# Patient Record
Sex: FEMALE | Race: WHITE | NOT HISPANIC OR LATINO | Employment: UNEMPLOYED | URBAN - METROPOLITAN AREA
[De-identification: names, ages, dates, MRNs, and addresses within clinical notes are randomized per-mention and may not be internally consistent; named-entity substitution may affect disease eponyms.]

---

## 2017-02-24 ENCOUNTER — ALLSCRIPTS OFFICE VISIT (OUTPATIENT)
Dept: OTHER | Facility: OTHER | Age: 37
End: 2017-02-24

## 2017-09-20 ENCOUNTER — GENERIC CONVERSION - ENCOUNTER (OUTPATIENT)
Dept: OTHER | Facility: OTHER | Age: 37
End: 2017-09-20

## 2018-01-10 NOTE — MISCELLANEOUS
Message   Recorded as Task   Date: 03/17/2016 04:27 PM, Created By: Neil Barthel   Task Name: Review Document   Assigned To: Kierra Tejeda   Regarding Patient: Angelia Samuel, Status: In Progress   Ramos Inman - 17 Mar 2016 4:27 PM     TASK CREATED  please call Marcelina- [facility] pelon Pared[facility] - 18 Mar 2016 8:11 AM     TASK IN PROGRESS   Keerthi Ogden - 18 Mar 2016 8:12 AM     TASK REPLIED TO: Previously Assigned To [address] with pt  Aware of nl labs  Active Problems    1  Blood type, Rh negative (V49 89) (Z67 91)   2  H/O migraine (V12 49) (Z86 69)   3  Has received influenza vaccination in current influenza season (V49 89) (Z78 9)   4  Multigravida of advanced maternal age in second trimester (65 56) (O09 522)   5  Need for prophylactic vaccination with combined diphtheria-tetanus-pertussis (DTaP)   vaccine (V06 1) (Z23)   6  Prenatal care in second trimester (V22 1) (Z34 92)   7  Short interval between pregnancies affecting pregnancy, antepartum (V23 89) (O09 899)    Current Meds   1  One-A-Day Womens Prenatal 28-0 8 & 440 MG Oral Miscellaneous; take as directed; Therapy: [id number] to Recorded    Allergies    1  No Known Drug Allergies    2  No Known Food Allergies   3   Seasonal    Signatures   Electronically signed by : Rama Cartwright, ; Mar 18 2016  8:12AM EST                       (Author)

## 2018-01-12 VITALS
BODY MASS INDEX: 29.51 KG/M2 | SYSTOLIC BLOOD PRESSURE: 114 MMHG | HEIGHT: 67 IN | DIASTOLIC BLOOD PRESSURE: 72 MMHG | WEIGHT: 188 LBS

## 2018-01-15 NOTE — MISCELLANEOUS
Message   Recorded as Task   Date: 06/02/2016 03:48 PM, Created By: Nessa Leslie   Task Name: Care Coordination   Assigned To: Katlin Patel   Regarding Patient: Kristel Rodriguez, Status: In Progress   Pepe Alves - 02 Jun 2016 3:48 PM     TASK CREATED  Pt called office stating she starting having severe abdominal pain 30-40 mins ago while resting at home with 3 children  Pt states it feels like a bad side stitch that takes her breath away at time, she is using labor breathing techniques  Pt states pain has remained unchanged, position changes do not help  Pt states it does not feel like a contraction, cannot tell if abdomen is tightening, but she does not think so  Pt describes pain of 6-8 out of 10  Pt states she has been feeling baby movements, and denies leaking of fluids  Per RK  (merly Gomez) pt should be seen in triage  Returned call to pt  States  works in [place] and it would be more than 2 hours until they could get to hospital  Pt states she has no family or friends who could drive her - they just moved  Explained to pt that we would advise her to get evaluated this evening  If pt feels she needs immediate evaluation and is concerned for herself or her baby pt should call 911 for emergency assistance  If pt feels well enough to wait for  she should rest and monitor sx until she can get to triage  Pt had labored breathing and was having some trouble speaking through the pain  Pt hung up to call , states she will call back with her plan for receiving care  Nessa Leslie - 10 Ted 2016 3:48 PM     TASK IN PROGRESS   Nessa Leslie - 02 Jun 2016 3:50 PM     TASK EDITED  Pt called office to say she has called 911  Pt was being loaded onto ambulence at time of call - [facility]  Nessa Leslie - 92 Jun 2016 2:21 PM     TASK EDITED  Spoke to pt today  States she [facility] for [facility]   Pt states she feels a lot better today  Pt had no further questions or concerns at this time  Record request sent to [facility]  Active Problems    1  Blood type, Rh negative (V49 89) (Z67 91)   2  Encounter for supervision of other normal pregnancy in second trimester (V22 1)   (Z34 82)   3  H/O migraine (V12 49) (Z86 69)   4  Has received influenza vaccination in current influenza season (V49 89) (Z78 9)   5  Multigravida of advanced maternal age in second trimester (65 56) (O09 522)   6  Need for diphtheria-tetanus-pertussis (Tdap) vaccine (V06 1) (Z23)   7  Need for prophylactic vaccination with combined diphtheria-tetanus-pertussis (DTaP)   vaccine (V06 1) (Z23)   8  Short interval between pregnancies affecting pregnancy, antepartum (V23 89) (O09 899)    Current Meds   1  One-A-Day Womens Prenatal 28-0 8 & 440 MG Oral Miscellaneous; take as directed; Therapy: [id number] to Recorded   2  ZyrTEC Allergy 10 MG Oral Capsule Recorded    Allergies    1  No Known Drug Allergies    2  No Known Food Allergies   3   Seasonal    Signatures   Electronically signed by : Grandville Aschoff, ; Jun 6 2016 11:45AM EST                       (Author)

## 2018-01-17 NOTE — PROGRESS NOTES
MAY 17 2016         RE: Albino Sullivan                              To: [facility]  MR#: [id number]                                  [address]   : [address]                                   [address]   ENC: [id number]:MELANY Huerta, [address]   (Exam #: [id number])                           Fax: [phone]      The LMP of this 39year old,  G4, P3-0-0-3 patient was unknown, her   working MELI is 2016 and the current gestational age is 29 weeks 5   days by [address] [place] [address]  A sonographic examination was performed on MAY   17 2016 using real time equipment  The ultrasound examination was   performed using abdominal technique  The patient has a BMI of 32 8  Her   blood pressure today was 136/77        Earliest ultrasound found in her record: 12/10/15 11w0d 16 MELI      Cardiac motion was observed at 153 bpm       INDICATIONS      fetal anatomical survey   late transfer   rh negative   advanced maternal age      Exam Types      LEVEL II      RESULTS      Fetus # 1 of 1   Vertex presentation   Fetal growth appeared normal   Placenta Location = Posterior   No placenta previa   Placenta Grade = I      MEASUREMENTS (* Included In Average GA)      AC              29 9 cm        34 weeks 0 days* (53%)   BPD              8 0 cm        31 weeks 6 days* (13%)   HC              29 5 cm        32 weeks 1 day * (9%)   Femur            6 2 cm        32 weeks 1 day * (27%)      Humerus          5 6 cm        32 weeks 3 days  (36%)   Radius           4 7 cm        34 weeks 3 days   Ulna             5 3 cm        33 weeks 2 days   Tibia            5 5 cm        32 weeks 1 day   (27%)   Fibula           5 5 cm        31 weeks 5 days   Foot             7 0 cm        34 weeks 1 day      Cerebellum       4 5 cm        35 weeks 2 days   Biorbit          4 6 cm        29 weeks 1 day      HC/AC           0 99   FL/AC           0 21   FL/BPD          0 78   EFW (Ac/Fl/Hc)  2129 grams - 4 lbs 11 oz                 (35%)      THE AVERAGE GESTATIONAL AGE is 32 weeks 4 days +/- 18 days  AMNIOTIC FLUID      Q1: 4 5      Q2: 2 3      Q3: 3 0      Q4: 4 4   LINDA Total = 14 3 cm   Amniotic Fluid: Normal      ANATOMY      Head                                    Normal   Face/Neck                               Normal   Th  Cav  Normal   Heart                                   Normal   Abd  Cav  Normal   Stomach                                 Normal   Right Kidney                            Normal   Left Kidney                             Normal   Bladder                                 Normal   Abd  Wall                               Normal   Spine                                   Normal   Extrems                                 Normal   Genitalia                               Normal   Placenta                                Normal   Umbl  Cord                              Normal   Uterus                                  Normal   PCI                                     Normal      ANATOMY DETAILS      Visualized Appearing Sonographically Normal:   HEAD: (Calvarium, BPD Level, Lateral Ventricles, Choroid Plexus,   Cerebellum, Cisterna Magna);    FACE/NECK: (Neck, Nuchal Fold, Profile,   Orbits, Nose/Lips, Palate, Face);    TH  CAV : (Diaphragm); HEART: (3   Vessel Trachea, Four Chamber View, Proximal Left Outflow, Proximal Right   Outflow, Aortic Arch, Ductal Arch, Short Axis of Greater Vessels, Cardiac   Axis, Interventricular Septum, Interatrial Septum, Cardiac Position);      ABD  CAV , STOMACH, RIGHT KIDNEY, LEFT KIDNEY, BLADDER, ABD  WALL, SPINE:   (Cervical Spine, Thoracic Spine, Lumbar Spine, Sacrum);    EXTREMS: (Lt   Humerus, Rt Humerus, Lt Forearm, Rt Forearm, Lt Hand, Rt Hand, Lt Femur,   Rt Femur, Lt Low Leg, Rt Low Leg, Lt Foot, Rt Foot);    GENITALIA,   PLACENTA, UMBL   CORD, UTERUS, PCI      ANATOMY COMMENTS      No fetal structural abnormality or ultrasound marker for aneuploidy is   identified on the Level II ultrasound study today  The genitalia were   reviewed and found to be female  The patient is aware of the results of   the fetal sex  Fetal growth and amniotic fluid volume appear normal     Active movement of the fetal body & extremities was seen  There is no   suspicion of a subchorionic bleed  The placental cord insertion was   normal  There is no evidence for spontaneous funneling of the cervix seen  ADNEXA      The left ovary appeared normal and measured 2 3 x 2 2 x 1 2 cm with a   volume of 3 2 cc  The right ovary appeared normal and measured 4 4 x 2 0 x   1 4 cm with a volume of 6 4 cc  IMPRESSION      Delgado IUP   32 weeks and 4 days by this ultrasound  (MELI=2016)   33 weeks and 5 days by 1st Tri Sono  (MELI=2016)   Vertex presentation   Fetal growth appeared normal   Normal anatomy survey   Regular fetal heart rate of 153 bpm   Posterior placenta   No placenta previa      GENERAL COMMENT         I had the pleasure of seeing Wellington Vanessa in the  center for   level II ultrasound on May 17  She is a 58-year-old  4 para 3003   with a working due date of   He has 3 healthy boys at home with no   complications with any of their deliveries  She denies any medical or   surgical problems  Her BMI is 32  Her blood pressure today was 136/77   mmHg  Her pregnancy has been uncomplicated to date  She denies any   pregnancy complications such as vaginal bleeding, leakage of fluid, mucoid   type vaginal discharge or contractions  RISK FACTORS: Advanced maternal age      Today's ultrasound was reassuring  A viable fetus was seen in the vertex   presentation  The placenta is posterior in location and there is no   evidence of a placenta previa   Amniotic fluid appeared normal  Fetal   growth appeared very appropriate and correlated well with her due date    There were no obvious anomalies seen today  The anatomic survey was   completed today  Both maternal ovaries were seen and appeared normal    There were no obvious subchorionic hematomas or uterine myomas  The   placental cord insertion were seen and was normal in location  The patient appeared well-nourished, well-developed, and in no apparent   distress  Her uterus is nontender  Her abdomen was gravid and nontender  The anatomic survey was completed today and there were no obvious anatomic   abnormalities  We discussed kick counts in the office today  We discussed the 10 kicks in   2 hour rule  I asked her to call your office if criteria are not met  She   should continue to do her kick counts on a daily basis  Kick counts should   begin at 28 weeks  MISSED ANATOMY: None      NEW FINDINGS ON TODAY'S ULTRASOUND: None, very reassuring ultrasound with   fetus in vertex presentation         IN SUMMARY:  Today's ultrasound is very reassuring  The fetus appears to   be growing well and today's ultrasound correlates very well with her due   date  There were no obvious anomalies seen today  The fetus was in the   vertex presentation  Given the normalcy of today's exam, no further   ultrasounds are scheduled for your patient in the future  Certainly we be   happy to see her in the future if it clinical situation arises but I will   leave that to your discretion  Thank you kindly for this referral                Total face-to-face time with the patient, excluding ultrasound time was 15   minutes with more than 50% of the time devoted to counseling and   coordination of care  Thank you very much for allowing me to participate in the care of your   patient  If you have any questions or concerns about today's visit, please   do not hesitate to call me  Sincerely,         GUILLE Borjas  Maternal Fetal Medicine      [address] [place] [address],[address], [address]        GUILLE Borjas  Maternal-Fetal Medicine   Electronically signed 05/17/16 10:17

## 2018-01-18 NOTE — MISCELLANEOUS
Message   Recorded as Task   Date: 06/02/2016 03:48 PM, Created By: Nessa Leslie   Task Name: Care Coordination   Assigned To: Katlin Patel   Regarding Patient: Kristel Rodriguez, Status: In Progress   Pepe Alves - 02 Jun 2016 3:48 PM     TASK CREATED  Pt called office stating she starting having severe abdominal pain 30-40 mins ago while resting at home with 3 children  Pt states it feels like a bad side stitch that takes her breath away at time, she is using labor breathing techniques  Pt states pain has remained unchanged, position changes do not help  Pt states it does not feel like a contraction, cannot tell if abdomen is tightening, but she does not think so  Pt describes pain of 6-8 out of 10  Pt states she has been feeling baby movements, and denies leaking of fluids  Per RK  (merly Gomez) pt should be seen in triage  Returned call to pt  States  works in [place] and it would be more than 2 hours until they could get to hospital  Pt states she has no family or friends who could drive her - they just moved  Explained to pt that we would advise her to get evaluated this evening  If pt feels she needs immediate evaluation and is concerned for herself or her baby pt should call 911 for emergency assistance  If pt feels well enough to wait for  she should rest and monitor sx until she can get to triage  Pt had labored breathing and was having some trouble speaking through the pain  Pt hung up to call , states she will call back with her plan for receiving care  Nessa Leslie - 65 Jun 2016 3:48 PM     TASK IN PROGRESS   Nessa Leslie - 02 Jun 2016 3:50 PM     TASK EDITED  Pt called office to say she has called 911  Pt was being loaded onto ambulence at time of call - [facility]  Nessa Leslie - 29 Jun 2016 2:21 PM     TASK EDITED  Spoke to pt today  States she [facility]   Pt states she feels a lot better today  Pt had no further questions or concerns at this time  Record request sent to [facility]  Active Problems    1  Blood type, Rh negative (V49 89) (Z67 91)   2  Encounter for supervision of other normal pregnancy in second trimester (V22 1)   (Z34 82)   3  H/O migraine (V12 49) (Z86 69)   4  Has received influenza vaccination in current influenza season (V49 89) (Z78 9)   5  Multigravida of advanced maternal age in second trimester (65 56) (O09 522)   6  Need for diphtheria-tetanus-pertussis (Tdap) vaccine (V06 1) (Z23)   7  Need for prophylactic vaccination with combined diphtheria-tetanus-pertussis (DTaP)   vaccine (V06 1) (Z23)   8  Short interval between pregnancies affecting pregnancy, antepartum (V23 89) (O09 899)    Current Meds   1  One-A-Day Womens Prenatal 28-0 8 & 440 MG Oral Miscellaneous; take as directed; Therapy: [id number] to Recorded   2  ZyrTEC Allergy 10 MG Oral Capsule Recorded    Allergies    1  No Known Drug Allergies    2  No Known Food Allergies   3   Seasonal    Signatures   Electronically signed by : Howard Medrano, ; Ted  3 2016  2:22PM EST                       (Author)

## 2018-01-22 VITALS
OXYGEN SATURATION: 93 % | SYSTOLIC BLOOD PRESSURE: 116 MMHG | TEMPERATURE: 98.8 F | BODY MASS INDEX: 28.09 KG/M2 | DIASTOLIC BLOOD PRESSURE: 74 MMHG | HEIGHT: 67 IN | WEIGHT: 179 LBS | HEART RATE: 89 BPM

## 2018-03-14 ENCOUNTER — OFFICE VISIT (OUTPATIENT)
Dept: FAMILY MEDICINE CLINIC | Facility: CLINIC | Age: 38
End: 2018-03-14
Payer: COMMERCIAL

## 2018-03-14 VITALS
WEIGHT: 173 LBS | DIASTOLIC BLOOD PRESSURE: 84 MMHG | SYSTOLIC BLOOD PRESSURE: 132 MMHG | HEART RATE: 57 BPM | RESPIRATION RATE: 18 BRPM | OXYGEN SATURATION: 98 % | HEIGHT: 67 IN | TEMPERATURE: 97.6 F | BODY MASS INDEX: 27.15 KG/M2

## 2018-03-14 DIAGNOSIS — R05.8 POST-VIRAL COUGH SYNDROME: Primary | ICD-10-CM

## 2018-03-14 PROCEDURE — 99213 OFFICE O/P EST LOW 20 MIN: CPT | Performed by: NURSE PRACTITIONER

## 2018-03-14 PROCEDURE — 3008F BODY MASS INDEX DOCD: CPT | Performed by: NURSE PRACTITIONER

## 2018-03-14 RX ORDER — DEXTROMETHORPHAN HYDROBROMIDE AND PROMETHAZINE HYDROCHLORIDE 15; 6.25 MG/5ML; MG/5ML
5 SYRUP ORAL 4 TIMES DAILY PRN
Qty: 100 ML | Refills: 0 | Status: SHIPPED | OUTPATIENT
Start: 2018-03-14 | End: 2018-06-04 | Stop reason: ALTCHOICE

## 2018-03-14 RX ORDER — METHYLPREDNISOLONE 4 MG/1
TABLET ORAL
Qty: 21 TABLET | Refills: 0 | Status: SHIPPED | OUTPATIENT
Start: 2018-03-14 | End: 2018-06-04 | Stop reason: ALTCHOICE

## 2018-03-14 NOTE — ASSESSMENT & PLAN NOTE
To begin Medrol Dosepak and promethazine DM as needed for cough  Counseled on staying well hydrated and avoiding respiratory irritants  To call office if fever develops, symptoms worsen, or cough becomes productive  Follow-up as needed

## 2018-06-04 ENCOUNTER — ANNUAL EXAM (OUTPATIENT)
Dept: OBGYN CLINIC | Age: 38
End: 2018-06-04
Payer: COMMERCIAL

## 2018-06-04 VITALS
SYSTOLIC BLOOD PRESSURE: 136 MMHG | BODY MASS INDEX: 27.31 KG/M2 | DIASTOLIC BLOOD PRESSURE: 88 MMHG | WEIGHT: 174 LBS | HEIGHT: 67 IN

## 2018-06-04 DIAGNOSIS — Z01.419 ENCOUNTER FOR GYNECOLOGICAL EXAMINATION WITHOUT ABNORMAL FINDING: Primary | ICD-10-CM

## 2018-06-04 PROCEDURE — 99395 PREV VISIT EST AGE 18-39: CPT | Performed by: OBSTETRICS & GYNECOLOGY

## 2018-06-04 NOTE — ASSESSMENT & PLAN NOTE
Pap/HPV current, due 2021  Encourage healthy diet, exercise, Calcium 1200mg per day and at least 800 iu Vitamin D daily

## 2018-06-04 NOTE — PATIENT INSTRUCTIONS
Wellness Visit for Adults   WHAT YOU NEED TO KNOW:   What is a wellness visit? A wellness visit is when you see your healthcare provider to get screened for health problems  You can also get advice on how to stay healthy  Write down your questions so you remember to ask them  Ask your healthcare provider how often you should have a wellness visit  What happens at a wellness visit? Your healthcare provider will ask about your health, and your family history of health problems  This includes high blood pressure, heart disease, and cancer  He or she will ask if you have symptoms that concern you, if you smoke, and about your mood  You may also be asked about your intake of medicines, supplements, food, and alcohol  Any of the following may be done:  · Your weight  will be checked  Your height may also be checked so your body mass index (BMI) can be calculated  Your BMI shows if you are at a healthy weight  · Your blood pressure  and heart rate will be checked  Your temperature may also be checked  · Blood and urine tests  may be done  Blood tests may be done to check your cholesterol levels  Abnormal cholesterol levels increase your risk for heart disease and stroke  You may also need a blood or urine test to check for diabetes if you are at increased risk  Urine tests may be done to look for signs of an infection or kidney disease  · A physical exam  includes checking your heartbeat and lungs with a stethoscope  Your healthcare provider may also check your skin to look for sun damage  · Screening tests  may be recommended  A screening test is done to check for diseases that may not cause symptoms  The screening tests you may need depend on your age, gender, family history, and lifestyle habits  For example, colorectal screening may be recommended if you are 48years old or older  What screening tests do I need if I am a woman? · A Pap smear  is used to screen for cervical cancer   Pap smears are usually done every 3 to 5 years depending on your age  You may need them more often if you have had abnormal Pap smear test results in the past  Ask your healthcare provider how often you should have a Pap smear  · A mammogram  is an x-ray of your breasts to screen for breast cancer  Experts recommend mammograms every 2 years starting at age 48 years  You may need a mammogram at age 52 years or younger if you have an increased risk for breast cancer  Talk to your healthcare provider about when you should start having mammograms and how often you need them  What vaccines might I need? · Get an influenza vaccine  every year  The influenza vaccine protects you from the flu  Several types of viruses cause the flu  The viruses change over time, so new vaccines are made each year  · Get a tetanus-diphtheria (Td) booster vaccine  every 10 years  This vaccine protects you against tetanus and diphtheria  Tetanus is a severe infection that may cause painful muscle spasms and lockjaw  Diphtheria is a severe bacterial infection that causes a thick covering in the back of your mouth and throat  · Get a human papillomavirus (HPV) vaccine  if you are female and aged 23 to 32 or male 23 to 24 and never received it  This vaccine protects you from HPV infection  HPV is the most common infection spread by sexual contact  HPV may also cause vaginal, penile, and anal cancers  · Get a pneumococcal vaccine  if you are aged 72 years or older  The pneumococcal vaccine is an injection given to protect you from pneumococcal disease  Pneumococcal disease is an infection caused by pneumococcal bacteria  The infection may cause pneumonia, meningitis, or an ear infection  · Get a shingles vaccine  if you are aged 61 or older, even if you have had shingles before  The shingles vaccine is an injection to protect you from the varicella-zoster virus  This is the same virus that causes chickenpox   Shingles is a painful rash that develops in people who had chickenpox or have been exposed to the virus  How can I eat healthy? My Plate is a model for planning healthy meals  It shows the types and amounts of foods that should go on your plate  Fruits and vegetables make up about half of your plate, and grains and protein make up the other half  A serving of dairy is included on the side of your plate  The amount of calories and serving sizes you need depends on your age, gender, weight, and height  Examples of healthy foods are listed below:  · Eat a variety of vegetables  such as dark green, red, and orange vegetables  You can also include canned vegetables low in sodium (salt) and frozen vegetables without added butter or sauces  · Eat a variety of fresh fruits , canned fruit in 100% juice, frozen fruit, and dried fruit  · Include whole grains  At least half of the grains you eat should be whole grains  Examples include whole-wheat bread, wheat pasta, brown rice, and whole-grain cereals such as oatmeal     · Eat a variety of protein foods such as seafood (fish and shellfish), lean meat, and poultry without skin (turkey and chicken)  Examples of lean meats include pork leg, shoulder, or tenderloin, and beef round, sirloin, tenderloin, and extra lean ground beef  Other protein foods include eggs and egg substitutes, beans, peas, soy products, nuts, and seeds  · Choose low-fat dairy products such as skim or 1% milk or low-fat yogurt, cheese, and cottage cheese  · Limit unhealthy fats  such as butter, hard margarine, and shortening  How much exercise do I need? Exercise at least 30 minutes per day on most days of the week  Some examples of exercise include walking, biking, dancing, and swimming  You can also fit in more physical activity by taking the stairs instead of the elevator or parking farther away from stores  Include muscle strengthening activities 2 days each week  Regular exercise provides many health benefits  It helps you manage your weight, and decreases your risk for type 2 diabetes, heart disease, stroke, and high blood pressure  Exercise can also help improve your mood  Ask your healthcare provider about the best exercise plan for you  What are some general health and safety guidelines I should follow? · Do not smoke  Nicotine and other chemicals in cigarettes and cigars can cause lung damage  Ask your healthcare provider for information if you currently smoke and need help to quit  E-cigarettes or smokeless tobacco still contain nicotine  Talk to your healthcare provider before you use these products  · Limit alcohol  A drink of alcohol is 12 ounces of beer, 5 ounces of wine, or 1½ ounces of liquor  · Lose weight, if needed  Being overweight increases your risk of certain health conditions  These include heart disease, high blood pressure, type 2 diabetes, and certain types of cancer  · Protect your skin  Do not sunbathe or use tanning beds  Use sunscreen with a SPF 15 or higher  Apply sunscreen at least 15 minutes before you go outside  Reapply sunscreen every 2 hours  Wear protective clothing, hats, and sunglasses when you are outside  · Drive safely  Always wear your seatbelt  Make sure everyone in your car wears a seatbelt  A seatbelt can save your life if you are in an accident  Do not use your cell phone when you are driving  This could distract you and cause an accident  Pull over if you need to make a call or send a text message  · Practice safe sex  Use latex condoms if are sexually active and have more than one partner  Your healthcare provider may recommend screening tests for sexually transmitted infections (STIs)  · Wear helmets, lifejackets, and protective gear  Always wear a helmet when you ride a bike or motorcycle, go skiing, or play sports that could cause a head injury  Wear protective equipment when you play sports   Wear a lifejacket when you are on a boat or doing water sports  CARE AGREEMENT:   You have the right to help plan your care  Learn about your health condition and how it may be treated  Discuss treatment options with your caregivers to decide what care you want to receive  You always have the right to refuse treatment  The above information is an  only  It is not intended as medical advice for individual conditions or treatments  Talk to your doctor, nurse or pharmacist before following any medical regimen to see if it is safe and effective for you  © 2017 [employer] Information is for End User's use only and may not be sold, redistributed or otherwise used for commercial purposes  All illustrations and images included in CareNotes® are the copyrighted property of A [employer]  or Daniel Sweeney

## 2018-06-04 NOTE — PROGRESS NOTES
Assessment/Plan:    No problem-specific Assessment & Plan notes found for this encounter  Diagnoses and all orders for this visit:    Encounter for gynecological examination without abnormal finding          Subjective:      Patient ID: Louis Bruno is a 45 y o  female  Patient here for yearly exam   Age of first period 15yrs old    Lmp: 18  Birth control:  had vasectomy  Last pap: 3/30/16 neg HPV neg (due )  Patient is not a smoker  Patient drinks rarely  Patient rarely exercises  No complaints  Going to [facility] for a week this year  Oldest is 6  Just finished       Gynecologic Exam   The patient's pertinent negatives include no genital itching, genital lesions, genital odor, genital rash, pelvic pain, vaginal bleeding or vaginal discharge  The patient is experiencing no pain  Pertinent negatives include no chills, constipation, diarrhea, fever, nausea, sore throat or vomiting  She is sexually active  Her menstrual history has been regular  The following portions of the patient's history were reviewed and updated as appropriate:   She  has a past medical history of Migraine and Varicella  She   Patient Active Problem List    Diagnosis Date Noted    Encounter for gynecological examination without abnormal finding 2018    Post-viral cough syndrome 2018     She  has a past surgical history that includes Anterior cruciate ligament repair and Vaginal delivery  Her family history includes Arthritis in her maternal grandmother; Asthma in her maternal grandmother; Cancer in her father and paternal grandmother; Depression in her paternal grandmother; Diabetes in her maternal grandmother; Early death in her maternal aunt;  Heart block in her father and paternal grandfather; Heart disease in her maternal aunt, maternal grandfather, maternal grandmother, and paternal grandfather; Hypertension in her father, maternal grandfather, maternal grandmother, other, paternal grandfather, and paternal grandmother  She  reports that she has never smoked  She has never used smokeless tobacco  She reports that she does not drink alcohol or use drugs  Current Outpatient Prescriptions   Medication Sig Dispense Refill    acetaminophen (TYLENOL) 325 mg tablet Take 2 tablets (650 mg total) by mouth every 4 (four) hours as needed for headaches  30 tablet 0     No current facility-administered medications for this visit  Current Outpatient Prescriptions on File Prior to Visit   Medication Sig    acetaminophen (TYLENOL) 325 mg tablet Take 2 tablets (650 mg total) by mouth every 4 (four) hours as needed for headaches   [DISCONTINUED] Methylprednisolone 4 MG TBPK Use as directed on package    [DISCONTINUED] promethazine-dextromethorphan (PHENERGAN-DM) 6 25-15 mg/5 mL oral syrup Take 5 mL by mouth 4 (four) times a day as needed for cough     No current facility-administered medications on file prior to visit  She is allergic to pollen extract       Review of Systems   Constitutional: Negative for activity change, appetite change, chills, fatigue and fever  HENT: Negative for rhinorrhea, sneezing and sore throat  Eyes: Negative for visual disturbance  Respiratory: Negative for cough, shortness of breath and wheezing  Cardiovascular: Negative for chest pain, palpitations and leg swelling  Gastrointestinal: Negative for abdominal distention, constipation, diarrhea, nausea and vomiting  Genitourinary: Negative for difficulty urinating, pelvic pain and vaginal discharge  Neurological: Negative for syncope and light-headedness  Objective:      /88 (BP Location: Left arm, Patient Position: Sitting, Cuff Size: Standard)   Ht 5' 6 5" (1 689 m)   Wt 78 9 kg (174 lb)   LMP 05/22/2018 (Exact Date)   BMI 27 66 kg/m²          Physical Exam   Constitutional: She is oriented to person, place, and time     Genitourinary: Vagina normal and uterus normal  No breast swelling, tenderness, discharge or bleeding  There is no rash, tenderness, lesion or injury on the right labia  There is no rash, tenderness, lesion or injury on the left labia  Uterus is not deviated, not enlarged, not fixed and not tender  Cervix exhibits no motion tenderness, no discharge and no friability  Right adnexum displays no mass, no tenderness and no fullness  Left adnexum displays no mass, no tenderness and no fullness  No tenderness or bleeding in the vagina  No vaginal discharge found  Neurological: She is alert and oriented to person, place, and time

## 2019-01-28 ENCOUNTER — OFFICE VISIT (OUTPATIENT)
Dept: FAMILY MEDICINE CLINIC | Facility: CLINIC | Age: 39
End: 2019-01-28
Payer: COMMERCIAL

## 2019-01-28 VITALS
RESPIRATION RATE: 16 BRPM | SYSTOLIC BLOOD PRESSURE: 130 MMHG | DIASTOLIC BLOOD PRESSURE: 80 MMHG | TEMPERATURE: 98 F | OXYGEN SATURATION: 98 % | BODY MASS INDEX: 28.41 KG/M2 | WEIGHT: 181 LBS | HEART RATE: 68 BPM | HEIGHT: 67 IN

## 2019-01-28 DIAGNOSIS — M77.8 TRICEPS TENDINITIS: Primary | ICD-10-CM

## 2019-01-28 PROCEDURE — 3008F BODY MASS INDEX DOCD: CPT | Performed by: FAMILY MEDICINE

## 2019-01-28 PROCEDURE — 99213 OFFICE O/P EST LOW 20 MIN: CPT | Performed by: FAMILY MEDICINE

## 2019-01-28 RX ORDER — PREDNISONE 10 MG/1
TABLET ORAL
Qty: 30 TABLET | Refills: 0 | Status: SHIPPED | OUTPATIENT
Start: 2019-01-28 | End: 2019-06-10 | Stop reason: ALTCHOICE

## 2019-01-28 NOTE — PROGRESS NOTES
Assessment/Plan:           Problem List Items Addressed This Visit     Triceps tendinitis - Primary     Intermittent ice         Relevant Medications    predniSONE 10 mg tablet            Subjective:      Patient ID: Mercedes Espinosa is a 44 y o  female  Patient comes in with right arm pain  She fell downstairs 2 months ago injuring her right arm  Most area of soreness have resolved  She still has tenderness right posterior arm  The following portions of the patient's history were reviewed and updated as appropriate: allergies, current medications, past family history, past medical history, past social history, past surgical history and problem list     Review of Systems   Constitutional: Negative  HENT: Negative  Respiratory: Negative  Cardiovascular: Negative  Objective:      /80   Pulse 68   Temp 98 °F (36 7 °C)   Resp 16   Ht 5' 7" (1 702 m)   Wt 82 1 kg (181 lb)   SpO2 98%   BMI 28 35 kg/m²          Physical Exam   Musculoskeletal:   Full range of motion right elbow  Tenderness over the triceps tendon

## 2019-06-04 ENCOUNTER — HOSPITAL ENCOUNTER (EMERGENCY)
Facility: HOSPITAL | Age: 39
Discharge: HOME | End: 2019-06-04
Attending: EMERGENCY MEDICINE | Admitting: EMERGENCY MEDICINE
Payer: COMMERCIAL

## 2019-06-04 ENCOUNTER — APPOINTMENT (EMERGENCY)
Dept: RADIOLOGY | Facility: HOSPITAL | Age: 39
End: 2019-06-04
Payer: COMMERCIAL

## 2019-06-04 VITALS
SYSTOLIC BLOOD PRESSURE: 156 MMHG | OXYGEN SATURATION: 98 % | DIASTOLIC BLOOD PRESSURE: 76 MMHG | BODY MASS INDEX: 29.03 KG/M2 | HEIGHT: 67 IN | HEART RATE: 69 BPM | WEIGHT: 185 LBS | RESPIRATION RATE: 18 BRPM | TEMPERATURE: 98.6 F

## 2019-06-04 DIAGNOSIS — S93.601A SPRAIN OF RIGHT FOOT, INITIAL ENCOUNTER: Primary | ICD-10-CM

## 2019-06-04 PROCEDURE — 99283 EMERGENCY DEPT VISIT LOW MDM: CPT

## 2019-06-04 PROCEDURE — 99283 EMERGENCY DEPT VISIT LOW MDM: CPT | Performed by: NURSE PRACTITIONER

## 2019-06-04 PROCEDURE — 73630 X-RAY EXAM OF FOOT: CPT

## 2019-06-04 RX ORDER — IBUPROFEN 200 MG
400 TABLET ORAL EVERY 6 HOURS PRN
COMMUNITY
End: 2020-06-03

## 2019-06-04 RX ORDER — NAPROXEN 500 MG/1
500 TABLET ORAL 2 TIMES DAILY WITH MEALS
Qty: 10 TABLET | Refills: 0 | Status: SHIPPED | OUTPATIENT
Start: 2019-06-04 | End: 2020-06-03

## 2019-06-04 RX ORDER — HYDROCODONE BITARTRATE AND ACETAMINOPHEN 5; 325 MG/1; MG/1
1 TABLET ORAL EVERY 6 HOURS PRN
Qty: 12 TABLET | Refills: 0 | Status: SHIPPED | OUTPATIENT
Start: 2019-06-04 | End: 2019-06-10 | Stop reason: ALTCHOICE

## 2019-06-10 ENCOUNTER — ANNUAL EXAM (OUTPATIENT)
Dept: OBGYN CLINIC | Facility: CLINIC | Age: 39
End: 2019-06-10
Payer: COMMERCIAL

## 2019-06-10 VITALS — HEIGHT: 67 IN | WEIGHT: 191.25 LBS | BODY MASS INDEX: 30.02 KG/M2

## 2019-06-10 DIAGNOSIS — Z01.419 ENCOUNTER FOR GYNECOLOGICAL EXAMINATION WITHOUT ABNORMAL FINDING: Primary | ICD-10-CM

## 2019-06-10 PROCEDURE — 99395 PREV VISIT EST AGE 18-39: CPT | Performed by: OBSTETRICS & GYNECOLOGY

## 2019-08-25 NOTE — PROGRESS NOTES
Assessment/Plan:    Post-viral cough syndrome    To begin Medrol Dosepak and promethazine DM as needed for cough  Counseled on staying well hydrated and avoiding respiratory irritants  To call office if fever develops, symptoms worsen, or cough becomes productive  Follow-up as needed  Diagnoses and all orders for this visit:    Post-viral cough syndrome  -     Methylprednisolone 4 MG TBPK; Use as directed on package  -     promethazine-dextromethorphan (PHENERGAN-DM) 6 25-15 mg/5 mL oral syrup; Take 5 mL by mouth 4 (four) times a day as needed for cough    Other orders  -     Discontinue: Cetirizine HCl (ZYRTEC ALLERGY) 10 MG CAPS; Take by mouth          Subjective:      Patient ID: Christin Choi is a 45 y o  female  Cough   This is a new problem  The current episode started 1 to 4 weeks ago ( started 2 weeks ago after having a cold  )  The problem has been unchanged  The problem occurs hourly  The cough is non-productive  Associated symptoms include postnasal drip and a sore throat  Pertinent negatives include no chest pain, chills, ear congestion, fever, myalgias, nasal congestion, rhinorrhea, shortness of breath, weight loss or wheezing  The symptoms are aggravated by lying down  Treatments tried:   NyQuil  The treatment provided moderate relief  Her past medical history is significant for bronchitis  There is no history of asthma or pneumonia  The following portions of the patient's history were reviewed and updated as appropriate: She  has a past medical history of Migraine and Varicella  She   Patient Active Problem List    Diagnosis Date Noted    Post-viral cough syndrome 03/14/2018     She  has a past surgical history that includes Anterior cruciate ligament repair and Vaginal delivery    Her family history includes Arthritis in her maternal grandmother; Asthma in her maternal grandmother; Cancer in her father and paternal grandmother; Depression in her paternal grandmother; Diabetes pt co heavy/clotty bleeding from vagina a/w abd pain starting today, sts took day after pill Tuesday,  thought she was pregnant. denies fever, n/v/d, in her maternal grandmother; Early death in her maternal aunt; Heart block in her father and paternal grandfather; Heart disease in her maternal aunt, maternal grandfather, maternal grandmother, and paternal grandfather; Hypertension in her father, maternal grandfather, maternal grandmother, other, paternal grandfather, and paternal grandmother  She  reports that she has never smoked  She has never used smokeless tobacco  She reports that she does not drink alcohol or use drugs  Current Outpatient Prescriptions   Medication Sig Dispense Refill    acetaminophen (TYLENOL) 325 mg tablet Take 2 tablets (650 mg total) by mouth every 4 (four) hours as needed for headaches  30 tablet 0    Methylprednisolone 4 MG TBPK Use as directed on package 21 tablet 0    promethazine-dextromethorphan (PHENERGAN-DM) 6 25-15 mg/5 mL oral syrup Take 5 mL by mouth 4 (four) times a day as needed for cough 100 mL 0     No current facility-administered medications for this visit  Current Outpatient Prescriptions on File Prior to Visit   Medication Sig    acetaminophen (TYLENOL) 325 mg tablet Take 2 tablets (650 mg total) by mouth every 4 (four) hours as needed for headaches   [DISCONTINUED] cetirizine (ZyrTEC) 10 mg tablet Take 10 mg by mouth daily   [DISCONTINUED] Prenatal MV-Min-Fe Fum-FA-DHA (PRENATAL 1 PO) Take by mouth daily   [DISCONTINUED] ranitidine (ZANTAC) 75 MG tablet Take 75 mg by mouth daily at bedtime  No current facility-administered medications on file prior to visit  She is allergic to pollen extract       Review of Systems   Constitutional: Negative for chills, fever and weight loss  HENT: Positive for postnasal drip and sore throat  Negative for rhinorrhea  Respiratory: Positive for cough  Negative for chest tightness, shortness of breath and wheezing  Cardiovascular: Negative for chest pain  Musculoskeletal: Negative for myalgias  Skin: Negative  Neurological: Negative  Psychiatric/Behavioral: Negative  /84   Pulse 57   Temp 97 6 °F (36 4 °C)   Resp 18   Ht 5' 7" (1 702 m)   Wt 78 5 kg (173 lb)   LMP 03/07/2018 (Within Days)   SpO2 98%   BMI 27 10 kg/m²     Objective:     Physical Exam   Constitutional: She is oriented to person, place, and time  She appears well-developed and well-nourished  HENT:   Head: Normocephalic and atraumatic  Right Ear: External ear normal    Left Ear: External ear normal    Nose: Nose normal    Mouth/Throat: Oropharynx is clear and moist  No oropharyngeal exudate  Tms Dull B/L   Neck: Neck supple  Cardiovascular: Regular rhythm and normal heart sounds  No murmur heard  Pulmonary/Chest: Effort normal and breath sounds normal  No respiratory distress  She has no wheezes  She has no rales  She exhibits no tenderness  Dry cough on exam    Lymphadenopathy:     She has no cervical adenopathy  Neurological: She is alert and oriented to person, place, and time  Psychiatric: She has a normal mood and affect   Her behavior is normal  Judgment and thought content normal

## 2020-01-30 ENCOUNTER — OFFICE VISIT (OUTPATIENT)
Dept: FAMILY MEDICINE CLINIC | Facility: CLINIC | Age: 40
End: 2020-01-30
Payer: COMMERCIAL

## 2020-01-30 VITALS
SYSTOLIC BLOOD PRESSURE: 128 MMHG | WEIGHT: 189 LBS | BODY MASS INDEX: 29.6 KG/M2 | OXYGEN SATURATION: 98 % | DIASTOLIC BLOOD PRESSURE: 90 MMHG | TEMPERATURE: 98.1 F | HEART RATE: 68 BPM

## 2020-01-30 DIAGNOSIS — G56.01 CARPAL TUNNEL SYNDROME OF RIGHT WRIST: Primary | ICD-10-CM

## 2020-01-30 PROCEDURE — 99213 OFFICE O/P EST LOW 20 MIN: CPT | Performed by: PHYSICIAN ASSISTANT

## 2020-01-30 NOTE — PROGRESS NOTES
Assessment/Plan:  BMI Counseling: Body mass index is 29 6 kg/m²  The BMI is above normal  Nutrition recommendations include decreasing portion sizes, encouraging healthy choices of fruits and vegetables, consuming healthier snacks, limiting drinks that contain sugar, increasing intake of lean protein and reducing intake of saturated and trans fat  Exercise recommendations include exercising 3-5 times per week  No pharmacotherapy was ordered  Diagnoses and all orders for this visit:    Carpal tunnel syndrome of right wrist  -     EMG 1 Limb; Future        Patient to use wrist cock-up splint at night  Subjective:      Patient ID: Jo Theodore is a 36 y o  female  Patient has a 2 week history of pins and needles feeling in her right 2nd digit and 3rd digit  Prior to that she would notice she might wake up in the morning with some tingling and it would resolve  Now she states it stays and at times worsens through the day  She went to urgent care recently and they put her on prednisone  She has not improved on prednisone at this time  She denies any weakness of her hand or her arm  The following portions of the patient's history were reviewed and updated as appropriate:   She has a past medical history of Migraine and Varicella  ,  does not have any pertinent problems on file  ,   has a past surgical history that includes Anterior cruciate ligament repair and Vaginal delivery  ,  family history includes Arthritis in her maternal grandmother; Asthma in her maternal grandmother; Cancer in her father and paternal grandmother; Depression in her paternal grandmother; Diabetes in her maternal grandmother; Early death in her maternal aunt;  Heart block in her father and paternal grandfather; Heart disease in her maternal aunt, maternal grandfather, maternal grandmother, and paternal grandfather; Hypertension in her father, maternal grandfather, maternal grandmother, other, paternal grandfather, and paternal grandmother  ,   reports that she has never smoked  She has never used smokeless tobacco  She reports that she drinks alcohol  She reports that she does not use drugs  ,  is allergic to pollen extract     Current Outpatient Medications   Medication Sig Dispense Refill    acetaminophen (TYLENOL) 325 mg tablet Take 2 tablets (650 mg total) by mouth every 4 (four) hours as needed for headaches  30 tablet 0    Cetirizine HCl (ZYRTEC ALLERGY) 10 MG CAPS Take by mouth      ibuprofen (MOTRIN) 200 mg tablet Take 400 mg by mouth every 6 (six) hours as needed for mild pain      naproxen (NAPROSYN) 500 mg tablet Take 1 tablet (500 mg total) by mouth 2 (two) times a day with meals for 5 days 10 tablet 0     No current facility-administered medications for this visit  Review of Systems   Constitutional: Negative for activity change, chills and fever  Respiratory: Negative for chest tightness  Endocrine: Negative for cold intolerance and heat intolerance  Musculoskeletal: Negative for arthralgias and joint swelling  Skin: Negative for rash  Neurological: Positive for numbness  Negative for dizziness and light-headedness  Objective:  Vitals:    01/30/20 1304   BP: 128/90   Pulse: 68   Temp: 98 1 °F (36 7 °C)   SpO2: 98%   Weight: 85 7 kg (189 lb)     Body mass index is 29 6 kg/m²  Physical Exam   Constitutional: She is oriented to person, place, and time  She appears well-developed and well-nourished  HENT:   Head: Normocephalic  Cardiovascular: Normal rate and regular rhythm  Pulmonary/Chest: Effort normal    Musculoskeletal: Normal range of motion  She exhibits no edema or tenderness  Neurological: She is alert and oriented to person, place, and time  She has normal strength  No sensory deficit  Patient can feel touch on the tip of her fingers that are affected  Skin: Skin is warm and dry  Psychiatric: She has a normal mood and affect   Her behavior is normal    Nursing note and vitals reviewed

## 2020-02-10 ENCOUNTER — TELEPHONE (OUTPATIENT)
Dept: FAMILY MEDICINE CLINIC | Facility: CLINIC | Age: 40
End: 2020-02-10

## 2020-02-10 DIAGNOSIS — G56.01 CARPAL TUNNEL SYNDROME OF RIGHT WRIST: Primary | ICD-10-CM

## 2020-02-10 RX ORDER — PREDNISONE 10 MG/1
TABLET ORAL
Qty: 15 TABLET | Refills: 0 | Status: SHIPPED | OUTPATIENT
Start: 2020-02-10 | End: 2020-06-03

## 2020-02-10 NOTE — TELEPHONE ENCOUNTER
See if patient is taking any type of anti inflammatory either ibuprofen or Naprosyn  If not she needs to take those on a regular basis  I can also send in prednisone if she would like to take that

## 2020-02-10 NOTE — TELEPHONE ENCOUNTER
Do not take the ibuprofen and prednisone at the same time  Make sure patient has something to eat with both  Prednisone should be taken in the beginning of the day not late

## 2020-02-10 NOTE — TELEPHONE ENCOUNTER
Patient states her pain in the wrist is worse  Patient states its swollen and still achy  Please advise

## 2020-02-26 ENCOUNTER — TELEPHONE (OUTPATIENT)
Dept: FAMILY MEDICINE CLINIC | Facility: CLINIC | Age: 40
End: 2020-02-26

## 2020-02-26 ENCOUNTER — PROCEDURE VISIT (OUTPATIENT)
Dept: NEUROLOGY | Facility: CLINIC | Age: 40
End: 2020-02-26
Payer: COMMERCIAL

## 2020-02-26 DIAGNOSIS — G56.01 CARPAL TUNNEL SYNDROME OF RIGHT WRIST: ICD-10-CM

## 2020-02-26 DIAGNOSIS — G56.01 CARPAL TUNNEL SYNDROME OF RIGHT WRIST: Primary | ICD-10-CM

## 2020-02-26 DIAGNOSIS — M79.601 RIGHT ARM PAIN: ICD-10-CM

## 2020-02-26 PROCEDURE — 95886 MUSC TEST DONE W/N TEST COMP: CPT | Performed by: PHYSICAL MEDICINE & REHABILITATION

## 2020-02-26 PROCEDURE — 95909 NRV CNDJ TST 5-6 STUDIES: CPT | Performed by: PHYSICAL MEDICINE & REHABILITATION

## 2020-02-26 NOTE — TELEPHONE ENCOUNTER
----- Message from Alfonza Lesch, PA-C sent at 2/26/2020 10:37 AM EST -----  EMG study was normal   There is no problems as far as any of the nerves in her right arm are concerned

## 2020-02-26 NOTE — TELEPHONE ENCOUNTER
Patient is aware of results, she says she is still having problems with her arm and would like to know what's the next step?

## 2020-02-26 NOTE — PROGRESS NOTES
EMG 1 Limb     Date/Time 2/26/2020 10:16 AM     Performed by  Geoffrey Gardner MD     Authorized by Paxton Haider PA-C      Universal Protocol Consent: Verbal consent obtained  Risks and benefits: risks, benefits and alternatives were discussed  Consent given by: patient  Patient understanding: patient states understanding of the procedure being performed  Patient consent: the patient's understanding of the procedure matches consent given  Patient identity confirmed: verbally with patient               EMG RIGHT UPPER EXTREMITY    20-year-old female presents with tingling and numbness in the right 2nd and 3rd digit for the last few weeks, that gets worse throughout the day  She denies any weakness of her hand or arm  Patient is being evaluated for focal neuropathy  On physical examination, motor strength is 5/5 throughout  Sensations are intact to light touch and pinprick in the median, radial and ulnar nerve distribution  Motor and sensory conduction studies were performed on the right median and ulnar nerves  The distal motor latencies were normal  The motor action potential amplitudes were normal  Motor conduction velocities were normal including conduction velocity of the ulnar nerve across the elbow  Median and ulnar F waves were normal     Median and ulnar distal sensory latencies were normal including conduction of the median nerve across the palm with normal sensory action potential amplitudes  Concentric needle EMG was performed on various distal and proximal muscles of the right upper extremity including APB, FDI, pronator teres, deltoid, biceps, triceps and low cervical paraspinal region  There was no evidence of active denervation in any of the muscles tested  The compound motor unit action potentials were of normal configuration with interference patterns being full or full for effort  IMPRESSION: Normal study  GUILLE Bowling

## 2020-02-29 ENCOUNTER — AMB VIDEO VISIT (OUTPATIENT)
Dept: OTHER | Facility: HOSPITAL | Age: 40
End: 2020-02-29

## 2020-02-29 PROCEDURE — EVISIT: Performed by: FAMILY MEDICINE

## 2020-02-29 NOTE — CARE ANYWHERE EVISITS
Visit Summary for KEILA FERNÁNDEZ - Gender: Female - Date of Birth: [id number]  Date: [id number] - Duration: 1 minutes  Patient: Juana Garcia   [facility]  Provider: Jacinta Chin    Patient Contact Information  Address  [address]; [address] [place] [address] Drive  [phone]    Visit Topics  Pink eye [Added By: Self - 2020-02-29]    Conversation Transcripts  [0A][0A] [Notification] Kiesha Ang, [employer] Staff, will help you prepare for your visit  She is assisting Jacinta Chin, Family Physician [0A][Jda Hidalgo] Mira, and thank you for connecting  While you are waiting for the doctor, are there any   questions I can answer for you about our service? Please contact customer service if you have questions about billing, insurance, or technical issues  Visits work best with a stable WiFi connection, so please make sure you are connected before we   begin [0A][Notification] Kiesha Ang has left the room  [0A][KEILA FERNÁNDEZ] No thank you[0A][Notification] You are connected with Jacinta Chin, Family Physician [0A][Notification] Rola Goddard is located in [place]  [0A][Notification]   Rola Goddard has shared health history  Jad Bhatia  [0A]    Diagnosis  Other mucopurulent conjunctivitis, left eye    Procedures  Value: [id number] Code: CPT-4 UNLISTED E&M SERVICE    Medications Prescribed    Ocuflox  Dose : 1 drop  Route : ophthalmic  Frequency : 4 times a day  Until directed to stop  Patient Instructions : Instill into left eye for 5 days  Refills : 0  Instructions to the Pharmacist : Substitutions allowed      Provider Notes  [0A][0A] Please be sure to share the details of this visit with your pcp[0A]Mode of Communication: video[0A]History: The patient is a 35 y/o female who states that two of her children were diagnosed with pink eye and she has been administering their   eyedrops  She now has irritation of left eye with redness   Started last night with mild redness and this morning had yellow discharge, more redness and stuck together eyelids  No URI  Vision is ok  contact lenses: no  No photophobia or trauma  [0A]Past   medical history: healthy female [0A]PSH: none[0A]Medications: none[0A]Allergies: nkda[0A]PE: [0A]Gen:well appearing [0A]Eyes: left eye with bulbar and palpebral conjunctival injection; yellow discharge noted on lashes and eyelids   Extraocular movements   are intact, no proptosis  [0A]Nose: slightly congested [0A]Sinuses:non tender [0A]Assessment: Conjunctivitis [0A]Plan:[0A]1  Medications: ocuflox eye drops[0A]MDM: Pt with acute conjunctivitis, likely bacterial based on discharge type, acuity  Discussed   possible causes of red eye including allergic, viral, and bacterial, and foreign body  Given purulent discharge, lack of prior episodes of allergic conjunctivitis, no known trauma, and lack of severe photophobia, the patient and I made an informed shared   decision to assume bacterial conjunctivitis and start antibiotic drops  Begin eye drops as directed for 5-7 days, discussed how to administer drops and for how long, discussed hand washing and proper hygiene to reduce risk of spread to others  [0A]2  Home care:[0A]a  Wash hands after touching eye  May use saline eye drops as needed for comfort[0A]3  Return to work/school: May return after > 24 hours of treatment and symptoms improving[0A]4  Referral or follow up:[0A]a  As needed for worsening or if   no improvement in 3 days[0A]1  If you received a prescription at this visit and you have a question or problem please call [phone] for prescription assistance[0A]2  Please print a copy of this note and send it to your regular doctor, or take it to   your next visit so it may be included in your medical record[0A]3  Please see your primary care provider on an annual basis or more frequently if directed[0A]The patient voiced understanding and agreement with plan [0A][0A]    Electronically signed by:  Noa Sotelo(NPI [id number])

## 2020-06-03 ENCOUNTER — OFFICE VISIT (OUTPATIENT)
Dept: FAMILY MEDICINE CLINIC | Facility: CLINIC | Age: 40
End: 2020-06-03
Payer: COMMERCIAL

## 2020-06-03 VITALS
TEMPERATURE: 97.3 F | WEIGHT: 192.4 LBS | DIASTOLIC BLOOD PRESSURE: 86 MMHG | SYSTOLIC BLOOD PRESSURE: 132 MMHG | BODY MASS INDEX: 30.2 KG/M2 | HEART RATE: 80 BPM | OXYGEN SATURATION: 99 % | HEIGHT: 67 IN

## 2020-06-03 DIAGNOSIS — Z13.220 SCREENING FOR HYPERLIPIDEMIA: ICD-10-CM

## 2020-06-03 DIAGNOSIS — Z13.0 SCREENING FOR DEFICIENCY ANEMIA: ICD-10-CM

## 2020-06-03 DIAGNOSIS — D17.20 LIPOMA OF UPPER EXTREMITY, UNSPECIFIED LATERALITY: ICD-10-CM

## 2020-06-03 DIAGNOSIS — Z12.39 SCREENING FOR BREAST CANCER: Primary | ICD-10-CM

## 2020-06-03 DIAGNOSIS — Z13.1 SCREENING FOR DIABETES MELLITUS (DM): ICD-10-CM

## 2020-06-03 DIAGNOSIS — Z00.00 HEALTH MAINTENANCE EXAMINATION: ICD-10-CM

## 2020-06-03 PROCEDURE — 99396 PREV VISIT EST AGE 40-64: CPT | Performed by: FAMILY MEDICINE

## 2020-06-03 RX ORDER — MULTIVIT-MIN/IRON FUM/FOLIC AC 7.5 MG-4
1 TABLET ORAL DAILY
COMMUNITY
End: 2022-01-18 | Stop reason: ALTCHOICE

## 2020-09-01 ENCOUNTER — APPOINTMENT (OUTPATIENT)
Dept: LAB | Facility: CLINIC | Age: 40
End: 2020-09-01
Payer: COMMERCIAL

## 2020-09-01 ENCOUNTER — HOSPITAL ENCOUNTER (OUTPATIENT)
Dept: MAMMOGRAPHY | Facility: CLINIC | Age: 40
Discharge: HOME | End: 2020-09-01
Payer: COMMERCIAL

## 2020-09-01 VITALS — HEIGHT: 67 IN | BODY MASS INDEX: 30.13 KG/M2 | WEIGHT: 192 LBS

## 2020-09-01 DIAGNOSIS — Z13.220 SCREENING FOR HYPERLIPIDEMIA: ICD-10-CM

## 2020-09-01 DIAGNOSIS — Z13.1 SCREENING FOR DIABETES MELLITUS (DM): ICD-10-CM

## 2020-09-01 DIAGNOSIS — Z13.0 SCREENING FOR DEFICIENCY ANEMIA: ICD-10-CM

## 2020-09-01 DIAGNOSIS — Z12.39 SCREENING FOR BREAST CANCER: ICD-10-CM

## 2020-09-01 LAB
ALBUMIN SERPL BCP-MCNC: 4.1 G/DL (ref 3.5–5)
ALP SERPL-CCNC: 68 U/L (ref 46–116)
ALT SERPL W P-5'-P-CCNC: 39 U/L (ref 12–78)
ANION GAP SERPL CALCULATED.3IONS-SCNC: 4 MMOL/L (ref 4–13)
AST SERPL W P-5'-P-CCNC: 19 U/L (ref 5–45)
BASOPHILS # BLD AUTO: 0.05 THOUSANDS/ΜL (ref 0–0.1)
BASOPHILS NFR BLD AUTO: 1 % (ref 0–1)
BILIRUB SERPL-MCNC: 0.75 MG/DL (ref 0.2–1)
BUN SERPL-MCNC: 12 MG/DL (ref 5–25)
CALCIUM SERPL-MCNC: 8.9 MG/DL (ref 8.3–10.1)
CHLORIDE SERPL-SCNC: 105 MMOL/L (ref 100–108)
CHOLEST SERPL-MCNC: 194 MG/DL (ref 50–200)
CO2 SERPL-SCNC: 29 MMOL/L (ref 21–32)
CREAT SERPL-MCNC: 0.99 MG/DL (ref 0.6–1.3)
EOSINOPHIL # BLD AUTO: 0.13 THOUSAND/ΜL (ref 0–0.61)
EOSINOPHIL NFR BLD AUTO: 2 % (ref 0–6)
ERYTHROCYTE [DISTWIDTH] IN BLOOD BY AUTOMATED COUNT: 12 % (ref 11.6–15.1)
GFR SERPL CREATININE-BSD FRML MDRD: 72 ML/MIN/1.73SQ M
GLUCOSE P FAST SERPL-MCNC: 84 MG/DL (ref 65–99)
HCT VFR BLD AUTO: 44.4 % (ref 34.8–46.1)
HDLC SERPL-MCNC: 72 MG/DL
HGB BLD-MCNC: 14.7 G/DL (ref 11.5–15.4)
IMM GRANULOCYTES # BLD AUTO: 0.01 THOUSAND/UL (ref 0–0.2)
IMM GRANULOCYTES NFR BLD AUTO: 0 % (ref 0–2)
LDLC SERPL CALC-MCNC: 107 MG/DL (ref 0–100)
LYMPHOCYTES # BLD AUTO: 1.72 THOUSANDS/ΜL (ref 0.6–4.47)
LYMPHOCYTES NFR BLD AUTO: 29 % (ref 14–44)
MCH RBC QN AUTO: 29.5 PG (ref 26.8–34.3)
MCHC RBC AUTO-ENTMCNC: 33.1 G/DL (ref 31.4–37.4)
MCV RBC AUTO: 89 FL (ref 82–98)
MONOCYTES # BLD AUTO: 0.44 THOUSAND/ΜL (ref 0.17–1.22)
MONOCYTES NFR BLD AUTO: 7 % (ref 4–12)
NEUTROPHILS # BLD AUTO: 3.68 THOUSANDS/ΜL (ref 1.85–7.62)
NEUTS SEG NFR BLD AUTO: 61 % (ref 43–75)
NONHDLC SERPL-MCNC: 122 MG/DL
NRBC BLD AUTO-RTO: 0 /100 WBCS
PLATELET # BLD AUTO: 211 THOUSANDS/UL (ref 149–390)
PMV BLD AUTO: 11.9 FL (ref 8.9–12.7)
POTASSIUM SERPL-SCNC: 4.4 MMOL/L (ref 3.5–5.3)
PROT SERPL-MCNC: 7.4 G/DL (ref 6.4–8.2)
RBC # BLD AUTO: 4.99 MILLION/UL (ref 3.81–5.12)
SODIUM SERPL-SCNC: 138 MMOL/L (ref 136–145)
TRIGL SERPL-MCNC: 75 MG/DL
WBC # BLD AUTO: 6.03 THOUSAND/UL (ref 4.31–10.16)

## 2020-09-01 PROCEDURE — 80053 COMPREHEN METABOLIC PANEL: CPT

## 2020-09-01 PROCEDURE — 36415 COLL VENOUS BLD VENIPUNCTURE: CPT

## 2020-09-01 PROCEDURE — 77067 SCR MAMMO BI INCL CAD: CPT

## 2020-09-01 PROCEDURE — 85025 COMPLETE CBC W/AUTO DIFF WBC: CPT

## 2020-09-01 PROCEDURE — 80061 LIPID PANEL: CPT

## 2020-09-01 PROCEDURE — 77063 BREAST TOMOSYNTHESIS BI: CPT

## 2020-10-26 ENCOUNTER — IMMUNIZATIONS (OUTPATIENT)
Dept: FAMILY MEDICINE CLINIC | Facility: CLINIC | Age: 40
End: 2020-10-26
Payer: COMMERCIAL

## 2020-10-26 DIAGNOSIS — Z23 ENCOUNTER FOR IMMUNIZATION: ICD-10-CM

## 2020-10-26 PROCEDURE — 90471 IMMUNIZATION ADMIN: CPT

## 2020-10-26 PROCEDURE — 90686 IIV4 VACC NO PRSV 0.5 ML IM: CPT

## 2021-04-10 ENCOUNTER — TELEPHONE (OUTPATIENT)
Dept: FAMILY MEDICINE CLINIC | Facility: CLINIC | Age: 41
End: 2021-04-10

## 2021-08-05 ENCOUNTER — AMB VIDEO VISIT (OUTPATIENT)
Dept: OTHER | Facility: HOSPITAL | Age: 41
End: 2021-08-05

## 2021-08-05 PROCEDURE — ECARE PR SL URGENT CARE VIRTUAL VISIT: Performed by: SPECIALIST

## 2021-08-13 ENCOUNTER — IMMUNIZATIONS (OUTPATIENT)
Dept: FAMILY MEDICINE CLINIC | Facility: HOSPITAL | Age: 41
End: 2021-08-13

## 2021-08-13 DIAGNOSIS — Z23 ENCOUNTER FOR IMMUNIZATION: Primary | ICD-10-CM

## 2021-08-13 PROCEDURE — 0001A SARS-COV-2 / COVID-19 MRNA VACCINE (PFIZER-BIONTECH) 30 MCG: CPT

## 2021-08-13 PROCEDURE — 91300 SARS-COV-2 / COVID-19 MRNA VACCINE (PFIZER-BIONTECH) 30 MCG: CPT

## 2021-09-03 ENCOUNTER — IMMUNIZATIONS (OUTPATIENT)
Dept: FAMILY MEDICINE CLINIC | Facility: HOSPITAL | Age: 41
End: 2021-09-03

## 2021-09-03 DIAGNOSIS — Z23 ENCOUNTER FOR IMMUNIZATION: Primary | ICD-10-CM

## 2021-09-03 PROCEDURE — 0002A SARS-COV-2 / COVID-19 MRNA VACCINE (PFIZER-BIONTECH) 30 MCG: CPT

## 2021-09-03 PROCEDURE — 91300 SARS-COV-2 / COVID-19 MRNA VACCINE (PFIZER-BIONTECH) 30 MCG: CPT

## 2021-11-29 PROBLEM — Z01.419 ENCOUNTER FOR GYNECOLOGICAL EXAMINATION WITHOUT ABNORMAL FINDING: Status: ACTIVE | Noted: 2021-11-29

## 2022-01-18 NOTE — PATIENT INSTRUCTIONS
Breast Self Exam for Women   AMBULATORY CARE:   A breast self-exam (BSE)  is a way to check your breasts for lumps and other changes  Regular BSEs can help you know how your breasts normally look and feel  Most breast lumps or changes are not cancer, but you should always have them checked by a healthcare provider  Why you should do a BSE:  Breast cancer is the most common type of cancer in women  Even if you have mammograms, you may still want to do a BSE regularly  If you know how your breasts normally feel and look, it may help you know when to contact your healthcare provider  Mammograms can miss some cancers  You may find a lump during a BSE that did not show up on a mammogram   When you should do a BSE:  If you have periods, you may want to do your BSE 1 week after your period ends  This is the time when your breasts may be the least swollen, lumpy, or tender  You can do regular BSEs even if you are breastfeeding or have breast implants  Call your doctor if:   · You find any lumps or changes in your breasts  · You have breast pain or fluid coming from your nipples  · You have questions or concerns about your condition or care  How to do a BSE:       · Look at your breasts in a mirror  Look at the size and shape of each breast and nipple  Check for swelling, lumps, dimpling, scaly skin, or other skin changes  Look for nipple changes, such as a nipple that is painful or beginning to pull inward  Gently squeeze both nipples and check to see if fluid (that is not breast milk) comes out of them  If you find any of these or other breast changes, contact your healthcare provider  Check your breasts while you sit or  the following 3 positions:    ? Hang your arms down at your sides  ? Raise your hands and join them behind your head  ? Put firm pressure with your hands on your hips  Bend slightly forward while you look at your breasts in the mirror  · Lie down and feel your breasts    When you lie down, your breast tissue spreads out evenly over your chest  This makes it easier for you to feel for lumps and anything that may not be normal for your breasts  Do a BSE on one breast at a time  ? Place a small pillow or towel under your left shoulder  Put your left arm behind your head  ? Use the 3 middle fingers of your right hand  Use your fingertip pads, on the top of your fingers  Your fingertip pad is the most sensitive part of your finger  ? Use small circles to feel your breast tissue  Use your fingertip pads to make dime-sized, overlapping circles on your breast and armpits  Use light, medium, and firm pressure  First, press lightly  Second, press with medium pressure to feel a little deeper into the breast  Last, use firm pressure to feel deep within your breast     ? Examine your entire breast area  Examine the breast area from above the breast to below the breast where you feel only ribs  Make small circles with your fingertips, starting in the middle of your armpit  Make circles going up and down the breast area  Continue toward your breast and all the way across it  Examine the area from your armpit all the way over to the middle of your chest (breastbone)  Stop at the middle of your chest     ? Move the pillow or towel to your right shoulder, and put your right arm behind your head  Use the 3 fingertip pads of your left hand, and repeat the above steps to do a BSE on your right breast     What else you can do to check for breast problems or cancer:  Talk to your healthcare provider about mammograms  A mammogram is an x-ray of your breasts to screen for breast cancer or other problems  Your provider can tell you the benefits and risks of mammograms  The first mammogram is usually at age 39 or 48  Your provider may recommend you start at 36 or younger if your risk for breast cancer is high  Mammograms usually continue every 1 to 2 years until age 76         Follow up with your doctor as directed:  Write down your questions so you remember to ask them during your visits  © Copyright [employer] 2021 Information is for End User's use only and may not be sold, redistributed or otherwise used for commercial purposes  All illustrations and images included in CareNotes® are the copyrighted property of [employer]  or Donn Hawley  The above information is an  only  It is not intended as medical advice for individual conditions or treatments  Talk to your doctor, nurse or pharmacist before following any medical regimen to see if it is safe and effective for you  Wellness Visit for Adults   AMBULATORY CARE:   A wellness visit  is when you see your healthcare provider to get screened for health problems  Your healthcare provider will also give you advice on how to stay healthy  Write down your questions so you remember to ask them  Ask your healthcare provider how often you should have a wellness visit  What happens at a wellness visit:  Your healthcare provider will ask about your health, and your family history of health problems  This includes high blood pressure, heart disease, and cancer  He or she will ask if you have symptoms that concern you, if you smoke, and about your mood  You may also be asked about your intake of medicines, supplements, food, and alcohol  Any of the following may be done:  · Your weight  will be checked  Your height may also be checked so your body mass index (BMI) can be calculated  Your BMI shows if you are at a healthy weight  · Your blood pressure  and heart rate will be checked  Your temperature may also be checked  · Blood and urine tests  may be done  Blood tests may be done to check your cholesterol levels  Abnormal cholesterol levels increase your risk for heart disease and stroke  You may also need a blood or urine test to check for diabetes if you are at increased risk   Urine tests may be done to look for signs of an infection or kidney disease  · A physical exam  includes checking your heartbeat and lungs with a stethoscope  Your healthcare provider may also check your skin to look for sun damage  · Screening tests  may be recommended  A screening test is done to check for diseases that may not cause symptoms  The screening tests you may need depend on your age, gender, family history, and lifestyle habits  For example, colorectal screening may be recommended if you are 48years old or older  Screening tests you need if you are a woman:   · A Pap smear  is used to screen for cervical cancer  Pap smears are usually done every 3 to 5 years depending on your age  You may need them more often if you have had abnormal Pap smear test results in the past  Ask your healthcare provider how often you should have a Pap smear  · A mammogram  is an x-ray of your breasts to screen for breast cancer  Experts recommend mammograms every 2 years starting at age 48 years  You may need a mammogram at age 52 years or younger if you have an increased risk for breast cancer  Talk to your healthcare provider about when you should start having mammograms and how often you need them  Vaccines you may need:   · Get an influenza vaccine  every year  The influenza vaccine protects you from the flu  Several types of viruses cause the flu  The viruses change over time, so new vaccines are made each year  · Get a tetanus-diphtheria (Td) booster vaccine  every 10 years  This vaccine protects you against tetanus and diphtheria  Tetanus is a severe infection that may cause painful muscle spasms and lockjaw  Diphtheria is a severe bacterial infection that causes a thick covering in the back of your mouth and throat  · Get a human papillomavirus (HPV) vaccine  if you are female and aged 23 to 32 or male 23 to 24 and never received it  This vaccine protects you from HPV infection  HPV is the most common infection spread by sexual contact   HPV may also cause vaginal, penile, and anal cancers  · Get a pneumococcal vaccine  if you are aged 72 years or older  The pneumococcal vaccine is an injection given to protect you from pneumococcal disease  Pneumococcal disease is an infection caused by pneumococcal bacteria  The infection may cause pneumonia, meningitis, or an ear infection  · Get a shingles vaccine  if you are 60 or older, even if you have had shingles before  The shingles vaccine is an injection to protect you from the varicella-zoster virus  This is the same virus that causes chickenpox  Shingles is a painful rash that develops in people who had chickenpox or have been exposed to the virus  How to eat healthy:  My Plate is a model for planning healthy meals  It shows the types and amounts of foods that should go on your plate  Fruits and vegetables make up about half of your plate, and grains and protein make up the other half  A serving of dairy is included on the side of your plate  The amount of calories and serving sizes you need depends on your age, gender, weight, and height  Examples of healthy foods are listed below:  · Eat a variety of vegetables  such as dark green, red, and orange vegetables  You can also include canned vegetables low in sodium (salt) and frozen vegetables without added butter or sauces  · Eat a variety of fresh fruits , canned fruit in 100% juice, frozen fruit, and dried fruit  · Include whole grains  At least half of the grains you eat should be whole grains  Examples include whole-wheat bread, wheat pasta, brown rice, and whole-grain cereals such as oatmeal     · Eat a variety of protein foods such as seafood (fish and shellfish), lean meat, and poultry without skin (turkey and chicken)  Examples of lean meats include pork leg, shoulder, or tenderloin, and beef round, sirloin, tenderloin, and extra lean ground beef   Other protein foods include eggs and egg substitutes, beans, peas, soy products, nuts, and seeds  · Choose low-fat dairy products such as skim or 1% milk or low-fat yogurt, cheese, and cottage cheese  · Limit unhealthy fats  such as butter, hard margarine, and shortening  Exercise:  Exercise at least 30 minutes per day on most days of the week  Some examples of exercise include walking, biking, dancing, and swimming  You can also fit in more physical activity by taking the stairs instead of the elevator or parking farther away from stores  Include muscle strengthening activities 2 days each week  Regular exercise provides many health benefits  It helps you manage your weight, and decreases your risk for type 2 diabetes, heart disease, stroke, and high blood pressure  Exercise can also help improve your mood  Ask your healthcare provider about the best exercise plan for you  General health and safety guidelines:   · Do not smoke  Nicotine and other chemicals in cigarettes and cigars can cause lung damage  Ask your healthcare provider for information if you currently smoke and need help to quit  E-cigarettes or smokeless tobacco still contain nicotine  Talk to your healthcare provider before you use these products  · Limit alcohol  A drink of alcohol is 12 ounces of beer, 5 ounces of wine, or 1½ ounces of liquor  · Lose weight, if needed  Being overweight increases your risk of certain health conditions  These include heart disease, high blood pressure, type 2 diabetes, and certain types of cancer  · Protect your skin  Do not sunbathe or use tanning beds  Use sunscreen with a SPF 15 or higher  Apply sunscreen at least 15 minutes before you go outside  Reapply sunscreen every 2 hours  Wear protective clothing, hats, and sunglasses when you are outside  · Drive safely  Always wear your seatbelt  Make sure everyone in your car wears a seatbelt  A seatbelt can save your life if you are in an accident  Do not use your cell phone when you are driving   This could distract you and cause an accident  Pull over if you need to make a call or send a text message  · Practice safe sex  Use latex condoms if are sexually active and have more than one partner  Your healthcare provider may recommend screening tests for sexually transmitted infections (STIs)  · Wear helmets, lifejackets, and protective gear  Always wear a helmet when you ride a bike or motorcycle, go skiing, or play sports that could cause a head injury  Wear protective equipment when you play sports  Wear a lifejacket when you are on a boat or doing water sports  © Copyright [address] 2021 Information is for End User's use only and may not be sold, redistributed or otherwise used for commercial purposes  All illustrations and images included in CareNotes® are the copyrighted property of [employer]  or Donn Hawley  The above information is an  only  It is not intended as medical advice for individual conditions or treatments  Talk to your doctor, nurse or pharmacist before following any medical regimen to see if it is safe and effective for you  HPV (Human Papillomavirus) Vaccine for Adults   AMBULATORY CARE:   The human papillomavirus (HPV) vaccine  is an injection given to females and males to protect against human papillomavirus infection  HPV is most commonly spread through sexual activity  It can also be spread from a mother to her baby during delivery  The HPV vaccine is most effective if given before sexual activity begins  This allows your body to build almost complete protection against HPV before you have contact with the virus  The HPV vaccine is still effective after sexual activity has begun  How the vaccine is given:  The HPV vaccine can be given with other vaccines  The vaccine is given in 2 or 3 doses through age 32:  · The first dose  is given at any time  · The second dose  is given 1 to 2 months after the first dose      · The third dose, if needed,  is given 6 months after the first dose  Reasons you should not get the HPV vaccine, or should wait to get it:   · You had a severe allergic reaction to a dose of the vaccine  · You are pregnant  Your healthcare provider will tell you when you can get the vaccine  · You are sick or have a fever  You may need to wait to get the vaccine until symptoms go away  Risks of the HPV vaccine: You may have pain, redness, or swelling where the shot was given  You may have a fever or headache  You may have an allergic reaction to the vaccine  This can be life-threatening  Call your local emergency number (911 in the [address]) if:   · You have signs of a severe allergic reaction, such as trouble breathing, hives, or wheezing  Seek care immediately if:   · You have a high fever or behavior changes that concern you  Call your doctor if:   · You have questions or concerns about the HPV vaccine  Apply a warm compress  to the area to relieve swelling and pain  Follow up with your doctor as directed:  Write down your questions so you remember to ask them during your visits  © Copyright [employer] 2021 Information is for End User's use only and may not be sold, redistributed or otherwise used for commercial purposes  All illustrations and images included in CareNotes® are the copyrighted property of [employer]  or [address] [place], [place] [address]bianca dav   The above information is an  only  It is not intended as medical advice for individual conditions or treatments  Talk to your doctor, nurse or pharmacist before following any medical regimen to see if it is safe and effective for you  Perineal Hygiene     No soaps or feminine wash to the vulva  Use only water to cleanse, or water with Dove or [employer] if necessary  No lotion to the area    Use only coconut oil for moisture if needed   No douching     Cotton underware, loose fitting clothing  Only perfume-free, dye-free laundry detergent, use a second rinse cycle   Avoid fabric softeners/dryer sheets  Coconut oil as a lubricant (if not using condoms) or another scent-free lubricant (Astroglide, Uberlube) if needed  Partner to avoid the same products as well  Over the counter probiotic to restore vaginal cindy may be helpful as well     You may also look into Boric Acid vaginal suppositories to restore vaginal PH balance for up to 2 weeks as directed on the box  You may not use these if you are pregnant    Kegel Exercises for Women   AMBULATORY CARE:   Kegel exercises  help strengthen your pelvic muscles  Pelvic muscles hold your pelvic organs, such as your bladder and uterus, in place  Kegel exercises help prevent or control problems with urine incontinence (leakage)  Incontinence may be caused by pregnancy, childbirth, or menopause  Contact your healthcare provider if:   · You cannot feel your muscles tighten or relax  · You continue to leak urine  · You have questions or concerns about your condition or care  Use the correct muscles:  Pelvic muscles are the muscles you use to control urine flow  To target these muscles, stop and start the flow of urine several times  This will help you become familiar with how it feels to tighten and relax these muscles  How to do Kegel exercises:   · Empty your bladder  You may lie down, stand up, or sit down to do these exercises  When you first try to do these exercises, it may be easier if you lie down  Tighten or squeeze your pelvic muscles slowly  It may feel like you are trying to hold back urine or gas  Hold this position for 3 seconds  Relax for 3 seconds  Repeat this cycle 10 times  · Do 10 sets of Kegel exercises, at least 3 times a day  Do not hold your breath when you do Kegel exercises  Keep your stomach, back, and leg muscles relaxed  · As your muscles get stronger, you will be able to hold the squeeze longer   Your healthcare provider may ask that you increase your pelvic muscle squeeze to 10 seconds  After you squeeze for 10 seconds, relax for 10 seconds  What else you should know:   · Once you know how to do Kegel exercises, use different positions  You can do these exercises while you lie on the floor, sit at your desk or watch TV, and while you stand  · You may notice improved bladder control within about 6 weeks  · Tighten your pelvic muscles before you sneeze, cough, or lift to prevent urine leakage  Follow up with your doctor as directed:  Write down your questions so you remember to ask them during your visits  © Copyright [employer] 2021 Information is for End User's use only and may not be sold, redistributed or otherwise used for commercial purposes  All illustrations and images included in CareNotes® are the copyrighted property of [employer]  or Donn Hawley  The above information is an  only  It is not intended as medical advice for individual conditions or treatments  Talk to your doctor, nurse or pharmacist before following any medical regimen to see if it is safe and effective for you

## 2022-01-18 NOTE — PROGRESS NOTES
Diagnoses and all orders for this visit:    Encounter for gynecological examination without abnormal finding  -     Liquid-based pap, screening    Encounter for screening mammogram for malignant neoplasm of breast  -     Mammo screening bilateral w cad; Future    Breast nodule  -     Mammo diagnostic left w 3d & cad; Future        Health Maintenance:    Last PAP:  3/30/2016 Neg/Neg   Next PAP Due: collected today     Last Mammogram: 09/01/2020  Next Mammogram:order given for next as well as diagnostic for left breast     Last Colonoscopy: advised age 39    Gardisil:   Not completed , declines    Had COVID vaccines x 2     Pleasant 43 y o  premenopausal female here for annual exam  [id number],  GYN hx includes:  No personal or family hx of breast, cervical, ovarian or colon CA  Reports regular cycles, with mod flow, & mod cramping  Denies history of abnormal pap smears  Denies vaginal, urinary or breast issues, today  Denies pelvic pain & dyspareunia  Sexually active  Monogamous relationship,declines STD testing including/excluding blood work      Denies any issues with her BCM, which is male vasectomy  Denies intimate partner violence    3 boys, 1 girl   Was a , stays 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    Past Medical History:   Diagnosis Date    Migraine     Varicella      Past Surgical History:   Procedure Laterality Date    ANTERIOR CRUCIATE LIGAMENT REPAIR      VAGINAL DELIVERY      x 3       Immunization History   Administered Date(s) Administered    COVID-19 PFIZER VACCINE 0 3 ML IM 08/13/2021, 09/03/2021    INFLUENZA 12/01/2015    Influenza Injectable, MDCK, Preservative Free, Quadrivalent, 0 5 mL 11/05/2019    Influenza, injectable, quadrivalent, preservative free 0 5 mL 10/26/2020    Influenza, seasonal, injectable, preservative free 12/01/2015    MMR 07/10/2016    Tdap 03/24/2016       Family History   Problem Relation Age of Onset    Cancer Father         urinary bladder - unspecified site    Hypertension Father     Heart block Father     Early death Maternal Aunt     Heart disease Maternal Aunt     Diabetes Maternal Grandmother         type 2 - diet controlled    Arthritis Maternal Grandmother     Asthma Maternal Grandmother     Heart disease Maternal Grandmother     Hypertension Maternal Grandmother     Heart disease Maternal Grandfather     Hypertension Maternal Grandfather     Cancer Paternal Grandmother         urinary bladder - unspecified site    Depression Paternal Grandmother     Hypertension Paternal Grandmother     Hypertension Paternal Grandfather     Heart block Paternal Grandfather     Heart disease Paternal Grandfather         ill-defined    Hypertension Other     No Known Problems Sister     No Known Problems Daughter      Social History     Tobacco Use    Smoking status: Never Smoker    Smokeless tobacco: Never Used   Substance Use Topics    Alcohol use: Yes     Comment: rarely     Drug use: No     No current outpatient medications on file    Patient Active Problem List    Diagnosis Date Noted    Breast nodule 2022    Encounter for screening mammogram for malignant neoplasm of breast 2022    Encounter for gynecological examination without abnormal finding 2021    Carpal tunnel syndrome of right wrist 2020    Triceps tendinitis 2019    Post-viral cough syndrome 2018       Allergies   Allergen Reactions    Pollen Extract Itching       OB History    Para Term  AB Living   4 4 4 0 0 4   SAB IAB Ectopic Multiple Live Births   0 0 0 0 4      # Outcome Date GA Lbr Christopher/2nd Weight Sex Delivery Anes PTL Lv   4 Term 16 41w1d / 00:02 3725 g (8 lb 3 4 oz) F Vag-Spont EPI N MADI      Birth Comments: head circumference per SBAR of charting on Baby Girl [place]   3 Term 10/10/14    M Vag-Spont   MADI   2 Term 07/15/13    M    MADI   1 Term 10/01/11    M    MADI      Obstetric Comments   Menarche: 15 years old  Vitals:    01/19/22 1506   BP: 122/70   BP Location: Right arm   Patient Position: Sitting   Cuff Size: Standard   Weight: 90 3 kg (199 lb)   Height: 5' 7" (1 702 m)     Body mass index is 31 17 kg/m²  Review of Systems     Constitutional: Negative for chills, fatigue, fever, headaches, visual disturbances, and unexpected weight change  Respiratory: Negative for cough, & shortness of breath  Cardiovascular: Negative for chest pain       Gastrointestinal: Negative for Abd pain, nausea & vomiting, constipation and diarrhea  Genitourinary: Negative for difficulty urinating, dysuria, hematuria, dyspareunia, unusual vaginal bleeding or discharge  Skin: Negative skin changes    Physical Exam     Constitutional: Alert & Oriented x3, well-developed and well-nourished  No distress  HENT: Atraumatic, Normocephalic, Conjunctivae clear  Neck: Normal range of motion  Neck supple  No thyromegaly, mass, nodules or tenderness  Pulmonary: Effort normal  Lungs clear to ascultation bilateral  Cardiac: RRR, no murmur   Abdominal: Soft  No tenderness or masses  Musculoskeletal: Normal ROM  Skin: Warm & Dry  Psychological: Normal mood, thought content, behavior & judgement     Breasts:   Right: tissue soft without masses, tenderness, skin changes or nipple discharge  No areas of erythema or pain  No subclavicular, axillary, pectoral adenopathy  Left:  tissue soft without tenderness, skin changes or nipple discharge  No areas of erythema or pain  No subclavicular, axillary, pectoral adenopathy  Small nodule noted at 2 o'clock, ovoid in shape larger than a grain of rice    Pelvic exam was performed with patient supine, lithotomy position  Labia: Negative rash, tenderness, lesion or injury on the right labia  Negative rash, tenderness, lesion or injury on the left labia  Urethral meatus:  Negative for  tenderness, inflammation or discharge  Uterus: not deviated, enlarged, fixed or tender  Cervix: No CMT, no discharge or friability  Right adnexa: no mass, no tenderness and no fullness  Left adnexa: no mass, no tenderness and no fullness  Vagina: No erythema, tenderness, masses, or foreign body in the vagina  No signs of injury around the vagina  No unusual vaginal discharge   Perineum without lesions, signs of injury, erythema or swelling  Inguinal Canal:        Right: No inguinal adenopathy or hernia present  Left: No inguinal adenopathy or hernia present  Perineal hygiene reviewed   Weight bearing exercises minium of 150 mins/weekly advised  Kegel exercises recommended  SBE encouraged, ASCCP guidelines reviewed  Condoms encouraged with all sexual activity to prevent STI's  Gardisil vaccines recommended up to age 39  Calcium/ Vit D dietary requirements discussed,   Advised to call with any issues,  all concerns & questions addressed     See provided information in your after visit summary     F/U Annually and PRN

## 2022-01-19 ENCOUNTER — ANNUAL EXAM (OUTPATIENT)
Dept: OBGYN CLINIC | Facility: CLINIC | Age: 42
End: 2022-01-19
Payer: COMMERCIAL

## 2022-01-19 VITALS
WEIGHT: 199 LBS | DIASTOLIC BLOOD PRESSURE: 70 MMHG | HEIGHT: 67 IN | SYSTOLIC BLOOD PRESSURE: 122 MMHG | BODY MASS INDEX: 31.23 KG/M2

## 2022-01-19 DIAGNOSIS — N63.0 BREAST NODULE: ICD-10-CM

## 2022-01-19 DIAGNOSIS — Z01.419 ENCOUNTER FOR GYNECOLOGICAL EXAMINATION WITHOUT ABNORMAL FINDING: Primary | ICD-10-CM

## 2022-01-19 DIAGNOSIS — Z12.31 ENCOUNTER FOR SCREENING MAMMOGRAM FOR MALIGNANT NEOPLASM OF BREAST: ICD-10-CM

## 2022-01-19 PROBLEM — Z00.00 HEALTH MAINTENANCE EXAMINATION: Status: RESOLVED | Noted: 2018-06-04 | Resolved: 2022-01-19

## 2022-01-19 PROBLEM — D17.20 LIPOMA OF UPPER EXTREMITY: Status: RESOLVED | Noted: 2020-06-03 | Resolved: 2022-01-19

## 2022-01-19 PROCEDURE — G0476 HPV COMBO ASSAY CA SCREEN: HCPCS | Performed by: OBSTETRICS & GYNECOLOGY

## 2022-01-19 PROCEDURE — S0612 ANNUAL GYNECOLOGICAL EXAMINA: HCPCS | Performed by: OBSTETRICS & GYNECOLOGY

## 2022-01-19 PROCEDURE — G0145 SCR C/V CYTO,THINLAYER,RESCR: HCPCS | Performed by: OBSTETRICS & GYNECOLOGY

## 2022-01-19 NOTE — PROGRESS NOTES
Last pap smear :03/30/2016 negative    Last HPV screening :03/30/2016 negative    LMP 01/02/2022    Last mammogram :09/01/2020 negative    Last colonoscopy :NONE    Last Dexa scan :NONE    Sexually Active : YES     Postmenopausal :    Postmenopausal Bleeding :    G 4  P 4  vaginal  :    Smoke :NEVER     Alcohol :Social     Drugs :NO    Have you had the HPV vaccines : NO    Any family cancer history :Father urinary cancer , PMG urinary cancer  Patient reports that she is doing well and has NO concerns at today's visit

## 2022-01-20 LAB
HPV HR 12 DNA CVX QL NAA+PROBE: NEGATIVE
HPV16 DNA CVX QL NAA+PROBE: NEGATIVE
HPV18 DNA CVX QL NAA+PROBE: NEGATIVE

## 2022-01-25 ENCOUNTER — HOSPITAL ENCOUNTER (OUTPATIENT)
Dept: MAMMOGRAPHY | Facility: CLINIC | Age: 42
Discharge: HOME | End: 2022-01-25
Payer: COMMERCIAL

## 2022-01-25 ENCOUNTER — HOSPITAL ENCOUNTER (OUTPATIENT)
Dept: ULTRASOUND IMAGING | Facility: CLINIC | Age: 42
Discharge: HOME | End: 2022-01-25
Payer: COMMERCIAL

## 2022-01-25 VITALS — HEIGHT: 67 IN | WEIGHT: 195 LBS | BODY MASS INDEX: 30.61 KG/M2

## 2022-01-25 DIAGNOSIS — N63.0 UNSPECIFIED LUMP IN UNSPECIFIED BREAST: ICD-10-CM

## 2022-01-25 DIAGNOSIS — N63.0 BREAST NODULE: ICD-10-CM

## 2022-01-25 LAB
LAB AP GYN PRIMARY INTERPRETATION: NORMAL
Lab: NORMAL

## 2022-01-25 PROCEDURE — G0279 TOMOSYNTHESIS, MAMMO: HCPCS

## 2022-01-25 PROCEDURE — 77066 DX MAMMO INCL CAD BI: CPT

## 2022-01-25 PROCEDURE — 76642 ULTRASOUND BREAST LIMITED: CPT

## 2022-04-18 ENCOUNTER — OFFICE VISIT (OUTPATIENT)
Dept: FAMILY MEDICINE CLINIC | Facility: CLINIC | Age: 42
End: 2022-04-18
Payer: COMMERCIAL

## 2022-04-18 VITALS
WEIGHT: 189 LBS | HEIGHT: 67 IN | HEART RATE: 60 BPM | OXYGEN SATURATION: 99 % | SYSTOLIC BLOOD PRESSURE: 130 MMHG | BODY MASS INDEX: 29.66 KG/M2 | DIASTOLIC BLOOD PRESSURE: 84 MMHG

## 2022-04-18 DIAGNOSIS — M23.91 INTERNAL DERANGEMENT OF RIGHT KNEE: Primary | ICD-10-CM

## 2022-04-18 PROCEDURE — 3725F SCREEN DEPRESSION PERFORMED: CPT | Performed by: FAMILY MEDICINE

## 2022-04-18 PROCEDURE — 99213 OFFICE O/P EST LOW 20 MIN: CPT | Performed by: FAMILY MEDICINE

## 2022-04-18 PROCEDURE — 3008F BODY MASS INDEX DOCD: CPT | Performed by: FAMILY MEDICINE

## 2022-04-18 NOTE — PROGRESS NOTES
BMI Counseling: Body mass index is 29 6 kg/m²  The BMI is above normal  Nutrition recommendations include decreasing portion sizes and moderation in carbohydrate intake  Exercise recommendations include exercising 3-5 times per week  No pharmacotherapy was ordered  Rationale for BMI follow-up plan is due to patient being overweight or obese  Depression Screening and Follow-up Plan: Patient was screened for depression during today's encounter  They screened negative with a PHQ-2 score of 0  Assessment/Plan:         Problem List Items Addressed This Visit        Musculoskeletal and Integument    Internal derangement of right knee - Primary    Relevant Orders    XR knee 4+ vw right injury            Subjective:      Patient ID: Jody Ceja is a 43 y o  female  Patient comes in 3 days after injuring her right knee while playing soccer she said she fell to the ground then had difficulty getting up and had to unlock her knee to stand up straight  She has had previous ACL replacement in the past       The following portions of the patient's history were reviewed and updated as appropriate:   Past Medical History:  She has a past medical history of Migraine and Varicella  ,  _______________________________________________________________________  Medical Problems:  does not have any pertinent problems on file ,  _______________________________________________________________________  Past Surgical History:   has a past surgical history that includes Anterior cruciate ligament repair and Vaginal delivery  ,  _______________________________________________________________________  Family History:  family history includes Arthritis in her maternal grandmother; Asthma in her maternal grandmother; Cancer in her father and paternal grandmother; Depression in her paternal grandmother; Diabetes in her maternal grandmother; Early death in her maternal aunt;  Heart block in her father and paternal grandfather; Heart disease in her maternal aunt, maternal grandfather, maternal grandmother, and paternal grandfather; Hypertension in her father, maternal grandfather, maternal grandmother, other, paternal grandfather, and paternal grandmother; No Known Problems in her daughter and sister  ,  _______________________________________________________________________  Social History:   reports that she has never smoked  She has never used smokeless tobacco  She reports current alcohol use  She reports that she does not use drugs  ,  _______________________________________________________________________  Allergies:  is allergic to pollen extract     _______________________________________________________________________  No current outpatient medications on file  No current facility-administered medications for this visit      _______________________________________________________________________  Review of Systems   Constitutional: Negative  Respiratory: Negative  Cardiovascular: Negative  Musculoskeletal: Positive for arthralgias  Objective:  Vitals:    04/18/22 1428   BP: 130/84   BP Location: Left arm   Patient Position: Sitting   Cuff Size: Large   Pulse: 60   SpO2: 99%   Weight: 85 7 kg (189 lb)   Height: 5' 7" (1 702 m)     Body mass index is 29 6 kg/m²  Physical Exam  Constitutional:       Appearance: Normal appearance  HENT:      Head: Normocephalic and atraumatic  Musculoskeletal:      Comments: Mild edema of right knee  No joint instability  Full range of motion   Neurological:      Mental Status: She is alert     Psychiatric:         Mood and Affect: Mood normal          Behavior: Behavior normal

## 2022-04-20 ENCOUNTER — HOSPITAL ENCOUNTER (OUTPATIENT)
Dept: RADIOLOGY | Facility: HOSPITAL | Age: 42
Discharge: HOME | End: 2022-04-20
Payer: COMMERCIAL

## 2022-04-20 DIAGNOSIS — M23.91 INTERNAL DERANGEMENT OF RIGHT KNEE: ICD-10-CM

## 2022-04-20 PROCEDURE — 73564 X-RAY EXAM KNEE 4 OR MORE: CPT

## 2022-04-24 DIAGNOSIS — M23.91 INTERNAL DERANGEMENT OF RIGHT KNEE: Primary | ICD-10-CM

## 2022-05-20 ENCOUNTER — HOSPITAL ENCOUNTER (OUTPATIENT)
Dept: MRI IMAGING | Facility: HOSPITAL | Age: 42
Discharge: HOME | End: 2022-05-20
Attending: FAMILY MEDICINE
Payer: COMMERCIAL

## 2022-05-20 DIAGNOSIS — M23.91 INTERNAL DERANGEMENT OF RIGHT KNEE: ICD-10-CM

## 2022-05-20 PROCEDURE — 73721 MRI JNT OF LWR EXTRE W/O DYE: CPT

## 2022-05-20 PROCEDURE — G1004 CDSM NDSC: HCPCS

## 2022-05-23 DIAGNOSIS — M23.91 INTERNAL DERANGEMENT OF RIGHT KNEE: Primary | ICD-10-CM

## 2022-06-09 ENCOUNTER — APPOINTMENT (EMERGENCY)
Dept: RADIOLOGY | Facility: HOSPITAL | Age: 42
End: 2022-06-09
Payer: COMMERCIAL

## 2022-06-09 ENCOUNTER — HOSPITAL ENCOUNTER (EMERGENCY)
Facility: HOSPITAL | Age: 42
Discharge: HOME | End: 2022-06-10
Attending: EMERGENCY MEDICINE
Payer: COMMERCIAL

## 2022-06-09 ENCOUNTER — APPOINTMENT (OUTPATIENT)
Dept: RADIOLOGY | Facility: CLINIC | Age: 42
End: 2022-06-09
Payer: COMMERCIAL

## 2022-06-09 ENCOUNTER — OFFICE VISIT (OUTPATIENT)
Dept: OBGYN CLINIC | Facility: CLINIC | Age: 42
End: 2022-06-09
Payer: COMMERCIAL

## 2022-06-09 VITALS
OXYGEN SATURATION: 99 % | SYSTOLIC BLOOD PRESSURE: 179 MMHG | HEART RATE: 73 BPM | TEMPERATURE: 97.9 F | RESPIRATION RATE: 18 BRPM | DIASTOLIC BLOOD PRESSURE: 109 MMHG

## 2022-06-09 VITALS
HEART RATE: 67 BPM | WEIGHT: 190 LBS | DIASTOLIC BLOOD PRESSURE: 103 MMHG | HEIGHT: 67 IN | SYSTOLIC BLOOD PRESSURE: 147 MMHG | BODY MASS INDEX: 29.82 KG/M2

## 2022-06-09 DIAGNOSIS — S83.206A RIGHT KNEE MENISCAL TEAR: Primary | ICD-10-CM

## 2022-06-09 DIAGNOSIS — M23.91 INTERNAL DERANGEMENT OF RIGHT KNEE: ICD-10-CM

## 2022-06-09 DIAGNOSIS — M25.561 RIGHT KNEE PAIN, UNSPECIFIED CHRONICITY: Primary | ICD-10-CM

## 2022-06-09 DIAGNOSIS — S83.281A TEAR OF LATERAL MENISCUS OF RIGHT KNEE, CURRENT, UNSPECIFIED TEAR TYPE, INITIAL ENCOUNTER: ICD-10-CM

## 2022-06-09 DIAGNOSIS — Z98.890 HISTORY OF REPAIR OF ACL: ICD-10-CM

## 2022-06-09 DIAGNOSIS — S83.241A OTHER TEAR OF MEDIAL MENISCUS, CURRENT INJURY, RIGHT KNEE, INITIAL ENCOUNTER: ICD-10-CM

## 2022-06-09 PROCEDURE — 99283 EMERGENCY DEPT VISIT LOW MDM: CPT

## 2022-06-09 PROCEDURE — 99204 OFFICE O/P NEW MOD 45 MIN: CPT | Performed by: ORTHOPAEDIC SURGERY

## 2022-06-09 PROCEDURE — 99284 EMERGENCY DEPT VISIT MOD MDM: CPT | Performed by: PHYSICIAN ASSISTANT

## 2022-06-09 PROCEDURE — 73560 X-RAY EXAM OF KNEE 1 OR 2: CPT

## 2022-06-09 RX ORDER — OXYCODONE HYDROCHLORIDE AND ACETAMINOPHEN 5; 325 MG/1; MG/1
1 TABLET ORAL EVERY 4 HOURS PRN
Qty: 11 TABLET | Refills: 0 | Status: SHIPPED | OUTPATIENT
Start: 2022-06-09 | End: 2022-06-27

## 2022-06-09 RX ORDER — OXYCODONE HYDROCHLORIDE AND ACETAMINOPHEN 5; 325 MG/1; MG/1
1 TABLET ORAL ONCE
Status: COMPLETED | OUTPATIENT
Start: 2022-06-09 | End: 2022-06-09

## 2022-06-09 RX ADMIN — OXYCODONE HYDROCHLORIDE AND ACETAMINOPHEN 1 TABLET: 5; 325 TABLET ORAL at 23:30

## 2022-06-09 NOTE — PROGRESS NOTES
Patient Name:  Dontae Contreras  MRN:  [id number]    [address] [place] [address]     1  Right knee pain, unspecified chronicity  -     XR knee 1 or 2 vw left; Future; Expected date: 06/09/2022  -     XR knee 1 or 2 vw right; Future; Expected date: 06/09/2022    2  Internal derangement of right knee  -     Ambulatory Referral to Orthopedic Surgery  -     XR knee 1 or 2 vw right; Future; Expected date: 06/09/2022    3  Other tear of medial meniscus, current injury, right knee, initial encounter  -     Durable Medical Equipment  -     Ambulatory Referral to Physical Therapy; Future    4  Tear of lateral meniscus of right knee, current, unspecified tear type, initial encounter  -     Durable Medical Equipment  -     Ambulatory Referral to Physical Therapy; Future    5  History of repair of ACL        43 y o  female with Right knee medial and lateral meniscus tears  X-rays and MRI reviewed in office today with patient  Discussed with patient most recent trauma likely etiology of current Right knee pain  Discussed nonopertive management consisting of outpatient PT to work on strengthening for support and decrease subjective instability episodes, corticosteroid injection  Surgical management of Right knee involving arthroscopy with partial menisectomy; also discussed possibility of meniscus tear pending intraoperative evaluation of meniscus tissue  At this time, patient would like to hold off on corticosteroid injections and move forward with outpatient PT to work on stability and strengthening  Can consider ACL brace to wear during higher impact activities; can assist with subtle rotational instability  If patient were to note swelling, locking, or worsening of pain, can discuss surgical intervention  Will follow up in office in 6-8 weeks for reevaluation of Right knee and will consider surgical intervention if symptoms persist or worsen       Chief Complaint     Right knee pain    History of the Present Illness     Marcelina Howie Nichols is a 43 y o  female with Right knee pain s/p injury about 1 month ago  She reports she was playing with her kids outside, twisted, and fell to the ground  She reports difficulty with weightbearing; sat into the ground, extended her knee and felt a pop which relieved her pain  She noted about 2-3 weeks of swelling of Right knee, which is decreased from initial injury  She reports improvement of pain since initial injury  Admits to intermittent subjective instability  Admits to history of Right knee ACL reconstruction in 1997 in [place], [place]  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  Review of Systems     Review of Systems   Constitutional: Negative for chills and fever  HENT: Negative for ear pain and sore throat  Eyes: Negative for pain and visual disturbance  Respiratory: Negative for cough and shortness of breath  Cardiovascular: Negative for chest pain and palpitations  Gastrointestinal: Negative for abdominal pain and vomiting  Genitourinary: Negative for dysuria and hematuria  Musculoskeletal: Negative for arthralgias and back pain  Skin: Negative for color change and rash  Neurological: Negative for seizures and syncope  All other systems reviewed and are negative  Physical Exam     BP (!) 147/103   Pulse 67   Ht 5' 7" (1 702 m)   Wt 86 2 kg (190 lb)   BMI 29 76 kg/m²     Right Knee  Range of motion from 0 to 130  There is mild crepitus with range of motion  There is no effusion  There is tenderness over the medial joint line  There is 5/5 quadriceps strength and preserved tone  The patient is able to perform a straight leg raise        negative patellar grind test   Anterior drawer tests is negative  negative Lachman Test    Posterior drawer test is   negative   Varus stress testing reveals no pain or laxity at 0 and 30 degrees   Valgus stress testing reveals no pain or laxity at 0 and 30 degrees  Prosper's testing is negative   Thessaly test negative  Guarding with pivot shift  The patient is neurovascular intact distally  Eyes:  Anicteric sclerae  Neck:  Supple  Lungs:  Normal respiratory effort  Cardiovascular:  Capillary refill is less than 2 seconds  Skin:  Intact without erythema  Neurologic:  Sensation grossly intact to light touch  Psychiatric:  Mood and affect are appropriate  Data Review     I have personally reviewed pertinent films in PACS, and my interpretation follows:    X-rays taken 06/09/2022 of Right knee demonstrates well maintained joint spaces without significant osteoarthritis  MRI performed of Right knee demonstrates posterior horizontal tear of lateral meniscus, posterior longitudinal tear of medial meniscus  Intact ACL graft  No acute fracture noted  Past Medical History:   Diagnosis Date    Migraine     Varicella        Past Surgical History:   Procedure Laterality Date    ANTERIOR CRUCIATE LIGAMENT REPAIR      VAGINAL DELIVERY      x 3       Allergies   Allergen Reactions    Pollen Extract Itching       No current outpatient medications on file prior to visit  No current facility-administered medications on file prior to visit         Social History     Tobacco Use    Smoking status: Never Smoker    Smokeless tobacco: Never Used   Vaping Use    Vaping Use: Never used   Substance Use Topics    Alcohol use: Yes     Comment: rarely     Drug use: No       Family History   Problem Relation Age of Onset    Cancer Father         urinary bladder - unspecified site    Hypertension Father     Heart block Father     Early death Maternal Aunt     Heart disease Maternal Aunt     Diabetes Maternal Grandmother         type 2 - diet controlled    Arthritis Maternal Grandmother     Asthma Maternal Grandmother     Heart disease Maternal Grandmother     Hypertension Maternal Grandmother     Heart disease Maternal Grandfather     Hypertension Maternal Grandfather     Cancer Paternal Grandmother         urinary bladder - unspecified site    Depression Paternal Grandmother     Hypertension Paternal Grandmother     Hypertension Paternal Grandfather     Heart block Paternal Grandfather     Heart disease Paternal Grandfather         ill-defined    Hypertension Other     No Known Problems Sister     No Known Problems Daughter              Procedures Performed     Procedures  None       Helder Monahan PA-C

## 2022-06-10 ENCOUNTER — TELEPHONE (OUTPATIENT)
Dept: OBGYN CLINIC | Facility: HOSPITAL | Age: 42
End: 2022-06-10

## 2022-06-10 NOTE — TELEPHONE ENCOUNTER
Hello,    Please advise if the following patient can be forced onto the schedule:    Patient: Nehal Mac    : 80    MRN: [id number]    Call back #: [phone]      Reason for appointment: patient knee collapsed, seen in ED but must be seen by Dr Elier Saunders ASAP    Requested doctor/location: Elier Saunders      Thank you      Email sent to JESUS BOWEN

## 2022-06-10 NOTE — ED PROVIDER NOTES
History  Chief Complaint   Patient presents with    Knee Pain     Stood up from the couch and turned and something happened, which made her fall      Patient is a 60-year-old female with no significant past medical history presenting to the emergency department for evaluation of right-sided knee pain and swelling after twisting it while getting off of her couch  She actually saw orthopedics earlier today as she injured this knee in April  She was diagnosed with a medial and lateral meniscal tear  She states that she has been experiencing pain to her right knee since April, however when she got off the couch tonight she felt excruciating pain  She is unable to bear any weight on her knee secondary to pain  She has pain with range of motion  She is denying any numbness or tingling  She is also reporting swelling to the right knee  No fevers, chills, chest pain, difficulty breathing  No other complaints at this time            None       Past Medical History:   Diagnosis Date    Migraine     Varicella        Past Surgical History:   Procedure Laterality Date    ANTERIOR CRUCIATE LIGAMENT REPAIR      VAGINAL DELIVERY      x 3       Family History   Problem Relation Age of Onset    Cancer Father         urinary bladder - unspecified site    Hypertension Father     Heart block Father     Early death Maternal Aunt     Heart disease Maternal Aunt     Diabetes Maternal Grandmother         type 2 - diet controlled    Arthritis Maternal Grandmother     Asthma Maternal Grandmother     Heart disease Maternal Grandmother     Hypertension Maternal Grandmother     Heart disease Maternal Grandfather     Hypertension Maternal Grandfather     Cancer Paternal Grandmother         urinary bladder - unspecified site    Depression Paternal Grandmother     Hypertension Paternal Grandmother     Hypertension Paternal Grandfather     Heart block Paternal Grandfather     Heart disease Paternal Grandfather ill-defined    Hypertension Other     No Known Problems Sister     No Known Problems Daughter      I have reviewed and agree with the history as documented  E-Cigarette/Vaping    E-Cigarette Use Never User      E-Cigarette/Vaping Substances    Nicotine No     THC No     CBD No     Flavoring No     Other No     Unknown No      Social History     Tobacco Use    Smoking status: Never Smoker    Smokeless tobacco: Never Used   Vaping Use    Vaping Use: Never used   Substance Use Topics    Alcohol use: Yes     Comment: rarely     Drug use: No       Review of Systems   Constitutional: Negative for chills, diaphoresis and fever  HENT: Negative for congestion, facial swelling, nosebleeds, sore throat and voice change  Eyes: Negative for pain and redness  Respiratory: Negative for cough, choking, chest tightness, shortness of breath and stridor  Cardiovascular: Negative for chest pain and palpitations  Gastrointestinal: Negative for abdominal pain, diarrhea, nausea and vomiting  Musculoskeletal: Positive for arthralgias, gait problem and joint swelling  Negative for back pain, myalgias, neck pain and neck stiffness  Skin: Negative for color change and rash  Neurological: Negative for dizziness, syncope, facial asymmetry, weakness, light-headedness, numbness and headaches  Psychiatric/Behavioral: Negative for confusion and suicidal ideas  All other systems reviewed and are negative  Physical Exam  Physical Exam  Vitals reviewed  Constitutional:       General: She is not in acute distress  Appearance: Normal appearance  She is not ill-appearing, toxic-appearing or diaphoretic  Comments: Patient appears uncomfortable secondary to pain   HENT:      Head: Normocephalic and atraumatic  Right Ear: External ear normal       Left Ear: External ear normal       Nose: Nose normal  No congestion or rhinorrhea        Mouth/Throat:      Mouth: Mucous membranes are moist  Pharynx: Oropharynx is clear  No oropharyngeal exudate or posterior oropharyngeal erythema  Eyes:      General: No scleral icterus  Right eye: No discharge  Left eye: No discharge  Extraocular Movements: Extraocular movements intact  Conjunctiva/sclera: Conjunctivae normal       Pupils: Pupils are equal, round, and reactive to light  Cardiovascular:      Rate and Rhythm: Normal rate and regular rhythm  Pulses: Normal pulses  Heart sounds: Normal heart sounds  No murmur heard  No friction rub  No gallop  Pulmonary:      Effort: Pulmonary effort is normal  No respiratory distress  Breath sounds: Normal breath sounds  No stridor  No wheezing, rhonchi or rales  Abdominal:      General: Abdomen is flat  Palpations: Abdomen is soft  Tenderness: There is no abdominal tenderness  There is no guarding or rebound  Musculoskeletal:      Cervical back: Normal range of motion and neck supple  Right knee: Swelling present  No bony tenderness  Decreased range of motion  Tenderness present over the medial joint line and lateral joint line  No LCL laxity, MCL laxity, ACL laxity or PCL laxity  Instability Tests: Anterior drawer test negative  Posterior drawer test negative  Anterior Lachman test negative  Medial Prosper test positive and lateral Prosper test positive  Right lower leg: No edema  Left lower leg: No edema  Skin:     General: Skin is warm and dry  Capillary Refill: Capillary refill takes less than 2 seconds  Neurological:      General: No focal deficit present  Mental Status: She is alert and oriented to person, place, and time     Psychiatric:         Mood and Affect: Mood normal          Behavior: Behavior normal          Vital Signs  ED Triage Vitals   Temperature Pulse Respirations Blood Pressure SpO2   06/09/22 2224 06/09/22 2224 06/09/22 2224 06/09/22 2224 06/09/22 2224   97 9 °F (36 6 °C) 73 18 (!) 179/109 99 % Temp Source Heart Rate Source Patient Position - Orthostatic VS BP Location FiO2 (%)   06/09/22 2224 06/09/22 2224 06/09/22 2224 06/09/22 2224 --   Tympanic Monitor Sitting Left arm       Pain Score       06/09/22 2330       6           Vitals:    06/09/22 2224   BP: (!) 179/109   Pulse: 73   Patient Position - Orthostatic VS: Sitting         Visual Acuity      ED Medications  Medications   oxyCODONE-acetaminophen (PERCOCET) 5-325 mg per tablet 1 tablet (1 tablet Oral Given 6/9/22 2330)       Diagnostic Studies  Results Reviewed     None                 No orders to display              Procedures  Procedures         ED Course                               SBIRT 22yo+    Flowsheet Row Most Recent Value   SBIRT (23 yo +)    In order to provide better care to our patients, we are screening all of our patients for alcohol and drug use  Would it be okay to ask you these screening questions? No Filed at: 06/09/2022 2351                    MDM  Number of Diagnoses or Management Options  Right knee meniscal tear  Diagnosis management comments: Patient presenting for evaluation of injury to her right knee  She was actually seen orthopedics earlier in the day for the same injury  She was diagnosed with a bilateral meniscus tear  She states that when she stood up from her couch earlier this evening she re-injured her knee and is now having significant pain, swelling, inability to ambulate secondary to pain  She had x-rays and MRIs performed at that time  Patient's exam is consistent with a bilateral meniscal tear with positive medial and lateral Prosper's test   No joint instability  She does have an effusion  I did discuss obtaining an x-ray to assess for any acute osseous abnormality, however patient and I both agree that this is likely a soft tissue injury and she has good follow-up with orthopedics  She was given oxycodone for pain control  She was placed in a knee immobilizer    She refused crutches that she has them with her  She was given information follow-up with orthopedics  Strict return precautions were discussed  She is in stable condition at time of discharge  Patient Progress  Patient progress: stable      Disposition  Final diagnoses:   Right knee meniscal tear     Time reflects when diagnosis was documented in both MDM as applicable and the Disposition within this note     Time User Action Codes Description Comment    6/9/2022 11:54 PM Aric Ramírez Add [[id number] [id number]] Right knee meniscal tear       ED Disposition     ED Disposition   Discharge    Condition   Stable    Date/Time   Thu Jun 9, 2022 11:54 PM    Comment   Bisi Heath discharge to home/self care  Follow-up Information     Follow up With Specialties Details Why Contact Info Additional 2000 [facility] Emergency Department Emergency Medicine Go to  If symptoms worsen 34 [facility] 109 [facility] Emergency Department, [address] [place] [address], [address] [place] [address] [facility] Schedule an appointment as soon as possible for a visit  for follow up [facility] [phone] 521 [facility], [id number] [place] [address], [phone]          Discharge Medication List as of 6/9/2022 11:57 PM      START taking these medications    Details   oxyCODONE-acetaminophen (Percocet) 5-325 mg per tablet Take 1 tablet by mouth every 4 (four) hours as needed for moderate pain for up to 11 doses Max Daily Amount: 6 tablets, Starting Thu 6/9/2022, Print             No discharge procedures on file      PDMP Review     None          ED Provider  Electronically Signed by           Gibran Hines PA-C  06/14/22 2027

## 2022-06-13 ENCOUNTER — OFFICE VISIT (OUTPATIENT)
Dept: OBGYN CLINIC | Facility: CLINIC | Age: 42
End: 2022-06-13
Payer: COMMERCIAL

## 2022-06-13 VITALS
HEIGHT: 67 IN | BODY MASS INDEX: 29.76 KG/M2 | SYSTOLIC BLOOD PRESSURE: 151 MMHG | HEART RATE: 62 BPM | DIASTOLIC BLOOD PRESSURE: 81 MMHG

## 2022-06-13 DIAGNOSIS — M25.461 EFFUSION OF RIGHT KNEE: ICD-10-CM

## 2022-06-13 DIAGNOSIS — M23.91 ACUTE INTERNAL DERANGEMENT OF RIGHT KNEE: Primary | ICD-10-CM

## 2022-06-13 PROCEDURE — 99214 OFFICE O/P EST MOD 30 MIN: CPT | Performed by: ORTHOPAEDIC SURGERY

## 2022-06-13 PROCEDURE — 20610 DRAIN/INJ JOINT/BURSA W/O US: CPT | Performed by: ORTHOPAEDIC SURGERY

## 2022-06-13 RX ORDER — IBUPROFEN 600 MG/1
TABLET ORAL EVERY 6 HOURS PRN
COMMUNITY

## 2022-06-13 NOTE — PROGRESS NOTES
Patient Name:  Mariah Baird  MRN:  [id number]    [address] [place] [address] Amanda     1  Acute internal derangement of right knee  -     MRI knee right  wo contrast; Future; Expected date: 06/13/2022  -     Large joint arthrocentesis: R knee    2  Effusion of right knee        43 y o  female with Right knee medial and lateral meniscus tear with previous ACL reconstruction  At this time, patient presents with moderate pain and positive special testing s/p most recent injury with new effusion; also demonstrates minimal to mild ACL graft laxity with Lachman  At last appointment, it was discussed with patient we would consider surgical intervention if pain and swelling persisted  In light of her symptoms and most recent injury, will move forward with new MRI to evaluate meniscus and ACL graft prior to moving forward with surgical intervention  She will move forward with MRI and follow up in office after resulted to discuss surgical intervention by means of Right knee arthroscopy, medial menisectomy vs possible repair  After discussion of MRI, advised patient we can aspirate Right knee to improve range of motion and pain  Patient wished to proceed forward  Risks and benefits of aspiration were discussed in detail  Risks including:  Post aspiration pain, bruising,  reaccumulation of effusion, and infection were discussed in detail  The patient understood and elected to proceed forward  After sterile preparation the Right was aspirated and 21cc of synovial and bloody fluid were removed  The patient tolerated the procedure well and no immediate complications were noted  I will see the patient back after new MRI resulted  History of the Present Illness   Mariah Baird is a 43 y o  female with Right knee medial and lateral meniscus tears  Today, patient reports exacerbation of Right knee pain after standing up from the couch  She notes the Right leg gave away, fell to the ground, and noted moderate medial knee pain   She had difficulty straightening Right knee after most recent injury and reported to the ED where they advised patient to follow up in office at earliest available appointment  She still admits to moderate pain and decreased range of motion  The swelling of Right knee has been consistent since injury  She does admit her Right knee has always been stiff s/p previous ACL reconstruction when she was 16years old  Review of Systems     Review of Systems   Constitutional: Negative for chills and fever  HENT: Negative for ear pain and sore throat  Eyes: Negative for pain and visual disturbance  Respiratory: Negative for cough and shortness of breath  Cardiovascular: Negative for chest pain and palpitations  Gastrointestinal: Negative for abdominal pain and vomiting  Genitourinary: Negative for dysuria and hematuria  Musculoskeletal: Negative for arthralgias and back pain  Skin: Negative for color change and rash  Neurological: Negative for seizures and syncope  All other systems reviewed and are negative  Physical Exam     /81   Pulse 62   Ht 5' 7" (1 702 m)   BMI 29 76 kg/m²     Right Knee  Range of motion from 10 to 100 with moderate pain  There is mild effusion  There is tenderness over the medial joint line  The patient is ableperform a straight leg raise  Lachman test with minimal to mild anterior translation of proximal tibia compared to contralateral side  Anterior drawer test negative   Varus stress testing reveals no pain or laxity at 0 and 30 degrees   Valgus stress testing reveals no pain or laxity at 0 and 30 degrees  The patient is neurovascular intact distally  Data Review     I have personally reviewed pertinent films in PACS, and my interpretation follows  Previous MRI once again reviewed displaying longitudinal tear posterior horn medial meniscus with horizontal tear lateral meniscus, previous ACL graft intact    Degenerative changes noted in the patellofemoral handles medial compartments      Social History     Tobacco Use    Smoking status: Never Smoker    Smokeless tobacco: Never Used   Vaping Use    Vaping Use: Never used   Substance Use Topics    Alcohol use: Yes     Comment: rarely     Drug use: No           Large joint arthrocentesis: R knee  Universal Protocol:  Consent given by: patient  Patient identity confirmed: verbally with patient    Supporting Documentation  Indications: joint swelling and pain   Procedure Details  Location: knee - R knee  Needle size: 18 G  Approach: lateral    Aspirate amount: 21 mL  Aspirate: clear, yellow and bloody    Patient tolerance: patient tolerated the procedure well with no immediate complications  Dressing:  Sterile dressing applied            Yash Kil, DO

## 2022-06-20 ENCOUNTER — HOSPITAL ENCOUNTER (OUTPATIENT)
Dept: MRI IMAGING | Facility: HOSPITAL | Age: 42
Discharge: HOME | End: 2022-06-20
Payer: COMMERCIAL

## 2022-06-20 DIAGNOSIS — M23.91 ACUTE INTERNAL DERANGEMENT OF RIGHT KNEE: ICD-10-CM

## 2022-06-20 PROCEDURE — G1004 CDSM NDSC: HCPCS

## 2022-06-20 PROCEDURE — 73721 MRI JNT OF LWR EXTRE W/O DYE: CPT

## 2022-06-23 ENCOUNTER — OFFICE VISIT (OUTPATIENT)
Dept: OBGYN CLINIC | Facility: CLINIC | Age: 42
End: 2022-06-23
Payer: COMMERCIAL

## 2022-06-23 ENCOUNTER — HOSPITAL ENCOUNTER (OUTPATIENT)
Dept: RADIOLOGY | Facility: HOSPITAL | Age: 42
Discharge: HOME | End: 2022-06-23
Payer: COMMERCIAL

## 2022-06-23 ENCOUNTER — APPOINTMENT (OUTPATIENT)
Dept: LAB | Facility: HOSPITAL | Age: 42
End: 2022-06-23
Payer: COMMERCIAL

## 2022-06-23 ENCOUNTER — OFFICE VISIT (OUTPATIENT)
Dept: LAB | Facility: HOSPITAL | Age: 42
End: 2022-06-23
Payer: COMMERCIAL

## 2022-06-23 VITALS
WEIGHT: 190 LBS | SYSTOLIC BLOOD PRESSURE: 169 MMHG | BODY MASS INDEX: 29.82 KG/M2 | HEART RATE: 67 BPM | HEIGHT: 67 IN | DIASTOLIC BLOOD PRESSURE: 100 MMHG

## 2022-06-23 DIAGNOSIS — S83.211D BUCKET-HANDLE TEAR OF MEDIAL MENISCUS OF RIGHT KNEE AS CURRENT INJURY, SUBSEQUENT ENCOUNTER: ICD-10-CM

## 2022-06-23 DIAGNOSIS — S83.211D BUCKET-HANDLE TEAR OF MEDIAL MENISCUS OF RIGHT KNEE AS CURRENT INJURY, SUBSEQUENT ENCOUNTER: Primary | ICD-10-CM

## 2022-06-23 DIAGNOSIS — M25.561 ACUTE PAIN OF RIGHT KNEE: ICD-10-CM

## 2022-06-23 DIAGNOSIS — M23.91 ACUTE INTERNAL DERANGEMENT OF RIGHT KNEE: ICD-10-CM

## 2022-06-23 LAB
ALBUMIN SERPL BCP-MCNC: 3.9 G/DL (ref 3.5–5)
ALP SERPL-CCNC: 63 U/L (ref 46–116)
ALT SERPL W P-5'-P-CCNC: 57 U/L (ref 12–78)
ANION GAP SERPL CALCULATED.3IONS-SCNC: 9 MMOL/L (ref 4–13)
AST SERPL W P-5'-P-CCNC: 21 U/L (ref 5–45)
BASOPHILS # BLD AUTO: 0.04 THOUSANDS/ΜL (ref 0–0.1)
BASOPHILS NFR BLD AUTO: 1 % (ref 0–1)
BILIRUB SERPL-MCNC: 0.32 MG/DL (ref 0.2–1)
BUN SERPL-MCNC: 18 MG/DL (ref 5–25)
CALCIUM SERPL-MCNC: 9.2 MG/DL (ref 8.3–10.1)
CHLORIDE SERPL-SCNC: 103 MMOL/L (ref 100–108)
CO2 SERPL-SCNC: 27 MMOL/L (ref 21–32)
CREAT SERPL-MCNC: 0.94 MG/DL (ref 0.6–1.3)
EOSINOPHIL # BLD AUTO: 0.11 THOUSAND/ΜL (ref 0–0.61)
EOSINOPHIL NFR BLD AUTO: 2 % (ref 0–6)
ERYTHROCYTE [DISTWIDTH] IN BLOOD BY AUTOMATED COUNT: 11.9 % (ref 11.6–15.1)
GFR SERPL CREATININE-BSD FRML MDRD: 75 ML/MIN/1.73SQ M
GLUCOSE SERPL-MCNC: 89 MG/DL (ref 65–140)
HCT VFR BLD AUTO: 41 % (ref 34.8–46.1)
HGB BLD-MCNC: 13.9 G/DL (ref 11.5–15.4)
IMM GRANULOCYTES # BLD AUTO: 0.01 THOUSAND/UL (ref 0–0.2)
IMM GRANULOCYTES NFR BLD AUTO: 0 % (ref 0–2)
LYMPHOCYTES # BLD AUTO: 1.78 THOUSANDS/ΜL (ref 0.6–4.47)
LYMPHOCYTES NFR BLD AUTO: 25 % (ref 14–44)
MCH RBC QN AUTO: 29.5 PG (ref 26.8–34.3)
MCHC RBC AUTO-ENTMCNC: 33.9 G/DL (ref 31.4–37.4)
MCV RBC AUTO: 87 FL (ref 82–98)
MONOCYTES # BLD AUTO: 0.54 THOUSAND/ΜL (ref 0.17–1.22)
MONOCYTES NFR BLD AUTO: 8 % (ref 4–12)
NEUTROPHILS # BLD AUTO: 4.69 THOUSANDS/ΜL (ref 1.85–7.62)
NEUTS SEG NFR BLD AUTO: 64 % (ref 43–75)
NRBC BLD AUTO-RTO: 0 /100 WBCS
PLATELET # BLD AUTO: 219 THOUSANDS/UL (ref 149–390)
PMV BLD AUTO: 11.2 FL (ref 8.9–12.7)
POTASSIUM SERPL-SCNC: 3.7 MMOL/L (ref 3.5–5.3)
PROT SERPL-MCNC: 7 G/DL (ref 6.4–8.2)
RBC # BLD AUTO: 4.71 MILLION/UL (ref 3.81–5.12)
SODIUM SERPL-SCNC: 139 MMOL/L (ref 136–145)
WBC # BLD AUTO: 7.17 THOUSAND/UL (ref 4.31–10.16)

## 2022-06-23 PROCEDURE — 99214 OFFICE O/P EST MOD 30 MIN: CPT | Performed by: ORTHOPAEDIC SURGERY

## 2022-06-23 PROCEDURE — 80053 COMPREHEN METABOLIC PANEL: CPT

## 2022-06-23 PROCEDURE — 71046 X-RAY EXAM CHEST 2 VIEWS: CPT

## 2022-06-23 PROCEDURE — 93005 ELECTROCARDIOGRAM TRACING: CPT

## 2022-06-23 PROCEDURE — 85025 COMPLETE CBC W/AUTO DIFF WBC: CPT

## 2022-06-23 PROCEDURE — 36415 COLL VENOUS BLD VENIPUNCTURE: CPT

## 2022-06-23 RX ORDER — CHLORHEXIDINE GLUCONATE 0.12 MG/ML
15 RINSE ORAL ONCE
Status: CANCELLED | OUTPATIENT
Start: 2022-06-23 | End: 2022-06-23

## 2022-06-23 NOTE — PROGRESS NOTES
Patient Name:  Yana Ambrocio  MRN:  [id number]    [address] [place], [place] [address]way     1  Bucket-handle tear of medial meniscus of right knee as current injury, subsequent encounter      43 y o  female with Right knee bucket handle tear medial meniscus  MRI reviewed in office today with patient and  present upon exam  In light of patients MRI results, persistent locking of Right knee and associated swelling and pain, recommended surgical intervention with Right knee arthroscopic partial medial menisectomy vs possible repair  Advised patient would evaluate cartilage of Right knee during arthroscopy which would determine possible meniscus repair vs menisectomy in addition to quality of meniscus tissue quality  In depth conversation had with patient and patients  about intraoperative findings, post op immobilization, outpatient PT, return to function for both menisectomy and meniscus repair  Advised patient and  in the future if knee pain persists, x-ray evidence of progressive osteoarthritis, would discuss Right total knee arthroplasty  Advised patient and  would not perform total knee arthroplasty at this time, x-ray evidence of well maintained joint spaces with evidence of early osteoarthritis from previous injury  With menisectomy, can have progression of osteoarthritis  Verbalized understanding of the above and wished to proceed  Risks of surgical intervention discussed with patient including stiffness, progression of osteoarthritis, continued pain, swelling, DVT, PE, infection, nerve and blood vessel injury, need for subsequent surgery and anesthesia complication  Detailed consent was obtained in the office and patient will go to OR with Dr Kady Lamar on 07/01/2022    History of the Present Illness   Yana Ambrocio is a 43 y o  female with Right knee internal derangement  Today, patient reports continued difficulty with range of motion and ambulating   She admits to continue swelling and locking of Right knee  Patient is still using crutches for ambulation  Review of Systems     Review of Systems   Constitutional: Negative for chills and fever  HENT: Negative for ear pain and sore throat  Eyes: Negative for pain and visual disturbance  Respiratory: Negative for cough and shortness of breath  Cardiovascular: Negative for chest pain and palpitations  Gastrointestinal: Negative for abdominal pain and vomiting  Genitourinary: Negative for dysuria and hematuria  Musculoskeletal: Negative for arthralgias and back pain  Skin: Negative for color change and rash  Neurological: Negative for seizures and syncope  All other systems reviewed and are negative  Physical Exam     /100   Pulse 67   Ht 5' 7" (1 702 m)   Wt 86 2 kg (190 lb)   BMI 29 76 kg/m²     Right Knee  Range of motion from 5 to 120 with pain at terminal range of motion  There is trace effusion  There is tenderness over the medial joint line  The patient is able to perform a straight leg raise  Varus stress testing reveals no pain or laxity at 0 and 30 degrees   Valgus stress testing reveals no pain or laxity at 0 and 30 degrees  The patient is neurovascular intact distally  Data Review     I have personally reviewed pertinent films in PACS, and my interpretation follows  MRI of Right knee demonstrates bucket-handle tear of medial meniscus which is flipped into intracondylar notch  Tricompartmental degenerative changes      Social History     Tobacco Use    Smoking status: Never Smoker    Smokeless tobacco: Never Used   Vaping Use    Vaping Use: Never used   Substance Use Topics    Alcohol use: Yes     Comment: rarely     Drug use: No           Procedures  None     aPt Ham PA-C

## 2022-06-23 NOTE — H&P (VIEW-ONLY)
Patient Name:  Niya Valencia  MRN:  [id number]    Maninder Avendaño  Bucket-handle tear of medial meniscus of right knee as current injury, subsequent encounter      43 y o  female with Right knee bucket handle tear medial meniscus  MRI reviewed in office today with patient and  present upon exam  In light of patients MRI results, persistent locking of Right knee and associated swelling and pain, recommended surgical intervention with Right knee arthroscopic partial medial menisectomy vs possible repair  Advised patient would evaluate cartilage of Right knee during arthroscopy which would determine possible meniscus repair vs menisectomy in addition to quality of meniscus tissue quality  In depth conversation had with patient and patients  about intraoperative findings, post op immobilization, outpatient PT, return to function for both menisectomy and meniscus repair  Advised patient and  in the future if knee pain persists, x-ray evidence of progressive osteoarthritis, would discuss Right total knee arthroplasty  Advised patient and  would not perform total knee arthroplasty at this time, x-ray evidence of well maintained joint spaces with evidence of early osteoarthritis from previous injury  With menisectomy, can have progression of osteoarthritis  Verbalized understanding of the above and wished to proceed  Risks of surgical intervention discussed with patient including stiffness, progression of osteoarthritis, continued pain, swelling, DVT, PE, infection, nerve and blood vessel injury, need for subsequent surgery and anesthesia complication  Detailed consent was obtained in the office and patient will go to OR with Dr Emma Carranza on 07/01/2022    History of the Present Illness   Niya Valencia is a 43 y o  female with Right knee internal derangement  Today, patient reports continued difficulty with range of motion and ambulating   She admits to continue swelling and locking of Right knee  Patient is still using crutches for ambulation  Review of Systems     Review of Systems   Constitutional: Negative for chills and fever  HENT: Negative for ear pain and sore throat  Eyes: Negative for pain and visual disturbance  Respiratory: Negative for cough and shortness of breath  Cardiovascular: Negative for chest pain and palpitations  Gastrointestinal: Negative for abdominal pain and vomiting  Genitourinary: Negative for dysuria and hematuria  Musculoskeletal: Negative for arthralgias and back pain  Skin: Negative for color change and rash  Neurological: Negative for seizures and syncope  All other systems reviewed and are negative  Physical Exam     /100   Pulse 67   Ht 5' 7" (1 702 m)   Wt 86 2 kg (190 lb)   BMI 29 76 kg/m²     Right Knee  Range of motion from 5 to 120 with pain at terminal range of motion  There is trace effusion  There is tenderness over the medial joint line  The patient is able to perform a straight leg raise  Varus stress testing reveals no pain or laxity at 0 and 30 degrees   Valgus stress testing reveals no pain or laxity at 0 and 30 degrees  The patient is neurovascular intact distally  Data Review     I have personally reviewed pertinent films in PACS, and my interpretation follows  MRI of Right knee demonstrates bucket-handle tear of medial meniscus which is flipped into intracondylar notch  Tricompartmental degenerative changes      Social History     Tobacco Use    Smoking status: Never Smoker    Smokeless tobacco: Never Used   Vaping Use    Vaping Use: Never used   Substance Use Topics    Alcohol use: Yes     Comment: rarely     Drug use: No           Procedures  None     Jen Vega PA-C

## 2022-06-24 LAB
ATRIAL RATE: 57 BPM
P AXIS: 58 DEGREES
PR INTERVAL: 164 MS
QRS AXIS: 1 DEGREES
QRSD INTERVAL: 92 MS
QT INTERVAL: 402 MS
QTC INTERVAL: 391 MS
T WAVE AXIS: 34 DEGREES
VENTRICULAR RATE: 57 BPM

## 2022-06-24 PROCEDURE — 93010 ELECTROCARDIOGRAM REPORT: CPT | Performed by: INTERNAL MEDICINE

## 2022-06-27 ENCOUNTER — ANESTHESIA EVENT (OUTPATIENT)
Dept: PERIOP | Facility: HOSPITAL | Age: 42
End: 2022-06-27
Payer: COMMERCIAL

## 2022-06-27 ENCOUNTER — OFFICE VISIT (OUTPATIENT)
Dept: FAMILY MEDICINE CLINIC | Facility: CLINIC | Age: 42
End: 2022-06-27
Payer: COMMERCIAL

## 2022-06-27 VITALS
OXYGEN SATURATION: 97 % | TEMPERATURE: 98.3 F | WEIGHT: 188 LBS | BODY MASS INDEX: 29.51 KG/M2 | SYSTOLIC BLOOD PRESSURE: 146 MMHG | DIASTOLIC BLOOD PRESSURE: 90 MMHG | HEART RATE: 79 BPM | HEIGHT: 67 IN

## 2022-06-27 DIAGNOSIS — M23.91 INTERNAL DERANGEMENT OF RIGHT KNEE: ICD-10-CM

## 2022-06-27 DIAGNOSIS — Z01.818 PREOPERATIVE EXAMINATION: Primary | ICD-10-CM

## 2022-06-27 DIAGNOSIS — R03.0 ELEVATED BLOOD PRESSURE READING: ICD-10-CM

## 2022-06-27 PROCEDURE — 3008F BODY MASS INDEX DOCD: CPT | Performed by: FAMILY MEDICINE

## 2022-06-27 PROCEDURE — 3725F SCREEN DEPRESSION PERFORMED: CPT | Performed by: FAMILY MEDICINE

## 2022-06-27 PROCEDURE — 99244 OFF/OP CNSLTJ NEW/EST MOD 40: CPT | Performed by: FAMILY MEDICINE

## 2022-06-27 RX ORDER — MULTIVIT-MIN/IRON FUM/FOLIC AC 7.5 MG-4
1 TABLET ORAL DAILY
COMMUNITY

## 2022-06-27 NOTE — LETTER
June 27, 2022     Flynn Nicole, DO  [address]  [place] [address]    Patient: Ciro Blair   YOB: 1980   Date of Visit: 6/27/2022       Dear Dr Farhan Watson:    Thank you for referring Ciro Blair to me for evaluation  Below are my notes for this consultation  If you have questions, please do not hesitate to call me  I look forward to following your patient along with you  Patient is cleared for the proposed surgery  Sincerely,        Jaziel Connolly MD        CC: No Recipients  Jaziel Connolly MD  6/27/2022 10:14 AM  Sign when Signing Visit    Depression Screening and Follow-up Plan: Patient was screened for depression during today's encounter  They screened negative with a PHQ-2 score of 0  Assessment/Plan:    Patient is cleared for the proposed surgery     Problem List Items Addressed This Visit        Musculoskeletal and Integument    Internal derangement of right knee       Other    Preoperative examination - Primary    Elevated blood pressure reading     This a recent development and would recommend following this at home  Not high enough to hold surgery  Subjective:      Patient ID: Ciro Blair is a 43 y o  female  Patient comes in for preoperative clearance for repair of meniscal tear of right knee  She has noted mildly elevated blood pressure readings her last few doctor visits  She is otherwise active and good health  The following portions of the patient's history were reviewed and updated as appropriate:   Past Medical History:  She has a past medical history of Migraine and Varicella  ,  _______________________________________________________________________  Medical Problems:  does not have any pertinent problems on file ,  _______________________________________________________________________  Past Surgical History:   has a past surgical history that includes Anterior cruciate ligament repair and Vaginal delivery  ,  _______________________________________________________________________  Family History:  family history includes Arthritis in her maternal grandmother; Asthma in her maternal grandmother; Cancer in her father and paternal grandmother; Depression in her paternal grandmother; Diabetes in her maternal grandmother; Early death in her maternal aunt; Heart block in her father and paternal grandfather; Heart disease in her maternal aunt, maternal grandfather, maternal grandmother, and paternal grandfather; Hypertension in her father, maternal grandfather, maternal grandmother, other, paternal grandfather, and paternal grandmother; No Known Problems in her daughter and sister  ,  _______________________________________________________________________  Social History:   reports that she has never smoked  She has never used smokeless tobacco  She reports current alcohol use  She reports that she does not use drugs  ,  _______________________________________________________________________  Allergies:  is allergic to pollen extract     _______________________________________________________________________  Current Outpatient Medications   Medication Sig Dispense Refill    ibuprofen (MOTRIN) 600 mg tablet Take by mouth every 6 (six) hours as needed for mild pain       No current facility-administered medications for this visit      _______________________________________________________________________  Review of Systems   Constitutional: Negative  HENT: Negative  Respiratory: Negative  Cardiovascular: Negative  Gastrointestinal: Negative  Endocrine: Negative  Allergic/Immunologic: Negative  Neurological: Negative  Objective:  Vitals:    06/27/22 0934   BP: 146/90   Pulse: 79   Temp: 98 3 °F (36 8 °C)   SpO2: 97%   Weight: 85 3 kg (188 lb)   Height: 5' 7" (1 702 m)     Body mass index is 29 44 kg/m²  Physical Exam  Constitutional:       Appearance: Normal appearance   She is well-developed  HENT:      Head: Normocephalic and atraumatic  Right Ear: Tympanic membrane, ear canal and external ear normal       Left Ear: Tympanic membrane, ear canal and external ear normal       Nose: Nose normal       Mouth/Throat:      Mouth: Mucous membranes are moist    Eyes:      Pupils: Pupils are equal, round, and reactive to light  Neck:      Thyroid: No thyromegaly  Cardiovascular:      Rate and Rhythm: Normal rate and regular rhythm  Heart sounds: Normal heart sounds  Pulmonary:      Effort: Pulmonary effort is normal       Breath sounds: Normal breath sounds  Abdominal:      General: Bowel sounds are normal       Palpations: Abdomen is soft  There is no mass  Tenderness: There is no abdominal tenderness  Musculoskeletal:      Cervical back: Neck supple  Lymphadenopathy:      Cervical: No cervical adenopathy  Skin:     General: Skin is warm and dry  Neurological:      Mental Status: She is alert  Psychiatric:         Mood and Affect: Mood normal          Behavior: Behavior normal          Thought Content:  Thought content normal

## 2022-06-27 NOTE — ASSESSMENT & PLAN NOTE
This a recent development and would recommend following this at home  Not high enough to hold surgery

## 2022-06-27 NOTE — PROGRESS NOTES
Depression Screening and Follow-up Plan: Patient was screened for depression during today's encounter  They screened negative with a PHQ-2 score of 0  Assessment/Plan:    Patient is cleared for the proposed surgery     Problem List Items Addressed This Visit        Musculoskeletal and Integument    Internal derangement of right knee       Other    Preoperative examination - Primary    Elevated blood pressure reading     This a recent development and would recommend following this at home  Not high enough to hold surgery  Subjective:      Patient ID: Marv Da Silva is a 43 y o  female  Patient comes in for preoperative clearance for repair of meniscal tear of right knee  She has noted mildly elevated blood pressure readings her last few doctor visits  She is otherwise active and good health  The following portions of the patient's history were reviewed and updated as appropriate:   Past Medical History:  She has a past medical history of Migraine and Varicella  ,  _______________________________________________________________________  Medical Problems:  does not have any pertinent problems on file ,  _______________________________________________________________________  Past Surgical History:   has a past surgical history that includes Anterior cruciate ligament repair and Vaginal delivery  ,  _______________________________________________________________________  Family History:  family history includes Arthritis in her maternal grandmother; Asthma in her maternal grandmother; Cancer in her father and paternal grandmother; Depression in her paternal grandmother; Diabetes in her maternal grandmother; Early death in her maternal aunt;  Heart block in her father and paternal grandfather; Heart disease in her maternal aunt, maternal grandfather, maternal grandmother, and paternal grandfather; Hypertension in her father, maternal grandfather, maternal grandmother, other, paternal grandfather, and paternal grandmother; No Known Problems in her daughter and sister  ,  _______________________________________________________________________  Social History:   reports that she has never smoked  She has never used smokeless tobacco  She reports current alcohol use  She reports that she does not use drugs  ,  _______________________________________________________________________  Allergies:  is allergic to pollen extract     _______________________________________________________________________  Current Outpatient Medications   Medication Sig Dispense Refill    ibuprofen (MOTRIN) 600 mg tablet Take by mouth every 6 (six) hours as needed for mild pain       No current facility-administered medications for this visit      _______________________________________________________________________  Review of Systems   Constitutional: Negative  HENT: Negative  Respiratory: Negative  Cardiovascular: Negative  Gastrointestinal: Negative  Endocrine: Negative  Allergic/Immunologic: Negative  Neurological: Negative  Objective:  Vitals:    06/27/22 0934   BP: 146/90   Pulse: 79   Temp: 98 3 °F (36 8 °C)   SpO2: 97%   Weight: 85 3 kg (188 lb)   Height: 5' 7" (1 702 m)     Body mass index is 29 44 kg/m²  Physical Exam  Constitutional:       Appearance: Normal appearance  She is well-developed  HENT:      Head: Normocephalic and atraumatic  Right Ear: Tympanic membrane, ear canal and external ear normal       Left Ear: Tympanic membrane, ear canal and external ear normal       Nose: Nose normal       Mouth/Throat:      Mouth: Mucous membranes are moist    Eyes:      Pupils: Pupils are equal, round, and reactive to light  Neck:      Thyroid: No thyromegaly  Cardiovascular:      Rate and Rhythm: Normal rate and regular rhythm  Heart sounds: Normal heart sounds  Pulmonary:      Effort: Pulmonary effort is normal       Breath sounds: Normal breath sounds     Abdominal: General: Bowel sounds are normal       Palpations: Abdomen is soft  There is no mass  Tenderness: There is no abdominal tenderness  Musculoskeletal:      Cervical back: Neck supple  Lymphadenopathy:      Cervical: No cervical adenopathy  Skin:     General: Skin is warm and dry  Neurological:      Mental Status: She is alert  Psychiatric:         Mood and Affect: Mood normal          Behavior: Behavior normal          Thought Content:  Thought content normal

## 2022-07-01 ENCOUNTER — HOSPITAL ENCOUNTER (OUTPATIENT)
Facility: HOSPITAL | Age: 42
Setting detail: OUTPATIENT SURGERY
Discharge: HOME | End: 2022-07-01
Attending: ORTHOPAEDIC SURGERY | Admitting: ORTHOPAEDIC SURGERY
Payer: COMMERCIAL

## 2022-07-01 ENCOUNTER — ANESTHESIA (OUTPATIENT)
Dept: PERIOP | Facility: HOSPITAL | Age: 42
End: 2022-07-01
Payer: COMMERCIAL

## 2022-07-01 ENCOUNTER — TELEPHONE (OUTPATIENT)
Dept: OBGYN CLINIC | Facility: HOSPITAL | Age: 42
End: 2022-07-01

## 2022-07-01 VITALS
BODY MASS INDEX: 29.41 KG/M2 | SYSTOLIC BLOOD PRESSURE: 138 MMHG | DIASTOLIC BLOOD PRESSURE: 88 MMHG | HEART RATE: 68 BPM | OXYGEN SATURATION: 98 % | TEMPERATURE: 98.2 F | WEIGHT: 187.39 LBS | RESPIRATION RATE: 22 BRPM | HEIGHT: 67 IN

## 2022-07-01 DIAGNOSIS — S83.211D BUCKET-HANDLE TEAR OF MEDIAL MENISCUS OF RIGHT KNEE AS CURRENT INJURY, SUBSEQUENT ENCOUNTER: Primary | ICD-10-CM

## 2022-07-01 LAB
EXT PREGNANCY TEST URINE: NEGATIVE
EXT. CONTROL: NORMAL

## 2022-07-01 PROCEDURE — 29880 ARTHRS KNE SRG MNISECTMY M&L: CPT | Performed by: PHYSICIAN ASSISTANT

## 2022-07-01 PROCEDURE — 81025 URINE PREGNANCY TEST: CPT | Performed by: ORTHOPAEDIC SURGERY

## 2022-07-01 PROCEDURE — 29880 ARTHRS KNE SRG MNISECTMY M&L: CPT | Performed by: ORTHOPAEDIC SURGERY

## 2022-07-01 RX ORDER — CEFAZOLIN SODIUM 2 G/50ML
2000 SOLUTION INTRAVENOUS ONCE
Status: COMPLETED | OUTPATIENT
Start: 2022-07-01 | End: 2022-07-01

## 2022-07-01 RX ORDER — ONDANSETRON 2 MG/ML
4 INJECTION INTRAMUSCULAR; INTRAVENOUS EVERY 6 HOURS PRN
Status: DISCONTINUED | OUTPATIENT
Start: 2022-07-01 | End: 2022-07-01 | Stop reason: HOSPADM

## 2022-07-01 RX ORDER — PROPOFOL 10 MG/ML
INJECTION, EMULSION INTRAVENOUS AS NEEDED
Status: DISCONTINUED | OUTPATIENT
Start: 2022-07-01 | End: 2022-07-01

## 2022-07-01 RX ORDER — CHLORHEXIDINE GLUCONATE 0.12 MG/ML
15 RINSE ORAL ONCE
Status: COMPLETED | OUTPATIENT
Start: 2022-07-01 | End: 2022-07-01

## 2022-07-01 RX ORDER — ONDANSETRON 4 MG/1
4 TABLET, ORALLY DISINTEGRATING ORAL EVERY 6 HOURS PRN
Qty: 20 TABLET | Refills: 0 | Status: SHIPPED | OUTPATIENT
Start: 2022-07-01 | End: 2022-08-11

## 2022-07-01 RX ORDER — LIDOCAINE HYDROCHLORIDE 20 MG/ML
INJECTION, SOLUTION EPIDURAL; INFILTRATION; INTRACAUDAL; PERINEURAL AS NEEDED
Status: DISCONTINUED | OUTPATIENT
Start: 2022-07-01 | End: 2022-07-01

## 2022-07-01 RX ORDER — ASPIRIN 81 MG/1
81 TABLET ORAL 2 TIMES DAILY
Qty: 28 TABLET | Refills: 0 | Status: SHIPPED | OUTPATIENT
Start: 2022-07-01 | End: 2022-08-11

## 2022-07-01 RX ORDER — METOCLOPRAMIDE HYDROCHLORIDE 5 MG/ML
10 INJECTION INTRAMUSCULAR; INTRAVENOUS ONCE AS NEEDED
Status: DISCONTINUED | OUTPATIENT
Start: 2022-07-01 | End: 2022-07-01 | Stop reason: HOSPADM

## 2022-07-01 RX ORDER — SODIUM CHLORIDE, SODIUM LACTATE, POTASSIUM CHLORIDE, AND CALCIUM CHLORIDE .6; .31; .03; .02 G/100ML; G/100ML; G/100ML; G/100ML
IRRIGANT IRRIGATION AS NEEDED
Status: DISCONTINUED | OUTPATIENT
Start: 2022-07-01 | End: 2022-07-01 | Stop reason: HOSPADM

## 2022-07-01 RX ORDER — ONDANSETRON 2 MG/ML
INJECTION INTRAMUSCULAR; INTRAVENOUS AS NEEDED
Status: DISCONTINUED | OUTPATIENT
Start: 2022-07-01 | End: 2022-07-01

## 2022-07-01 RX ORDER — FENTANYL CITRATE/PF 50 MCG/ML
50 SYRINGE (ML) INJECTION
Status: COMPLETED | OUTPATIENT
Start: 2022-07-01 | End: 2022-07-01

## 2022-07-01 RX ORDER — DEXMEDETOMIDINE HYDROCHLORIDE 100 UG/ML
INJECTION, SOLUTION INTRAVENOUS AS NEEDED
Status: DISCONTINUED | OUTPATIENT
Start: 2022-07-01 | End: 2022-07-01

## 2022-07-01 RX ORDER — DEXAMETHASONE SODIUM PHOSPHATE 10 MG/ML
INJECTION, SOLUTION INTRAMUSCULAR; INTRAVENOUS AS NEEDED
Status: DISCONTINUED | OUTPATIENT
Start: 2022-07-01 | End: 2022-07-01

## 2022-07-01 RX ORDER — EPHEDRINE SULFATE 50 MG/ML
INJECTION INTRAVENOUS AS NEEDED
Status: DISCONTINUED | OUTPATIENT
Start: 2022-07-01 | End: 2022-07-01

## 2022-07-01 RX ORDER — OXYCODONE HYDROCHLORIDE AND ACETAMINOPHEN 5; 325 MG/1; MG/1
1 TABLET ORAL ONCE
Status: COMPLETED | OUTPATIENT
Start: 2022-07-01 | End: 2022-07-01

## 2022-07-01 RX ORDER — MIDAZOLAM HYDROCHLORIDE 2 MG/2ML
INJECTION, SOLUTION INTRAMUSCULAR; INTRAVENOUS AS NEEDED
Status: DISCONTINUED | OUTPATIENT
Start: 2022-07-01 | End: 2022-07-01

## 2022-07-01 RX ORDER — HYDROMORPHONE HCL/PF 1 MG/ML
0.5 SYRINGE (ML) INJECTION
Status: DISCONTINUED | OUTPATIENT
Start: 2022-07-01 | End: 2022-07-01 | Stop reason: HOSPADM

## 2022-07-01 RX ORDER — PROMETHAZINE HYDROCHLORIDE 25 MG/ML
12.5 INJECTION, SOLUTION INTRAMUSCULAR; INTRAVENOUS ONCE AS NEEDED
Status: DISCONTINUED | OUTPATIENT
Start: 2022-07-01 | End: 2022-07-01 | Stop reason: HOSPADM

## 2022-07-01 RX ORDER — KETOROLAC TROMETHAMINE 30 MG/ML
INJECTION, SOLUTION INTRAMUSCULAR; INTRAVENOUS AS NEEDED
Status: DISCONTINUED | OUTPATIENT
Start: 2022-07-01 | End: 2022-07-01

## 2022-07-01 RX ORDER — SODIUM CHLORIDE, SODIUM LACTATE, POTASSIUM CHLORIDE, CALCIUM CHLORIDE 600; 310; 30; 20 MG/100ML; MG/100ML; MG/100ML; MG/100ML
125 INJECTION, SOLUTION INTRAVENOUS CONTINUOUS
Status: DISCONTINUED | OUTPATIENT
Start: 2022-07-01 | End: 2022-07-01 | Stop reason: HOSPADM

## 2022-07-01 RX ORDER — FENTANYL CITRATE/PF 50 MCG/ML
50 SYRINGE (ML) INJECTION
Status: DISCONTINUED | OUTPATIENT
Start: 2022-07-01 | End: 2022-07-01 | Stop reason: HOSPADM

## 2022-07-01 RX ORDER — FENTANYL CITRATE 50 UG/ML
INJECTION, SOLUTION INTRAMUSCULAR; INTRAVENOUS AS NEEDED
Status: DISCONTINUED | OUTPATIENT
Start: 2022-07-01 | End: 2022-07-01

## 2022-07-01 RX ORDER — OXYCODONE HYDROCHLORIDE AND ACETAMINOPHEN 5; 325 MG/1; MG/1
1 TABLET ORAL EVERY 4 HOURS PRN
Qty: 20 TABLET | Refills: 0 | Status: SHIPPED | OUTPATIENT
Start: 2022-07-01 | End: 2022-08-11

## 2022-07-01 RX ORDER — IBUPROFEN 400 MG/1
800 TABLET ORAL ONCE
Status: DISCONTINUED | OUTPATIENT
Start: 2022-07-01 | End: 2022-07-01 | Stop reason: HOSPADM

## 2022-07-01 RX ORDER — DIPHENHYDRAMINE HYDROCHLORIDE 50 MG/ML
12.5 INJECTION INTRAMUSCULAR; INTRAVENOUS ONCE AS NEEDED
Status: DISCONTINUED | OUTPATIENT
Start: 2022-07-01 | End: 2022-07-01 | Stop reason: HOSPADM

## 2022-07-01 RX ORDER — BUPIVACAINE HYDROCHLORIDE 5 MG/ML
INJECTION, SOLUTION EPIDURAL; INTRACAUDAL AS NEEDED
Status: DISCONTINUED | OUTPATIENT
Start: 2022-07-01 | End: 2022-07-01 | Stop reason: HOSPADM

## 2022-07-01 RX ADMIN — CHLORHEXIDINE GLUCONATE 0.12% ORAL RINSE 15 ML: 1.2 LIQUID ORAL at 07:08

## 2022-07-01 RX ADMIN — KETOROLAC TROMETHAMINE 30 MG: 30 INJECTION, SOLUTION INTRAMUSCULAR at 08:59

## 2022-07-01 RX ADMIN — ONDANSETRON 4 MG: 2 INJECTION INTRAMUSCULAR; INTRAVENOUS at 08:57

## 2022-07-01 RX ADMIN — SODIUM CHLORIDE, SODIUM LACTATE, POTASSIUM CHLORIDE, AND CALCIUM CHLORIDE 125 ML/HR: .6; .31; .03; .02 INJECTION, SOLUTION INTRAVENOUS at 07:08

## 2022-07-01 RX ADMIN — LIDOCAINE HYDROCHLORIDE 100 MG: 20 INJECTION, SOLUTION EPIDURAL; INFILTRATION; INTRACAUDAL; PERINEURAL at 07:48

## 2022-07-01 RX ADMIN — FENTANYL CITRATE 50 MCG: 50 INJECTION INTRAMUSCULAR; INTRAVENOUS at 09:18

## 2022-07-01 RX ADMIN — PROPOFOL 300 MG: 10 INJECTION, EMULSION INTRAVENOUS at 07:49

## 2022-07-01 RX ADMIN — DEXMEDETOMIDINE HCL 4 MCG: 100 INJECTION INTRAVENOUS at 08:56

## 2022-07-01 RX ADMIN — FENTANYL CITRATE 100 MCG: 50 INJECTION INTRAMUSCULAR; INTRAVENOUS at 07:53

## 2022-07-01 RX ADMIN — DEXAMETHASONE SODIUM PHOSPHATE 10 MG: 10 INJECTION, SOLUTION INTRAMUSCULAR; INTRAVENOUS at 07:53

## 2022-07-01 RX ADMIN — DEXMEDETOMIDINE HCL 4 MCG: 100 INJECTION INTRAVENOUS at 08:47

## 2022-07-01 RX ADMIN — MIDAZOLAM 2 MG: 1 INJECTION INTRAMUSCULAR; INTRAVENOUS at 07:45

## 2022-07-01 RX ADMIN — EPHEDRINE SULFATE 10 MG: 50 INJECTION, SOLUTION INTRAVENOUS at 08:01

## 2022-07-01 RX ADMIN — OXYCODONE HYDROCHLORIDE AND ACETAMINOPHEN 1 TABLET: 5; 325 TABLET ORAL at 09:56

## 2022-07-01 RX ADMIN — FENTANYL CITRATE 50 MCG: 50 INJECTION INTRAMUSCULAR; INTRAVENOUS at 09:27

## 2022-07-01 RX ADMIN — EPHEDRINE SULFATE 15 MG: 50 INJECTION, SOLUTION INTRAVENOUS at 08:04

## 2022-07-01 RX ADMIN — CEFAZOLIN SODIUM 2000 MG: 2 SOLUTION INTRAVENOUS at 07:47

## 2022-07-01 NOTE — OP NOTE
OPERATIVE REPORT  PATIENT NAME: Jermaine Monk    :  1980  MRN: [id number]  Pt Location: [facility] OR ROOM 04    SURGERY DATE: 2022    Surgeon(s) and Role:     * Bettie Judd,  - Primary     * Jamilah Morales PA-C - Assisting    Preop Diagnosis:  Bucket-handle tear of medial meniscus of right knee as current injury, subsequent encounter [S83 211D]  Tricompartmental osteoarthritis    Post-Op Diagnosis Codes:     * Bucket-handle tear of medial meniscus of right knee as current injury, subsequent encounter [S83 211D]  Horizontal tear lateral meniscus   Loose body  Tricompartmental osteoarthritis    Procedure:  Right knee arthroscopy, subtotal medial meniscectomy, partial lateral meniscectomy, loose body removal, chondroplasty medial femoral condyle patella    Specimen(s):  * No specimens in log *    Estimated Blood Loss:   Minimal    Drains:  * No LDAs found *    Anesthesia Type:   General with 20 cc 0 5% Marcaine local anesthetic    Operative Indications:  Bucket-handle tear of medial meniscus of right knee as current injury, subsequent encounter [S83 211D]  Mechanical block to range of motion secondary to displaced meniscal tear with persistent pain and difficulty weight-bearing  Loose body right knee      Operative Findings:  Right knee bucket-handle tear medial meniscus with displacement into the intercondylar notch, horizontal tear lateral meniscus, loose body, tricompartmental degenerative changes, intact ACL graft with mild attenuation    Complications:   None    Procedure and Technique:    Antibiotics:  2 g Ancef     Implants:  None    Tourniquet time:  63 minutes    The patient was seen in the preoperative holding area and the appropriate site of surgery was confirmed and marked  Upon bringing the patient back to the operating room she was placed supine on the operating room table  A tourniquet was placed on the thigh of the operative leg and the leg was placed in the appropriate leg bill  The well leg was placed on a well-padded pillow  Exam under anesthesia revealed grade 1 +pivot shift and subtle increased translation with Lachman maneuver when compared to contralateral side  Esmarch was used to exsanguinate the leg and tourniquet was inflated to 250 mmHg  The right lower extremity was prepped and draped in the usual sterile fashion  An appropriate preoperative time-out was performed to once again confirm the site of surgery and procedure  A lateral incision was made for an arthroscopic viewing portal and the arthroscopic camera was inserted  A diagnostic arthroscopy was performed and displayed diffuse grade 2 and 3 degenerative change present through patella and trochlear groove, most notable over central and medial patellar facet  A medial arthroscopic portal was made under direct visualization with the aid of an 18 gauge spinal needle  An arthroscopic probe was inserted and the remainder of the diagnostic arthroscopy was performed which further showed a displaced bucket-handle medial meniscus tear with displacement into the anterior joint space and intercondylar notch  Tear was reducible but diffuse grade 2 change was noted over the tibial plateau with grade 2 and 3 change over the medial femoral condyle small area of grade 4 change anteriorly  Upon inspection of the ACL graft it was noted to be intact with mild attenuation  Inspection of lateral compartment revealed a horizontal tear of the posterior horn extending into the posterior body of lateral meniscus with areas of grade 2 change the lateral femoral condyle  Attention was turned back to medial meniscus tear  Due to degenerative changes diffusely throughout the knee, decision was made to perform partial meniscectomy  An arthroscopic shaver was inserted to debride loose ends of the medial meniscal tear  18 gauge spinal needle was used to pie crust the MCL to allow better access to the posterior meniscus tear    Arthroscopic biter was also used to further remove the displaced bucket-handle portion and contour anterior and posteriorly  This was further debrided with an arthroscopic shaver once again  A loose body was noted under the posterior horn of the medial meniscus upon debridement  An arthroscopic grasper was used to remove the loose body which was measured to be 10 x 5 x 5 mm in size  Arthroscopic shaver was used to debride loose chondral flaps on the medial femoral condyle  Arthroscopic biter was used to debride a large delaminated flap anteriorly followed by arthroscopic shaver once again  The arthroscopic probe was once again inserted to ensure stable edges of the meniscus status post meniscectomy  Residual stable anterior body of medial meniscus was left intact as well as small area of posterior root and horn, though the remainder of the posterior horn, body, and posterior aspect of anterior horn were excised  Arthroscopic shaver was then used to debride undersurface tear of lateral meniscus to stable meniscal tissue  Oceana Dom was then used to debride loose chondral flaps on the undersurface of patella  Arthroscopic incisions were closed with 3-0 nylon suture  20 cc of 0 5% Marcaine local anesthetic injected at the operative site  A sterile dressing was applied  The patient tolerated the procedure well no complications were noted  The patient was transferred to the PACU without any issue  I was present for the entire procedure, A qualified resident physician was not available and a physician assistant, Garrett Jimenes PA-C,  was required during the procedure for patient positioning retraction, assistance with the arthroscopic camera and instrumentation due to the minimally invasive nature of the surgical case, and suturing      Patient Disposition:  PACU       SIGNATURE: Gabby Elkins DO  DATE: July 1, 2022  TIME: 9:05 AM

## 2022-07-01 NOTE — ANESTHESIA PREPROCEDURE EVALUATION
Procedure:  ARTHROSCOPY KNEE- Right Knee partial medial menisectomy vs meniscus repair, all associated procedures (Right Knee)    Relevant Problems   No relevant active problems        Physical Exam    Airway    Mallampati score: I  TM Distance: >3 FB  Neck ROM: full     Dental   No notable dental hx     Cardiovascular      Pulmonary      Other Findings        Anesthesia Plan  ASA Score- 2     Anesthesia Type- general with ASA Monitors  Additional Monitors:   Airway Plan: LMA  Plan Factors-Exercise tolerance (METS): >4 METS  Chart reviewed  EKG reviewed  Existing labs reviewed  Patient summary reviewed  Patient is not a current smoker  Induction- intravenous  Postoperative Plan- Plan for postoperative opioid use  Informed Consent- Anesthetic plan and risks discussed with patient  I personally reviewed this patient with the CRNA  Discussed and agreed on the Anesthesia Plan with the CRNA  French Rodriguez

## 2022-07-01 NOTE — TELEPHONE ENCOUNTER
To assist patient, reviewed AVS--- called   We reviewed the following  Loosen the ace wrap around your knee if it becomes too tight or painful  Remove all dressings 3 days after surgery  If there is still some wound seepage, apply a fresh STERILE gauze over the  incisions and secure with tape or an ace wrap  We discussed keeping the compression on for swelling control for 3 days as dressings are to remain in place for that time also

## 2022-07-01 NOTE — INTERVAL H&P NOTE
H&P reviewed  After examining the patient I find no changes in the patients condition since the H&P had been written  Patient was seen and evaluated in office where continued conservative versus surgical management of right knee medial meniscus tear was discussed in depth patient  In light of patient's MRI evidence of bucket handle medial meniscus tear, continued pain, swelling, and locking symptoms, patient would like to proceed forward with surgical intervention  Risks and benefits were discussed in office as displayed in previous note and detailed consent was obtained and signed in office  Patient verbalized understanding the above and will go to OR with Dr Steven Pringle on 07/01/2022 for Right knee arthroscopic partial medial meniscectomy versus possible repair, all associated procedures      Fourteen point review of systems performed in office  Musculoskeletal:  Right knee pain    Heart:  Regular rate rhythm  Lungs:  Clear to auscultation bilaterally    Vitals:    07/01/22 0659   BP: (!) 179/94   Pulse: 71   Resp: 19   Temp: (!) 97 2 °F (36 2 °C)   SpO2: 97%

## 2022-07-01 NOTE — ANESTHESIA POSTPROCEDURE EVALUATION
Post-Op Assessment Note    CV Status:  Stable  Pain Score: 1    Pain management: adequate     Mental Status:  Alert and awake   Hydration Status:  Euvolemic   PONV Controlled:  Controlled   Airway Patency:  Patent      Post Op Vitals Reviewed: Yes      Staff: CRNA         No complications documented      BP  132/81   Temp 98   Pulse 72   Resp 18   SpO2 100

## 2022-07-01 NOTE — TELEPHONE ENCOUNTER
Patient's  calling to ask whether or not embolism stocking is part of dressing and should not be removed or if that can be taken off  Please advise      CB # [phone]

## 2022-07-01 NOTE — DISCHARGE INSTRUCTIONS
Knee Surgery:  Postoperative Instructions  Dr Scott Connolly may resume your regular diet as soon as possible    Medication    Take the pain medication as prescribed   Take pain medication with food   While taking pain medications, you may NOT operate a vehicle, heavy machinery, or appliances   While taking pain medication, you may NOT drink alcoholic beverages   If you have any reactions to your medications, stop taking them and call my office   If you are not allergic, take one aspirin 81mg twice a day to help prevent blood clots   Please keep in mind that constipation is a very common side effect of taking narcotic pain medication  Take precautions to prevent constipation:  o Drink plenty of water (6-8 glasses of 8 oz  a day)  o Avoid alcohol, caffeine, and dairy products  o Eat plenty of fiber (fruits, vegetables, and whole grains)  o Take an over the counter stool softener (Colace or Dulcolax)    Activity    RANGE OF MOTION   _____ You may bend your knee as much as the dressings will allow     WEIGHT BEARING   _____ You may weight bear as tolerated     You may practice quadriceps muscle tightening and straight leg raises several times every hour  Please continue to move your ankle up and down and tighten and relax your calf muscles several times every hour to help reduce swelling and prevent blood clots  You may use your crutches for balance as needed until your first post operative visit  The optimal position of the leg after surgery is for you to be lying flat with your ankle higher than your knee and higher than your heart, in an effort to reduce swelling  It is important to continuously elevate your knee above your heart until your swelling is completely down  Please keep ice applied to the knee for at least the first 72 hours or as long as pain or swelling persists  Do not apply ice directly to skin, or allow water to leak on your dressing      Showering    You may shower 4 days after surgery unless told otherwise  DO NOT immerse the knee under water and DO NOT rub the incision  Pad the incisions dry and place new Band-Aids over the sutures after showering  If you have a brace, you may remove it to shower  Keep your knee straight in the shower  You may sponge bathe for the first 3 daYS, taking care to keep the dressing clean and dry  Dressing Care    Keep the dressing clean and dry  It is normal to get some bloody wound seepage through the bandage  DO NOT BE ALARMED  If the dressing gets soaked with wound seepage, please reinforce with a dry sterile dressing  Loosen the ace wrap around your knee if it becomes too tight or painful  Remove all dressings 3  days after surgery  If there is still some wound seepage, apply a fresh STERILE gauze over the incisions and secure with tape or an ace wrap  DO NOT TOUCH OR REMOVE THE SUTURES!! Emergency/Follow-Up   Please notify my office at [phone] if you develop any fever (101 deg or above), unexpected warmth, redness or swelling  Please call if your toes become cold, purple, numb, or there is excessive bleeding  Please call the office within 24 hours at [phone] to schedule a follow up appt within 7-10 days from surgery

## 2022-07-11 ENCOUNTER — OFFICE VISIT (OUTPATIENT)
Dept: OBGYN CLINIC | Facility: CLINIC | Age: 42
End: 2022-07-11

## 2022-07-11 VITALS
HEART RATE: 73 BPM | RESPIRATION RATE: 18 BRPM | SYSTOLIC BLOOD PRESSURE: 144 MMHG | WEIGHT: 189 LBS | HEIGHT: 67 IN | BODY MASS INDEX: 29.66 KG/M2 | DIASTOLIC BLOOD PRESSURE: 86 MMHG

## 2022-07-11 DIAGNOSIS — Z98.890 S/P RIGHT KNEE ARTHROSCOPY: Primary | ICD-10-CM

## 2022-07-11 PROCEDURE — 99024 POSTOP FOLLOW-UP VISIT: CPT | Performed by: ORTHOPAEDIC SURGERY

## 2022-07-11 NOTE — PROGRESS NOTES
Patient Name:  Nichole Newell  MRN:  [id number]    [address] [place], [place] [address]     1  S/P right knee arthroscopy      63-year-old female 10 days s/p right knee arthroscopy, subtotal medial meniscectomy, partial lateral meniscectomy, loose body removal, chondroplasty medial femoral condyle patella performed 7/1/2022, progressing well  Surgical pictures reviewed with the patient today  Portal sites healing nicely  Sutures were removed and Steri-Strips applied in the office today  Patient tolerated this procedure well with no immediate complications  Steri-Strips should remain in place until they fall off on their own  Do not scrub or submerge the incision for 1 more week  Okay to let clean soapy water run over the incision in the shower  She may wean from the crutch as tolerated  She is also to begin physical therapy as scheduled  She can work on some gentle quad strengthening and ROM at home as well  No deep knee flexon or pivoting  Continue Aspirin 81mg for 1 more week for DVT prophylaxis  I will plan to see her back in 4-5 weeks for repeat clinical evaluation  History of the Present Illness   Nichole Newell is a 43 y o  female 10 days s/p right knee arthroscopy, subtotal medial meniscectomy, partial lateral meniscectomy, loose body removal, chondroplasty medial femoral condyle patella performed 7/1/2022  Patient is doing well overall, pain has been minimal  She has been utilizing ibuprofen when needed  Denies numbness, tingling, fevers and chills  She is still using a single crutch for balance but is putting some weight on the right lower extremity  She has been taking baby aspirin for DVT prophylaxis  She does have an upcoming physical therapy evaluation scheduled  Review of Systems     Review of Systems   Constitutional: Negative for appetite change and unexpected weight change  HENT: Negative for congestion and trouble swallowing  Eyes: Negative for visual disturbance     Respiratory: Negative for cough and shortness of breath  Cardiovascular: Negative for chest pain and palpitations  Gastrointestinal: Negative for nausea and vomiting  Endocrine: Negative for cold intolerance and heat intolerance  Musculoskeletal: Negative for gait problem and myalgias  Skin: Negative for rash  Neurological: Negative for numbness  Physical Exam     /86   Pulse 73   Resp 18   Ht 5' 7" (1 702 m)   Wt 85 7 kg (189 lb)   BMI 29 60 kg/m²     Right Knee  Portal sites clean, dry and intact  No erythema or warmth to palpation  No fluctuance or active drainage  Resolving ecchymosis medially  Range of motion from 5 to 110  There is trace effusion  The patient is able to perform a straight leg raise  Calf nontender and compressible  The patient is neurovascular intact distally  Data Review     No new imaging to review today  Social History     Tobacco Use    Smoking status: Never Smoker    Smokeless tobacco: Never Used   Vaping Use    Vaping Use: Never used   Substance Use Topics    Alcohol use: Yes     Comment: rarely     Drug use: No     No procedures performed today      Scribe Attestation    I,:  Pal Cao am acting as a scribe while in the presence of the attending physician :       I,:  Bernabe Roque DO personally performed the services described in this documentation    as scribed in my presence :

## 2022-07-13 ENCOUNTER — TELEPHONE (OUTPATIENT)
Dept: OBGYN CLINIC | Facility: CLINIC | Age: 42
End: 2022-07-13

## 2022-07-13 NOTE — TELEPHONE ENCOUNTER
Patient called to have PT Order faxed to [facility] in [place]   faxe to: [address] [phone]

## 2022-07-18 ENCOUNTER — TELEPHONE (OUTPATIENT)
Dept: OBGYN CLINIC | Facility: HOSPITAL | Age: 42
End: 2022-07-18

## 2022-07-18 NOTE — TELEPHONE ENCOUNTER
[facility] is calling to obtain protocol to follow for patient   Can be faxed to [phone]    [facility] # [phone]

## 2022-08-11 ENCOUNTER — OFFICE VISIT (OUTPATIENT)
Dept: OBGYN CLINIC | Facility: CLINIC | Age: 42
End: 2022-08-11

## 2022-08-11 VITALS
BODY MASS INDEX: 29.73 KG/M2 | HEART RATE: 60 BPM | WEIGHT: 189.4 LBS | HEIGHT: 67 IN | SYSTOLIC BLOOD PRESSURE: 140 MMHG | DIASTOLIC BLOOD PRESSURE: 84 MMHG

## 2022-08-11 DIAGNOSIS — Z98.890 S/P RIGHT KNEE ARTHROSCOPY: Primary | ICD-10-CM

## 2022-08-11 PROCEDURE — 99024 POSTOP FOLLOW-UP VISIT: CPT | Performed by: ORTHOPAEDIC SURGERY

## 2022-08-11 NOTE — PROGRESS NOTES
Patient Name:  Kellee Jhaveri  MRN:  [id number]    [address] [place] [address]  S/P right knee arthroscopy        43y o  year old female approximately 5 weeks and 6 days status post right knee arthroscopy, subtotal medial meniscectomy, partial lateral meniscectomy, loose body removal, chondroplasty medial femoral condyle patella performed 7/1/2022  There has been great improvement with the patients progress through physical therapy  It was discussed that she work on her quad strengthening  I have decided to continue to prescribe physical therapy in an effort to decrease pain, improve strength, and improve flexibility  I will see the patient back in 6 weeks for re-evaluation  History of the Present Illness   Kellee Jhaveri is a 43 y o  female approximately 5 weeks and 6 days status post right knee arthroscopy, subtotal medial meniscectomy, partial lateral meniscectomy, loose body removal, chondroplasty medial femoral condyle patella performed 7/1/2022  Patient is progressing well  Her only complaint is discomfort with tightness going down steps  She notes it is tight in the anterior knee  She also notes there is sensitivity at the medial knee  She reports her knee is feeling much better compared to before surgery  Review of Systems     Review of Systems   Constitutional: Negative for appetite change and unexpected weight change  HENT: Negative for congestion and trouble swallowing  Eyes: Negative for visual disturbance  Respiratory: Negative for cough and shortness of breath  Cardiovascular: Negative for chest pain and palpitations  Gastrointestinal: Negative for nausea and vomiting  Endocrine: Negative for cold intolerance and heat intolerance  Musculoskeletal: Negative for joint swelling and myalgias  Skin: Negative for rash  Neurological: Negative for numbness         Physical Exam     /84   Pulse 60   Ht 5' 7" (1 702 m)   Wt 85 9 kg (189 lb 6 4 oz)   BMI 29 66 kg/m² Right Knee  Range of motion from 2 to 130  There is mild crepitus with range of motion  There is mild effusion  There is tenderness over the medial joint line and distal MCL  There is 4+/5 quadriceps strength and decreased tone compared to the contralateral     The patient is able to perform a straight leg raise  Varus stress testing reveals no instability at 0 and 30 degrees   Valgus stress testing reveals no instability at 0 and 30 degrees  The patient is neurovascular intact distally  Skin is intact  No erythema  The incision is clean, dry and intact  Data Review     I have personally reviewed pertinent films in PACS, and my interpretation follows  None performed  Social History     Tobacco Use    Smoking status: Never Smoker    Smokeless tobacco: Never Used   Vaping Use    Vaping Use: Never used   Substance Use Topics    Alcohol use: Yes     Comment: rarely     Drug use: No           Procedures  None performed       Jayme Rm     Scribe Attestation    I,:  Jayme Rm am acting as a scribe while in the presence of the attending physician :       I,:  Amos Bella DO personally performed the services described in this documentation    as scribed in my presence :

## 2022-09-22 ENCOUNTER — OFFICE VISIT (OUTPATIENT)
Dept: OBGYN CLINIC | Facility: CLINIC | Age: 42
End: 2022-09-22

## 2022-09-22 VITALS
SYSTOLIC BLOOD PRESSURE: 136 MMHG | DIASTOLIC BLOOD PRESSURE: 87 MMHG | WEIGHT: 189 LBS | HEART RATE: 69 BPM | BODY MASS INDEX: 29.66 KG/M2 | HEIGHT: 67 IN

## 2022-09-22 DIAGNOSIS — Z98.890 S/P RIGHT KNEE ARTHROSCOPY: Primary | ICD-10-CM

## 2022-09-22 PROCEDURE — 99024 POSTOP FOLLOW-UP VISIT: CPT | Performed by: ORTHOPAEDIC SURGERY

## 2022-09-22 NOTE — PROGRESS NOTES
Patient Name:  Remo Nyhan  MRN:  [id number]    [address] [place] [address]     1  S/P right knee arthroscopy      43 y o  female approximately 12 week s/p Right knee arthroscopic partial medial and lateral menisectomy, chondroplasty medial femoral condyle, patella, loose body removal performed on 07/01/2022  Overall, patient continues to improve s/p surgical intervention and initating new activities  Placed order today for ACL brace as she was fitted at prior appointment  Advised patient she may wear brace during new activities including hiking, running with her children, walking, skiing  Advised patient to ease into new activities with frequent breaks as needed as to not cause fatigue/injury  Patient verbalized understanding of the above and will follow up in office on an as needed basis  History of the Present Illness   Remo Nyhan is a 43 y o  female approximately 12 week s/p Right knee arthroscopic partial medial and lateral menisectomy, chondroplasty medial femoral condyle, patella, loose body removal performed on 07/01/2022  Today, patient reports she is doing very well s/p surgical intervention  She would like to start to integrate increased activity, especially winter sports  She is worried about twisting and turning and is concerned she is going to injury her Right knee again  She is interested in ACL brace as she was measured for the brace prior to surgical intervention  Review of Systems     Review of Systems   Constitutional: Negative for chills and fever  HENT: Negative for ear pain and sore throat  Eyes: Negative for pain and visual disturbance  Respiratory: Negative for cough and shortness of breath  Cardiovascular: Negative for chest pain and palpitations  Gastrointestinal: Negative for abdominal pain and vomiting  Genitourinary: Negative for dysuria and hematuria  Musculoskeletal: Negative for arthralgias and back pain  Skin: Negative for color change and rash  Neurological: Negative for seizures and syncope  All other systems reviewed and are negative  Physical Exam     /87   Pulse 69   Ht 5' 7" (1 702 m)   Wt 85 7 kg (189 lb)   BMI 29 60 kg/m²     Right Knee  Surgical incisions well healing, without signs of infection  Range of motion from 0 to 130 with mild crepitus  There is trace effusion  There is minimal residual tenderness over the medial joint line  The patient is able to perform a straight leg raise with 4+/5 quad strength and decreased tone compared to contralateral extremity    Varus stress testing reveals no instability at 0 and 30 degrees   Valgus stress testing reveals no instbaility at 0 and 30 degrees  Negative Lachman test, minimal to mild anterior translation of proximal tibia without gross instability  Calf soft, compressible and nontender  The patient is neurovascular intact distally  Data Review     I have personally reviewed pertinent films in PACS, and my interpretation follows      No new images    Social History     Tobacco Use    Smoking status: Never Smoker    Smokeless tobacco: Never Used   Vaping Use    Vaping Use: Never used   Substance Use Topics    Alcohol use: Yes     Comment: rarely     Drug use: No           Procedures  None     Chay Toney PA-C

## 2023-01-19 PROBLEM — Z01.419 ENCOUNTER FOR GYNECOLOGICAL EXAMINATION WITHOUT ABNORMAL FINDING: Status: RESOLVED | Noted: 2021-11-29 | Resolved: 2023-01-19

## 2023-01-19 PROBLEM — Z12.31 ENCOUNTER FOR SCREENING MAMMOGRAM FOR MALIGNANT NEOPLASM OF BREAST: Status: RESOLVED | Noted: 2022-01-19 | Resolved: 2023-01-19

## 2023-01-19 NOTE — PROGRESS NOTES
Diagnoses and all orders for this visit:    Encounter for gynecological examination without abnormal finding    Encounter for screening mammogram for malignant neoplasm of breast  -     Mammo screening bilateral w 3d & cad; Future        Perineal hygiene reviewed, recommended applying witch hazel to pubic area after shaving to help prevent ingrown hairs  Recommended changing soaps to Dove sensitive bar soap to perineal area     Weight bearing exercises minium of 150 mins/weekly advised  Kegel exercises recommended  SBE encouraged, ASCCP guidelines reviewed  Condoms encouraged with all sexual activity to prevent STI's  Gardisil vaccines recommended up to age 39  Calcium/ Vit D dietary requirements discussed,   Advised to call with any issues,  all concerns & questions addressed  See provided information in your after visit summary     F/U Annually and PRN      Health Maintenance:    Last PAP: 01/19/2022 Neg/Neg  Next PAP Due: 2025    Last Mammogram: 01/25/2022    Life time Re Olivera % 10 16, Density B scattered areas of fibroglandular density , Bi-Rads 1 Negative  Next Mammogram: order given    Last Colonoscopy: Not on file    advised age 39    Gardisil: Not completed       Subjective    CC: Yearly Exam      Desiree Hess is a 37 y o  female here for an annual exam  [id number]  GYN hx includes: 4 vaginal deliveries    No personal Hx of breast, cervical, ovarian or colon CA  Family hx of:  No GYN cancers  Medically stable, reports no changes in medical Hx, follows with PMD, recent Knee surgery for meniscus tear    Patient's last menstrual period was 01/01/2023  Her menstrual cycles are regular every 28-30 days  She denies issues with bleeding during her menses  Amount of flow has been changing, getting shorter and lighter   Denies history of abnormal pap smear    She denies breast concerns, abnormal vaginal discharge, vaginal itching, odor, irritation, bowel/bladder dysfunction, urinary symptoms, pelvic pain, or dyspareunia today  She is sexually active  Monogamous relationship  Her current method of contraception includes  male vasectomy   Denies any issues with her BCM  [facility] She does not want STD testing today    Denies intimate partner violence    3 boys, 1 girl  6,8,9,11  Was a , stays 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     Past Medical History:   Diagnosis Date   • Migraine    • Varicella      Past Surgical History:   Procedure Laterality Date   • ANTERIOR CRUCIATE LIGAMENT REPAIR Right    • [place] ARTHRS KNE SURG W/MENISCECTOMY MED/LAT W/SHVG Right 7/1/2022    Procedure: ARTHROSCOPY KNEE- Right Knee Subtotal medial partial lateral meniscectomy, chondroplasty medial femoral condyle and patella, loose body medial ;  Surgeon: Ben Jacobson DO;  Location: [facility];  Service: Orthopedics   • VAGINAL DELIVERY      x4       Immunization History   Administered Date(s) Administered   • COVID-19 PFIZER VACCINE 0 3 ML IM 08/13/2021, 09/03/2021   • INFLUENZA 12/01/2015   • Influenza Injectable, MDCK, Preservative Free, Quadrivalent, 0 5 mL 11/05/2019   • Influenza, injectable, quadrivalent, preservative free 0 5 mL 10/26/2020   • Influenza, seasonal, injectable, preservative free 12/01/2015   • MMR 07/10/2016   • Tdap 03/24/2016       Family History   Problem Relation Age of Onset   • Cancer Father         urinary bladder - unspecified site   • Hypertension Father    • Heart block Father    • Early death Maternal Aunt    • Heart disease Maternal Aunt    • Diabetes Maternal Grandmother         type 2 - diet controlled   • Arthritis Maternal Grandmother    • Asthma Maternal Grandmother    • Heart disease Maternal Grandmother    • Hypertension Maternal Grandmother    • Heart disease Maternal Grandfather    • Hypertension Maternal Grandfather    • Cancer Paternal Grandmother         urinary bladder - unspecified site   • Depression Paternal Grandmother    • Hypertension Paternal Grandmother    • Hypertension Paternal Grandfather    • Heart block Paternal Grandfather    • Heart disease Paternal Grandfather         ill-defined   • Hypertension Other    • No Known Problems Sister    • No Known Problems Daughter      Social History     Tobacco Use   • Smoking status: Never   • Smokeless tobacco: Never   Vaping Use   • Vaping Use: Never used   Substance Use Topics   • Alcohol use: Yes     Comment: rarely    • Drug use: No       Current Outpatient Medications:   •  Multiple Vitamins-Minerals (multivitamin with minerals) tablet, Take 1 tablet by mouth daily, Disp: , Rfl:   Patient Active Problem List    Diagnosis Date Noted   • Preoperative examination 2022   • Elevated blood pressure reading 2022   • Internal derangement of right knee 2022   • Breast nodule 2022   • Carpal tunnel syndrome of right wrist 2020   • Triceps tendinitis 2019   • Post-viral cough syndrome 2018       Allergies   Allergen Reactions   • Pollen Extract Itching       OB History    Para Term  AB Living   4 4 4 0 0 4   SAB IAB Ectopic Multiple Live Births   0 0 0 0 4      # Outcome Date GA Lbr Christopher/2nd Weight Sex Delivery Anes PTL Lv   4 Term 16 41w1d / 00:02 3725 g (8 lb 3 4 oz) F Vag-Spont EPI N MADI      Birth Comments: head circumference per SBAR of charting on Baby Girl [place]   3 Term 10/10/14    M Vag-Spont   MADI   2 Term 07/15/13    M    MADI   1 Term 10/01/11    M    MADI      Obstetric Comments   Menarche: 15years old  Vitals:    23 1448   BP: 124/80   BP Location: Left arm   Patient Position: Sitting   Cuff Size: Large   Weight: 87 5 kg (193 lb)   Height: 5' 7" (1 702 m)     Body mass index is 30 23 kg/m²  Review of Systems     Constitutional: Negative for chills, fatigue, fever, headaches, visual disturbances, and unexpected weight change  Respiratory: Negative for cough, & shortness of breath  Cardiovascular: Negative for chest pain  Naoma Mood Gastrointestinal: Negative for Abd pain, nausea & vomiting, constipation and diarrhea  Genitourinary: Negative for difficulty urinating, dysuria, hematuria, dyspareunia, unusual vaginal bleeding or discharge  Skin: Negative skin changes    Physical Exam     Constitutional: Alert & Oriented x3, well-developed and well-nourished  No distress  HENT: Atraumatic, Normocephalic, Conjunctivae clear  Neck: Normal range of motion  Neck supple  No thyromegaly, mass, nodules or tenderness  Pulmonary: Effort normal    Abdominal: Soft  No tenderness or masses  Musculoskeletal: Normal ROM  Skin: Warm & Dry  Psychological: Normal mood, thought content, behavior & judgement     Breasts:   Right: tissue soft without masses, tenderness, skin changes or nipple discharge  No areas of erythema or pain  No subclavicular, axillary, pectoral adenopathy  Left:  tissue soft without masses, tenderness, skin changes or nipple discharge  No areas of erythema or pain  No subclavicular, axillary, pectoral adenopathy    Pelvic exam was performed with patient supine, lithotomy position  Labia: Negative rash, tenderness, lesion or injury on the right labia  Negative rash, tenderness, lesion or injury on the left labia  Urethral meatus:  Negative for  tenderness, inflammation or discharge  Uterus: not deviated, enlarged, fixed or tender  Cervix: No CMT, no discharge or friability  Right adnexa: no mass, no tenderness and no fullness  Left adnexa: no mass, no tenderness and no fullness  Vagina: No erythema, tenderness, masses, or foreign body in the vagina  No signs of injury around the vagina  No unusual vaginal discharge   Perineum without lesions, signs of injury, erythema or swelling  Inguinal Canal:        Right: No inguinal adenopathy or hernia present  Left: No inguinal adenopathy or hernia present  2021

## 2023-01-19 NOTE — PATIENT INSTRUCTIONS
Breast Self Exam for Women   AMBULATORY CARE:   A breast self-exam (BSE)  is a way to check your breasts for lumps and other changes  Regular BSEs can help you know how your breasts normally look and feel  Most breast lumps or changes are not cancer, but you should always have them checked by a healthcare provider  Why you should do a BSE:  Breast cancer is the most common type of cancer in women  Even if you have mammograms, you may still want to do a BSE regularly  If you know how your breasts normally feel and look, it may help you know when to contact your healthcare provider  Mammograms can miss some cancers  You may find a lump during a BSE that did not show up on a mammogram   When you should do a BSE:  If you have periods, you may want to do your BSE 1 week after your period ends  This is the time when your breasts may be the least swollen, lumpy, or tender  You can do regular BSEs even if you are breastfeeding or have breast implants  Call your doctor if:   You find any lumps or changes in your breasts  You have breast pain or fluid coming from your nipples  You have questions or concerns about your condition or care  How to do a BSE:       Look at your breasts in a mirror  Look at the size and shape of each breast and nipple  Check for swelling, lumps, dimpling, scaly skin, or other skin changes  Look for nipple changes, such as a nipple that is painful or beginning to pull inward  Gently squeeze both nipples and check to see if fluid (that is not breast milk) comes out of them  If you find any of these or other breast changes, contact your healthcare provider  Check your breasts while you sit or  the following 3 positions:    Ty Ty your arms down at your sides  Raise your hands and join them behind your head  Put firm pressure with your hands on your hips  Bend slightly forward while you look at your breasts in the mirror  Lie down and feel your breasts    When you lie down, your breast tissue spreads out evenly over your chest  This makes it easier for you to feel for lumps and anything that may not be normal for your breasts  Do a BSE on one breast at a time  Place a small pillow or towel under your left shoulder  Put your left arm behind your head  Use the 3 middle fingers of your right hand  Use your fingertip pads, on the top of your fingers  Your fingertip pad is the most sensitive part of your finger  Use small circles to feel your breast tissue  Use your fingertip pads to make dime-sized, overlapping circles on your breast and armpits  Use light, medium, and firm pressure  First, press lightly  Second, press with medium pressure to feel a little deeper into the breast  Last, use firm pressure to feel deep within your breast     Examine your entire breast area  Examine the breast area from above the breast to below the breast where you feel only ribs  Make small circles with your fingertips, starting in the middle of your armpit  Make circles going up and down the breast area  Continue toward your breast and all the way across it  Examine the area from your armpit all the way over to the middle of your chest (breastbone)  Stop at the middle of your chest     Move the pillow or towel to your right shoulder, and put your right arm behind your head  Use the 3 fingertip pads of your left hand, and repeat the above steps to do a BSE on your right breast     What else you can do to check for breast problems or cancer:  Talk to your healthcare provider about mammograms  A mammogram is an x-ray of your breasts to screen for breast cancer or other problems  Your provider can tell you the benefits and risks of mammograms  The first mammogram is usually at age 39 or 48  Your provider may recommend you start at 36 or younger if your risk for breast cancer is high  Mammograms usually continue every 1 to 2 years until age 76         Follow up with your doctor as directed:  Write down your questions so you remember to ask them during your visits  © Copyright [employer] 2022 Information is for End User's use only and may not be sold, redistributed or otherwise used for commercial purposes  All illustrations and images included in CareNotes® are the copyrighted property of [employer] D A M [employer]  or Donn Hawley  The above information is an  only  It is not intended as medical advice for individual conditions or treatments  Talk to your doctor, nurse or pharmacist before following any medical regimen to see if it is safe and effective for you  Wellness Visit for Adults   AMBULATORY CARE:   A wellness visit  is when you see your healthcare provider to get screened for health problems  Your healthcare provider will also give you advice on how to stay healthy  Write down your questions so you remember to ask them  Ask your healthcare provider how often you should have a wellness visit  What happens at a wellness visit:  Your healthcare provider will ask about your health, and your family history of health problems  This includes high blood pressure, heart disease, and cancer  He or she will ask if you have symptoms that concern you, if you smoke, and about your mood  You may also be asked about your intake of medicines, supplements, food, and alcohol  Any of the following may be done: Your weight  will be checked  Your height may also be checked so your body mass index (BMI) can be calculated  Your BMI shows if you are at a healthy weight  Your blood pressure  and heart rate will be checked  Your temperature may also be checked  Blood and urine tests  may be done  Blood tests may be done to check your cholesterol levels  Abnormal cholesterol levels increase your risk for heart disease and stroke  You may also need a blood or urine test to check for diabetes if you are at increased risk  Urine tests may be done to look for signs of an infection or kidney disease      A physical exam  includes checking your heartbeat and lungs with a stethoscope  Your healthcare provider may also check your skin to look for sun damage  Screening tests  may be recommended  A screening test is done to check for diseases that may not cause symptoms  The screening tests you may need depend on your age, gender, family history, and lifestyle habits  For example, colorectal screening may be recommended if you are 48years old or older  Screening tests you need if you are a woman:   A Pap smear  is used to screen for cervical cancer  Pap smears are usually done every 3 to 5 years depending on your age  You may need them more often if you have had abnormal Pap smear test results in the past  Ask your healthcare provider how often you should have a Pap smear  A mammogram  is an x-ray of your breasts to screen for breast cancer  Experts recommend mammograms every 2 years starting at age 48 years  You may need a mammogram at age 52 years or younger if you have an increased risk for breast cancer  Talk to your healthcare provider about when you should start having mammograms and how often you need them  Vaccines you may need:   Get an influenza vaccine  every year  The influenza vaccine protects you from the flu  Several types of viruses cause the flu  The viruses change over time, so new vaccines are made each year  Get a tetanus-diphtheria (Td) booster vaccine  every 10 years  This vaccine protects you against tetanus and diphtheria  Tetanus is a severe infection that may cause painful muscle spasms and lockjaw  Diphtheria is a severe bacterial infection that causes a thick covering in the back of your mouth and throat  Get a human papillomavirus (HPV) vaccine  if you are female and aged 23 to 32 or male 23 to 24 and never received it  This vaccine protects you from HPV infection  HPV is the most common infection spread by sexual contact   HPV may also cause vaginal, penile, and anal cancers  Get a pneumococcal vaccine  if you are aged 72 years or older  The pneumococcal vaccine is an injection given to protect you from pneumococcal disease  Pneumococcal disease is an infection caused by pneumococcal bacteria  The infection may cause pneumonia, meningitis, or an ear infection  Get a shingles vaccine  if you are 60 or older, even if you have had shingles before  The shingles vaccine is an injection to protect you from the varicella-zoster virus  This is the same virus that causes chickenpox  Shingles is a painful rash that develops in people who had chickenpox or have been exposed to the virus  How to eat healthy:  My Plate is a model for planning healthy meals  It shows the types and amounts of foods that should go on your plate  Fruits and vegetables make up about half of your plate, and grains and protein make up the other half  A serving of dairy is included on the side of your plate  The amount of calories and serving sizes you need depends on your age, gender, weight, and height  Examples of healthy foods are listed below:  Eat a variety of vegetables  such as dark green, red, and orange vegetables  You can also include canned vegetables low in sodium (salt) and frozen vegetables without added butter or sauces  Eat a variety of fresh fruits , canned fruit in 100% juice, frozen fruit, and dried fruit  Include whole grains  At least half of the grains you eat should be whole grains  Examples include whole-wheat bread, wheat pasta, brown rice, and whole-grain cereals such as oatmeal     Eat a variety of protein foods such as seafood (fish and shellfish), lean meat, and poultry without skin (turkey and chicken)  Examples of lean meats include pork leg, shoulder, or tenderloin, and beef round, sirloin, tenderloin, and extra lean ground beef  Other protein foods include eggs and egg substitutes, beans, peas, soy products, nuts, and seeds      Choose low-fat dairy products such as skim or 1% milk or low-fat yogurt, cheese, and cottage cheese  Limit unhealthy fats  such as butter, hard margarine, and shortening  Exercise:  Exercise at least 30 minutes per day on most days of the week  Some examples of exercise include walking, biking, dancing, and swimming  You can also fit in more physical activity by taking the stairs instead of the elevator or parking farther away from stores  Include muscle strengthening activities 2 days each week  Regular exercise provides many health benefits  It helps you manage your weight, and decreases your risk for type 2 diabetes, heart disease, stroke, and high blood pressure  Exercise can also help improve your mood  Ask your healthcare provider about the best exercise plan for you  General health and safety guidelines:   Do not smoke  Nicotine and other chemicals in cigarettes and cigars can cause lung damage  Ask your healthcare provider for information if you currently smoke and need help to quit  E-cigarettes or smokeless tobacco still contain nicotine  Talk to your healthcare provider before you use these products  Limit alcohol  A drink of alcohol is 12 ounces of beer, 5 ounces of wine, or 1½ ounces of liquor  Lose weight, if needed  Being overweight increases your risk of certain health conditions  These include heart disease, high blood pressure, type 2 diabetes, and certain types of cancer  Protect your skin  Do not sunbathe or use tanning beds  Use sunscreen with a SPF 15 or higher  Apply sunscreen at least 15 minutes before you go outside  Reapply sunscreen every 2 hours  Wear protective clothing, hats, and sunglasses when you are outside  Drive safely  Always wear your seatbelt  Make sure everyone in your car wears a seatbelt  A seatbelt can save your life if you are in an accident  Do not use your cell phone when you are driving  This could distract you and cause an accident   Pull over if you need to make a call or send a text message  Practice safe sex  Use latex condoms if are sexually active and have more than one partner  Your healthcare provider may recommend screening tests for sexually transmitted infections (STIs)  Wear helmets, lifejackets, and protective gear  Always wear a helmet when you ride a bike or motorcycle, go skiing, or play sports that could cause a head injury  Wear protective equipment when you play sports  Wear a lifejacket when you are on a boat or doing water sports  © Copyright [employer] 2022 Information is for End User's use only and may not be sold, redistributed or otherwise used for commercial purposes  All illustrations and images included in CareNotes® are the copyrighted property of A D A M , Inc  or [facility]   The above information is an  only  It is not intended as medical advice for individual conditions or treatments  Talk to your doctor, nurse or pharmacist before following any medical regimen to see if it is safe and effective for you  Kegel Exercises for Women   AMBULATORY CARE:   Kegel exercises  help strengthen your pelvic muscles  Pelvic muscles hold your pelvic organs, such as your bladder and uterus, in place  Kegel exercises help prevent or control problems with urine incontinence (leakage)  Incontinence may be caused by pregnancy, childbirth, or menopause  Contact your healthcare provider if:   You cannot feel your muscles tighten or relax  You continue to leak urine  You have questions or concerns about your condition or care  Use the correct muscles:  Pelvic muscles are the muscles you use to control urine flow  To target these muscles, stop and start the flow of urine several times  This will help you become familiar with how it feels to tighten and relax these muscles  How to do Kegel exercises:   Empty your bladder  You may lie down, stand up, or sit down to do these exercises   When you first try to do these exercises, it may be easier if you lie down  Tighten or squeeze your pelvic muscles slowly  It may feel like you are trying to hold back urine or gas  Hold this position for 3 seconds  Relax for 3 seconds  Repeat this cycle 10 times  Do 10 sets of Kegel exercises, at least 3 times a day  Do not hold your breath when you do Kegel exercises  Keep your stomach, back, and leg muscles relaxed  As your muscles get stronger, you will be able to hold the squeeze longer  Your healthcare provider may ask that you increase your pelvic muscle squeeze to 10 seconds  After you squeeze for 10 seconds, relax for 10 seconds  What else you should know:   Once you know how to do Kegel exercises, use different positions  You can do these exercises while you lie on the floor, sit at your desk or watch TV, and while you stand  You may notice improved bladder control within about 6 weeks  Tighten your pelvic muscles before you sneeze, cough, or lift to prevent urine leakage  Follow up with your doctor as directed:  Write down your questions so you remember to ask them during your visits  © Copyright [employer] 2022 Information is for End User's use only and may not be sold, redistributed or otherwise used for commercial purposes  All illustrations and images included in CareNotes® are the copyrighted property of A D A M , Inc  or [facility]  The above information is an  only  It is not intended as medical advice for individual conditions or treatments  Talk to your doctor, nurse or pharmacist before following any medical regimen to see if it is safe and effective for you  Perineal Hygiene      Your vaginal naturally takes care of its self, it is a self washing system, the less you mess the healthier it will be     No soaps or feminine wash to the vulva, these products can cause dermitis, bacterial infections and other vulvar problems     Use only water to cleanse, or water with [place] or [place] Sensitive Skin Bar soap if necessary  No scented lotions or products are advised in or near your vulva  Use only coconut oil for moisture if needed  No douching this may cause imbalance in your vaginal PH and further issues  If you wear panty liners, you may apply a thin coating of Vaseline, A&D ointment or coconut oil to the vulvar tissues as a skin barrier     Cotton underware, loose fitting clothing  Only perfume-free, dye-free laundry detergent, use a second rinse cycle   Avoid fabric softeners/dryer sheets  Partner should avoid the same products as well  Over the counter probiotic to restore vaginal cindy may be helpful as well, take daily  You may also look into Boric Acid vaginal suppositories to restore vaginal PH balance for up to 2 weeks as directed on the box  You may not use these if you are pregnant      For vaginal dryness: You may use:     Coconut oil (organic, pure, unscented) as needed for moisture or lubrication  ( Do not use if allergic)       Replens moisture restore external comfort gel daily ( use as directed on the box)        Replens long lasting vaginal moisturizer  ( use as directed on the box)         For Vaginal Lubrication:          You may use:     Coconut oil (organic, pure, unscented) as a lubricant or another scent-free lubricant (Astroglide, Uberlube) if needed  Do not use coconut oil or silicone if using a condom as this may break down the integrity of the condom and cause an unplanned pregnancy              Do not use coconut oil if allergic               Replens silky smooth lubricant, premium silicone based lubricant for intercourse  ( use as directed, a small amount will provide an enhanced natural feeling)     Any premium over the counter vaginal lubricant water or silicone based  Silicone based will have more staying power

## 2023-01-19 NOTE — PROGRESS NOTES
[id number]  All vaginal deliveries   LMP: 01/01/2023  PMB:  SA: Chucky Benson  Birth control:  Vasectomy   Last pap: 01/19/2022  Negative HPV negative   Last mammo: 01/25/2022:  Last colonoscopy: Not on file  Last Dexa: Not on file  Family History:    Father- urinary  bladder caner   PGM- urinary bladder cancer     Patient has no concerns at today's visit

## 2023-01-20 ENCOUNTER — ANNUAL EXAM (OUTPATIENT)
Dept: OBGYN CLINIC | Facility: CLINIC | Age: 43
End: 2023-01-20

## 2023-01-20 VITALS
DIASTOLIC BLOOD PRESSURE: 80 MMHG | WEIGHT: 193 LBS | HEIGHT: 67 IN | BODY MASS INDEX: 30.29 KG/M2 | SYSTOLIC BLOOD PRESSURE: 124 MMHG

## 2023-01-20 DIAGNOSIS — Z01.419 ENCOUNTER FOR GYNECOLOGICAL EXAMINATION WITHOUT ABNORMAL FINDING: Primary | ICD-10-CM

## 2023-01-20 DIAGNOSIS — Z12.31 ENCOUNTER FOR SCREENING MAMMOGRAM FOR MALIGNANT NEOPLASM OF BREAST: ICD-10-CM

## 2023-03-14 ENCOUNTER — HOSPITAL ENCOUNTER (OUTPATIENT)
Dept: MAMMOGRAPHY | Facility: CLINIC | Age: 43
Discharge: HOME | End: 2023-03-14

## 2023-03-14 DIAGNOSIS — Z12.31 ENCOUNTER FOR SCREENING MAMMOGRAM FOR MALIGNANT NEOPLASM OF BREAST: ICD-10-CM

## 2023-03-31 ENCOUNTER — HOSPITAL ENCOUNTER (OUTPATIENT)
Dept: MAMMOGRAPHY | Facility: CLINIC | Age: 43
End: 2023-03-31

## 2023-03-31 ENCOUNTER — HOSPITAL ENCOUNTER (OUTPATIENT)
Dept: ULTRASOUND IMAGING | Facility: CLINIC | Age: 43
End: 2023-03-31

## 2023-03-31 VITALS — BODY MASS INDEX: 30.29 KG/M2 | WEIGHT: 193 LBS | HEIGHT: 67 IN

## 2023-03-31 DIAGNOSIS — R92.8 ABNORMAL MAMMOGRAM: ICD-10-CM

## 2023-08-16 ENCOUNTER — OFFICE VISIT (OUTPATIENT)
Dept: FAMILY MEDICINE CLINIC | Facility: CLINIC | Age: 43
End: 2023-08-16
Payer: COMMERCIAL

## 2023-08-16 VITALS
HEART RATE: 57 BPM | DIASTOLIC BLOOD PRESSURE: 82 MMHG | WEIGHT: 197 LBS | BODY MASS INDEX: 30.92 KG/M2 | HEIGHT: 67 IN | OXYGEN SATURATION: 98 % | TEMPERATURE: 97.3 F | SYSTOLIC BLOOD PRESSURE: 116 MMHG

## 2023-08-16 DIAGNOSIS — D17.1 LIPOMA OF BUTTOCK: ICD-10-CM

## 2023-08-16 DIAGNOSIS — Z82.49 FAMILY HISTORY OF HEART DISEASE IN MALE FAMILY MEMBER BEFORE AGE 55: ICD-10-CM

## 2023-08-16 DIAGNOSIS — Z13.29 SCREENING FOR THYROID DISORDER: ICD-10-CM

## 2023-08-16 DIAGNOSIS — Z13.6 SCREENING FOR CARDIOVASCULAR CONDITION: ICD-10-CM

## 2023-08-16 DIAGNOSIS — D17.20 LIPOMA OF UPPER EXTREMITY, UNSPECIFIED LATERALITY: Primary | ICD-10-CM

## 2023-08-16 DIAGNOSIS — Z00.00 ANNUAL PHYSICAL EXAM: ICD-10-CM

## 2023-08-16 DIAGNOSIS — Z13.21 ENCOUNTER FOR VITAMIN DEFICIENCY SCREENING: ICD-10-CM

## 2023-08-16 PROBLEM — R05.8 POST-VIRAL COUGH SYNDROME: Status: RESOLVED | Noted: 2018-03-14 | Resolved: 2023-08-16

## 2023-08-16 PROCEDURE — 99396 PREV VISIT EST AGE 40-64: CPT | Performed by: PHYSICIAN ASSISTANT

## 2023-08-16 NOTE — PROGRESS NOTES
[address]    NAME: Eve Tobias  AGE: 37 y.o. SEX: female  : 1980     DATE: 2023     Assessment and Plan:     Problem List Items Addressed This Visit        Other    RESOLVED: Lipoma of upper extremity - Primary    Relevant Orders    Ambulatory Referral to General Surgery   Other Visit Diagnoses     Lipoma of buttock        Relevant Orders    Ambulatory Referral to General Surgery    Annual physical exam        Screening for cardiovascular condition        Relevant Orders    CBC and differential    Comprehensive metabolic panel    Lipid Panel with Direct LDL reflex    Encounter for vitamin deficiency screening        Relevant Orders    Vitamin D 25 hydroxy    Screening for thyroid disorder        Relevant Orders    TSH, 3rd generation    Family history of heart disease in male family member before age 54        Relevant Orders    Ambulatory Referral to Cardiology          Immunizations and preventive care screenings were discussed with patient today. Appropriate education was printed on patient's after visit summary. Counseling:  Dental Health: discussed importance of regular tooth brushing, flossing, and dental visits. Injury prevention: discussed safety/seat belts, safety helmets, smoke detectors, carbon dioxide detectors, and smoking near bedding or upholstery. Exercise: the importance of regular exercise/physical activity was discussed. Recommend exercise 3-5 times per week for at least 30 minutes. No follow-ups on file.      Chief Complaint:     Chief Complaint   Patient presents with   • Mass     Left arm / right thigh (bothering her)   • Follow-up     Would like to discuss heart disease (runs in fathers side of family)       History of Present Illness:     Adult Annual Physical   Patient here for a comprehensive physical exam. The patient reports lipomas that are now bothering her. Pt's father had coronary artery bypass surgery at age 48. Diet and Physical Activity  Diet/Nutrition: well balanced diet. Exercise: walking, moderate cardiovascular exercise and 3-4 times a week on average. Depression Screening  PHQ-2/9 Depression Screening    Little interest or pleasure in doing things: 0 - not at all  Feeling down, depressed, or hopeless: 0 - not at all  PHQ-2 Score: 0  PHQ-2 Interpretation: Negative depression screen       General Health  Sleep: sleeps well. Hearing: normal - bilateral.  Vision: no vision problems and most recent eye exam >1 year ago. Dental: regular dental visits. /GYN Health  Patient is: premenopausal  Last menstrual period: 08/03/2023  Contraceptive method: male sterilization. Review of Systems:     Review of Systems   Constitutional: Negative for appetite change, fatigue, fever and unexpected weight change. HENT: Negative for dental problem, ear pain, hearing loss, mouth sores, nosebleeds, rhinorrhea, tinnitus, trouble swallowing and voice change. Eyes: Negative for photophobia, pain, discharge and visual disturbance. Respiratory: Negative for cough, chest tightness, shortness of breath and wheezing. Cardiovascular: Negative for chest pain and palpitations. Gastrointestinal: Negative for abdominal pain, blood in stool, constipation, diarrhea, nausea, rectal pain and vomiting. Endocrine: Negative for cold intolerance, polydipsia, polyphagia and polyuria. Genitourinary: Negative for decreased urine volume, difficulty urinating, dysuria, enuresis, frequency, genital sores, hematuria and urgency. Musculoskeletal: Negative for arthralgias, back pain, gait problem, joint swelling, myalgias, neck pain and neck stiffness. Skin: Negative for color change and rash. Allergic/Immunologic: Negative for environmental allergies, food allergies and immunocompromised state.    Neurological: Negative for dizziness, seizures, speech difficulty, light-headedness and headaches. Hematological: Negative for adenopathy. Does not bruise/bleed easily. Psychiatric/Behavioral: Negative for behavioral problems, confusion, decreased concentration, self-injury and sleep disturbance. The patient is not nervous/anxious and is not hyperactive.        Past Medical History:     Past Medical History:   Diagnosis Date   • Migraine    • Varicella       Past Surgical History:     Past Surgical History:   Procedure Laterality Date   • ANTERIOR CRUCIATE LIGAMENT REPAIR Right    • [place] ARTHRS KNE SURG W/MENISCECTOMY MED/LAT W/SHVG Right 7/1/2022    Procedure: ARTHROSCOPY KNEE- Right Knee Subtotal medial partial lateral meniscectomy, chondroplasty medial femoral condyle and patella, loose body medial.;  Surgeon: Andrew Freitas DO;  Location: [facility] OR;  Service: Orthopedics   • VAGINAL DELIVERY      x4      Social History:     Social History     Socioeconomic History   • Marital status: /Civil Union     Spouse name: None   • Number of children: None   • Years of education: None   • Highest education level: None   Occupational History   • None   Tobacco Use   • Smoking status: Never   • Smokeless tobacco: Never   Vaping Use   • Vaping Use: Never used   Substance and Sexual Activity   • Alcohol use: Yes     Comment: rarely    • Drug use: No   • Sexual activity: Yes     Partners: Male     Birth control/protection: Male Sterilization   Other Topics Concern   • None   Social History Narrative    Always uses seat belt     Social Determinants of Health     Financial Resource Strain: Not on file   Food Insecurity: Not on file   Transportation Needs: Not on file   Physical Activity: Not on file   Stress: Not on file   Social Connections: Not on file   Intimate Partner Violence: Not on file   Housing Stability: Not on file      Family History:     Family History   Problem Relation Age of Onset   • No Known Problems Mother    • Cancer Father         urinary bladder - unspecified site   • Hypertension Father    • Heart block Father    • No Known Problems Sister    • No Known Problems Daughter    • Diabetes Maternal Grandmother         type 2 - diet controlled   • Arthritis Maternal Grandmother    • Asthma Maternal Grandmother    • Heart disease Maternal Grandmother    • Hypertension Maternal Grandmother    • Heart disease Maternal Grandfather    • Hypertension Maternal Grandfather    • Depression Paternal Grandmother    • Hypertension Paternal Grandmother    • Hypertension Paternal Grandfather    • Heart block Paternal Grandfather    • Heart disease Paternal Grandfather         ill-defined   • Early death Maternal Aunt    • Heart disease Maternal Aunt    • No Known Problems Maternal Aunt    • No Known Problems Maternal Aunt    • No Known Problems Paternal Aunt    • Breast cancer Neg Hx       Current Medications:     Current Outpatient Medications   Medication Sig Dispense Refill   • Multiple Vitamins-Minerals (multivitamin with minerals) tablet Take 1 tablet by mouth daily       No current facility-administered medications for this visit. Allergies: Allergies   Allergen Reactions   • Pollen Extract Itching      Physical Exam:     /82 (BP Location: Left arm, Patient Position: Sitting, Cuff Size: Standard)   Pulse 57   Temp (!) 97.3 °F (36.3 °C) (Tympanic)   Ht 5' 7" (1.702 m)   Wt 89.4 kg (197 lb)   SpO2 98%   BMI 30.85 kg/m²     Physical Exam  Vitals and nursing note reviewed. Constitutional:       Appearance: Normal appearance. HENT:      Head: Normocephalic and atraumatic. Right Ear: Tympanic membrane, ear canal and external ear normal.      Left Ear: Tympanic membrane, ear canal and external ear normal.      Nose: Nose normal.      Mouth/Throat:      Mouth: Mucous membranes are moist.      Pharynx: Oropharynx is clear. Eyes:      Extraocular Movements: Extraocular movements intact.       Conjunctiva/sclera: Conjunctivae normal.   Neck: Thyroid: No thyromegaly. Vascular: No carotid bruit. Cardiovascular:      Rate and Rhythm: Normal rate and regular rhythm. Pulses: Normal pulses. Heart sounds: Normal heart sounds. Pulmonary:      Effort: Pulmonary effort is normal.      Breath sounds: Normal breath sounds. Abdominal:      General: Abdomen is flat. Bowel sounds are normal.      Palpations: Abdomen is soft. There is no hepatomegaly, splenomegaly or mass. Tenderness: There is no abdominal tenderness. There is no right CVA tenderness or left CVA tenderness. Musculoskeletal:         General: Normal range of motion. Cervical back: Normal range of motion and neck supple. Right lower leg: No edema. Left lower leg: No edema. Lymphadenopathy:      Cervical: No cervical adenopathy. Skin:     General: Skin is warm and dry. Neurological:      General: No focal deficit present. Mental Status: She is alert and oriented to person, place, and time. Psychiatric:         Mood and Affect: Mood normal.         Behavior: Behavior normal.         Thought Content:  Thought content normal.         Judgment: Judgment normal.          Albino Caldwell PA-C  [address] [place] [address]

## 2023-08-16 NOTE — PATIENT INSTRUCTIONS
Wellness Visit for Adults   AMBULATORY CARE:   A wellness visit  is when you see your healthcare provider to get screened for health problems. Your healthcare provider will also give you advice on how to stay healthy. Write down your questions so you remember to ask them. Ask your healthcare provider how often you should have a wellness visit. What happens at a wellness visit:  Your healthcare provider will ask about your health, and your family history of health problems. This includes high blood pressure, heart disease, and cancer. He or she will ask if you have symptoms that concern you, if you smoke, and about your mood. You may also be asked about your intake of medicines, supplements, food, and alcohol. Any of the following may be done:  • Your weight  will be checked. Your height may also be checked so your body mass index (BMI) can be calculated. Your BMI shows if you are at a healthy weight. • Your blood pressure  and heart rate will be checked. Your temperature may also be checked. • Blood and urine tests  may be done. Blood tests may be done to check your cholesterol levels. Abnormal cholesterol levels increase your risk for heart disease and stroke. You may also need a blood or urine test to check for diabetes if you are at increased risk. Urine tests may be done to look for signs of an infection or kidney disease. • A physical exam  includes checking your heartbeat and lungs with a stethoscope. Your healthcare provider may also check your skin to look for sun damage. • Screening tests  may be recommended. A screening test is done to check for diseases that may not cause symptoms. The screening tests you may need depend on your age, gender, family history, and lifestyle habits. For example, colorectal screening may be recommended if you are 48years old or older. Screening tests you need if you are a woman:   • A Pap smear  is used to screen for cervical cancer.  Pap smears are usually done every 3 to 5 years depending on your age. You may need them more often if you have had abnormal Pap smear test results in the past. Ask your healthcare provider how often you should have a Pap smear. • A mammogram  is an x-ray of your breasts to screen for breast cancer. Experts recommend mammograms every 2 years starting at age 48 years. You may need a mammogram at age 52 years or younger if you have an increased risk for breast cancer. Talk to your healthcare provider about when you should start having mammograms and how often you need them. Vaccines you may need:   • Get an influenza vaccine  every year. The influenza vaccine protects you from the flu. Several types of viruses cause the flu. The viruses change over time, so new vaccines are made each year. • Get a tetanus-diphtheria (Td) booster vaccine  every 10 years. This vaccine protects you against tetanus and diphtheria. Tetanus is a severe infection that may cause painful muscle spasms and lockjaw. Diphtheria is a severe bacterial infection that causes a thick covering in the back of your mouth and throat. • Get a human papillomavirus (HPV) vaccine  if you are female and aged 23 to 32 or male 23 to 24 and never received it. This vaccine protects you from HPV infection. HPV is the most common infection spread by sexual contact. HPV may also cause vaginal, penile, and anal cancers. • Get a pneumococcal vaccine  if you are aged 72 years or older. The pneumococcal vaccine is an injection given to protect you from pneumococcal disease. Pneumococcal disease is an infection caused by pneumococcal bacteria. The infection may cause pneumonia, meningitis, or an ear infection. • Get a shingles vaccine  if you are 60 or older, even if you have had shingles before. The shingles vaccine is an injection to protect you from the varicella-zoster virus. This is the same virus that causes chickenpox.  Shingles is a painful rash that develops in people who had chickenpox or have been exposed to the virus. How to eat healthy:  My Plate is a model for planning healthy meals. It shows the types and amounts of foods that should go on your plate. Fruits and vegetables make up about half of your plate, and grains and protein make up the other half. A serving of dairy is included on the side of your plate. The amount of calories and serving sizes you need depends on your age, gender, weight, and height. Examples of healthy foods are listed below:  • Eat a variety of vegetables  such as dark green, red, and orange vegetables. You can also include canned vegetables low in sodium (salt) and frozen vegetables without added butter or sauces. • Eat a variety of fresh fruits , canned fruit in 100% juice, frozen fruit, and dried fruit. • Include whole grains. At least half of the grains you eat should be whole grains. Examples include whole-wheat bread, wheat pasta, brown rice, and whole-grain cereals such as oatmeal.    • Eat a variety of protein foods such as seafood (fish and shellfish), lean meat, and poultry without skin (turkey and chicken). Examples of lean meats include pork leg, shoulder, or tenderloin, and beef round, sirloin, tenderloin, and extra lean ground beef. Other protein foods include eggs and egg substitutes, beans, peas, soy products, nuts, and seeds. • Choose low-fat dairy products such as skim or 1% milk or low-fat yogurt, cheese, and cottage cheese. • Limit unhealthy fats  such as butter, hard margarine, and shortening. Exercise:  Exercise at least 30 minutes per day on most days of the week. Some examples of exercise include walking, biking, dancing, and swimming. You can also fit in more physical activity by taking the stairs instead of the elevator or parking farther away from stores. Include muscle strengthening activities 2 days each week. Regular exercise provides many health benefits.  It helps you manage your weight, and decreases your risk for type 2 diabetes, heart disease, stroke, and high blood pressure. Exercise can also help improve your mood. Ask your healthcare provider about the best exercise plan for you. General health and safety guidelines:   • Do not smoke. Nicotine and other chemicals in cigarettes and cigars can cause lung damage. Ask your healthcare provider for information if you currently smoke and need help to quit. E-cigarettes or smokeless tobacco still contain nicotine. Talk to your healthcare provider before you use these products. • Limit alcohol. A drink of alcohol is 12 ounces of beer, 5 ounces of wine, or 1½ ounces of liquor. • Lose weight, if needed. Being overweight increases your risk of certain health conditions. These include heart disease, high blood pressure, type 2 diabetes, and certain types of cancer. • Protect your skin. Do not sunbathe or use tanning beds. Use sunscreen with a SPF 15 or higher. Apply sunscreen at least 15 minutes before you go outside. Reapply sunscreen every 2 hours. Wear protective clothing, hats, and sunglasses when you are outside. • Drive safely. Always wear your seatbelt. Make sure everyone in your car wears a seatbelt. A seatbelt can save your life if you are in an accident. Do not use your cell phone when you are driving. This could distract you and cause an accident. Pull over if you need to make a call or send a text message. • Practice safe sex. Use latex condoms if are sexually active and have more than one partner. Your healthcare provider may recommend screening tests for sexually transmitted infections (STIs). • Wear helmets, lifejackets, and protective gear. Always wear a helmet when you ride a bike or motorcycle, go skiing, or play sports that could cause a head injury. Wear protective equipment when you play sports. Wear a lifejacket when you are on a boat or doing water sports.     © Copyright [employer] 2022 Information is for End User's use only and may not be sold, redistributed or otherwise used for commercial purposes. The above information is an  only. It is not intended as medical advice for individual conditions or treatments. Talk to your doctor, nurse or pharmacist before following any medical regimen to see if it is safe and effective for you. Cholesterol and Your Health   AMBULATORY CARE:   Cholesterol  is a waxy, fat-like substance. Your body uses cholesterol to make hormones and new cells, and to protect nerves. Cholesterol is made by your body. It also comes from certain foods you eat, such as meat and dairy products. Your healthcare provider can help you set goals for your cholesterol levels. He or she can help you create a plan to meet your goals. Cholesterol level goals: Your cholesterol level goals depend on your risk for heart disease, your age, and your other health conditions. The following are general guidelines:  • Total cholesterol  includes low-density lipoprotein (LDL), high-density lipoprotein (HDL), and triglyceride levels. The total cholesterol level should be lower than 200 mg/dL and is best at about 150 mg/dL. • LDL cholesterol  is called bad cholesterol  because it forms plaque in your arteries. As plaque builds up, your arteries become narrow, and less blood flows through. When plaque decreases blood flow to your heart, you may have chest pain. If plaque completely blocks an artery that brings blood to your heart, you may have a heart attack. Plaque can break off and form blood clots. Blood clots may block arteries in your brain and cause a stroke. The level should be less than 130 mg/dL and is best at about 100 mg/dL. • HDL cholesterol  is called good cholesterol  because it helps remove LDL cholesterol from your arteries. It does this by attaching to LDL cholesterol and carrying it to your liver. Your liver breaks down LDL cholesterol so your body can get rid of it.  High levels of HDL cholesterol can help prevent a heart attack and stroke. Low levels of HDL cholesterol can increase your risk for heart disease, heart attack, and stroke. The level should be 60 mg/dL or higher. • Triglycerides  are a type of fat that store energy from foods you eat. High levels of triglycerides also cause plaque buildup. This can increase your risk for a heart attack or stroke. If your triglyceride level is high, your LDL cholesterol level may also be high. The level should be less than 150 mg/dL. Any of the following can increase your risk for high cholesterol:   • Smoking cigarettes    • Being overweight or obese, or not getting enough exercise    • Drinking large amounts of alcohol    • A medical condition such as hypertension (high blood pressure) or diabetes    • Certain genes passed from your parents to you    • Age older than 65 years    What you need to know about having your cholesterol levels checked: Adults 21to 39years of age should have their cholesterol levels checked every 4 to 6 years. Adults 45 years or older should have their cholesterol checked every 1 to 2 years. You may need your cholesterol checked more often, or at a younger age, if you have risk factors for heart disease. You may also need to have your cholesterol checked more often if you have other health conditions, such as diabetes. Blood tests are used to check cholesterol levels. Blood tests measure your levels of triglycerides, LDL cholesterol, and HDL cholesterol. How healthy fats affect your cholesterol levels:  Healthy fats, also called unsaturated fats, help lower LDL cholesterol and triglyceride levels. Healthy fats include the following:  • Monounsaturated fats  are found in foods such as olive oil, canola oil, avocado, nuts, and olives. • Polyunsaturated fats,  such as omega 3 fats, are found in fish, such as salmon, trout, and tuna.  They can also be found in plant foods such as flaxseed, walnuts, and soybeans. How unhealthy fats affect your cholesterol levels:  Unhealthy fats increase LDL cholesterol and triglyceride levels. They are found in foods high in cholesterol, saturated fat, and trans fat:  • Cholesterol  is found in eggs, dairy, and meat. • Saturated fat  is found in butter, cheese, ice cream, whole milk, and coconut oil. Saturated fat is also found in meat, such as sausage, hot dogs, and bologna. • Trans fat  is found in liquid oils and is used in fried and baked foods. Foods that contain trans fats include chips, crackers, muffins, sweet rolls, microwave popcorn, and cookies. Treatment  for high cholesterol will also decrease your risk of heart disease, heart attack, and stroke. Treatment may include any of the following:  • Lifestyle changes  may include food, exercise, weight loss, and quitting smoking. You may also need to decrease the amount of alcohol you drink. Your healthcare provider will want you to start with lifestyle changes. Other treatment may be added if lifestyle changes are not enough. Your healthcare provider may recommend you work with a team to manage hyperlipidemia. The team may include medical experts such as a dietitian, an exercise or physical therapist, and a behavior therapist. Your family members may be included in helping you create lifestyle changes. • Medicines  may be given to lower your LDL cholesterol, triglyceride levels, or total cholesterol level. You may need medicines to lower your cholesterol if any of the following is true:    ? You have a history of stroke, TIA, unstable angina, or a heart attack. ? Your LDL cholesterol level is 190 mg/dL or higher. ? You are age 36 to 76 years, have diabetes or heart disease risk factors, and your LDL cholesterol is 70 mg/dL or higher. • Supplements  include fish oil, red yeast rice, and garlic. Fish oil may help lower your triglyceride and LDL cholesterol levels.  It may also increase your HDL cholesterol level. Red yeast rice may help decrease your total cholesterol level and LDL cholesterol level. Garlic may help lower your total cholesterol level. Do not take any supplements without talking to your healthcare provider. Food changes you can make to lower your cholesterol levels:  A dietitian can help you create a healthy eating plan. He or she can show you how to read food labels and choose foods low in saturated fat, trans fats, and cholesterol. • Decrease the total amount of fat you eat. Choose lean meats, fat-free or 1% fat milk, and low-fat dairy products, such as yogurt and cheese. Try to limit or avoid red meats. Limit or do not eat fried foods or baked goods, such as cookies. • Replace unhealthy fats with healthy fats. Cook foods in olive oil or canola oil. Choose soft margarines that are low in saturated fat and trans fat. Seeds, nuts, and avocados are other examples of healthy fats. • Eat foods with omega-3 fats. Examples include salmon, tuna, mackerel, walnuts, and flaxseed. Eat fish 2 times per week. Pregnant women should not eat fish that have high levels of mercury, such as shark, swordfish, and bob mackerel. • Increase the amount of high-fiber foods you eat. High-fiber foods can help lower your LDL cholesterol. Aim to get between 20 and 30 grams of fiber each day. Fruits and vegetables are high in fiber. Eat at least 5 servings each day. Other high-fiber foods are whole-grain or whole-wheat breads, pastas, or cereals, and brown rice. Eat 3 ounces of whole-grain foods each day. Increase fiber slowly. You may have abdominal discomfort, bloating, and gas if you add fiber to your diet too quickly. • Eat healthy protein foods. Examples include low-fat dairy products, skinless chicken and turkey, fish, and nuts. • Limit foods and drinks that are high in sugar.   Your dietitian or healthcare provider can help you create daily limits for high-sugar foods and drinks. The limit may be lower if you have diabetes or another health condition. Limits can also help you lose weight if needed. Lifestyle changes you can make to lower your cholesterol levels:   • Maintain a healthy weight. Ask your healthcare provider what a healthy weight is for you. Ask him or her to help you create a weight loss plan if needed. Weight loss can decrease your total cholesterol and triglyceride levels. Weight loss may also help keep your blood pressure at a healthy level. • Be physically active throughout the day. Physical activity, such as exercise, can help lower your total cholesterol level and maintain a healthy weight. Physical activity can also help increase your HDL cholesterol level. Work with your healthcare provider to create an program that is right for you. Get at least 30 to 40 minutes of moderate physical activity most days of the week. Examples of exercise include brisk walking, swimming, or biking. Also include strength training at least 2 times each week. Your healthcare providers can help you create a physical activity plan. • Do not smoke. Nicotine and other chemicals in cigarettes and cigars can raise your cholesterol levels. Ask your healthcare provider for information if you currently smoke and need help to quit. E-cigarettes or smokeless tobacco still contain nicotine. Talk to your healthcare provider before you use these products. • Limit or do not drink alcohol. Alcohol can increase your triglyceride levels. Ask your healthcare provider before you drink alcohol. Ask how much is okay for you to drink in 24 hours or 1 week. Follow up with your doctor as directed:  Write down your questions so you remember to ask them during your visits. © Copyright [employer] 2022 Information is for End User's use only and may not be sold, redistributed or otherwise used for commercial purposes. The above information is an  only.  It is not intended as medical advice for individual conditions or treatments. Talk to your doctor, nurse or pharmacist before following any medical regimen to see if it is safe and effective for you.

## 2023-08-17 ENCOUNTER — APPOINTMENT (OUTPATIENT)
Dept: LAB | Facility: HOSPITAL | Age: 43
End: 2023-08-17
Payer: COMMERCIAL

## 2023-08-17 DIAGNOSIS — Z13.29 SCREENING FOR THYROID DISORDER: ICD-10-CM

## 2023-08-17 DIAGNOSIS — Z13.21 ENCOUNTER FOR VITAMIN DEFICIENCY SCREENING: ICD-10-CM

## 2023-08-17 DIAGNOSIS — Z13.6 SCREENING FOR CARDIOVASCULAR CONDITION: ICD-10-CM

## 2023-08-17 LAB
25(OH)D3 SERPL-MCNC: 39.6 NG/ML (ref 30–100)
ALBUMIN SERPL BCP-MCNC: 4.3 G/DL (ref 3.5–5)
ALP SERPL-CCNC: 62 U/L (ref 34–104)
ALT SERPL W P-5'-P-CCNC: 27 U/L (ref 7–52)
ANION GAP SERPL CALCULATED.3IONS-SCNC: 5 MMOL/L
AST SERPL W P-5'-P-CCNC: 17 U/L (ref 13–39)
BASOPHILS # BLD AUTO: 0.04 THOUSANDS/ÂΜL (ref 0–0.1)
BASOPHILS NFR BLD AUTO: 1 % (ref 0–1)
BILIRUB SERPL-MCNC: 0.78 MG/DL (ref 0.2–1)
BUN SERPL-MCNC: 15 MG/DL (ref 5–25)
CALCIUM SERPL-MCNC: 9.1 MG/DL (ref 8.4–10.2)
CHLORIDE SERPL-SCNC: 105 MMOL/L (ref 96–108)
CHOLEST SERPL-MCNC: 173 MG/DL
CO2 SERPL-SCNC: 28 MMOL/L (ref 21–32)
CREAT SERPL-MCNC: 1.04 MG/DL (ref 0.6–1.3)
EOSINOPHIL # BLD AUTO: 0.1 THOUSAND/ÂΜL (ref 0–0.61)
EOSINOPHIL NFR BLD AUTO: 2 % (ref 0–6)
ERYTHROCYTE [DISTWIDTH] IN BLOOD BY AUTOMATED COUNT: 11.9 % (ref 11.6–15.1)
GFR SERPL CREATININE-BSD FRML MDRD: 65 ML/MIN/1.73SQ M
GLUCOSE P FAST SERPL-MCNC: 90 MG/DL (ref 65–99)
HCT VFR BLD AUTO: 41.1 % (ref 34.8–46.1)
HDLC SERPL-MCNC: 60 MG/DL
HGB BLD-MCNC: 13.8 G/DL (ref 11.5–15.4)
IMM GRANULOCYTES # BLD AUTO: 0.01 THOUSAND/UL (ref 0–0.2)
IMM GRANULOCYTES NFR BLD AUTO: 0 % (ref 0–2)
LDLC SERPL CALC-MCNC: 92 MG/DL (ref 0–100)
LYMPHOCYTES # BLD AUTO: 1.5 THOUSANDS/ÂΜL (ref 0.6–4.47)
LYMPHOCYTES NFR BLD AUTO: 29 % (ref 14–44)
MCH RBC QN AUTO: 29.3 PG (ref 26.8–34.3)
MCHC RBC AUTO-ENTMCNC: 33.6 G/DL (ref 31.4–37.4)
MCV RBC AUTO: 87 FL (ref 82–98)
MONOCYTES # BLD AUTO: 0.4 THOUSAND/ÂΜL (ref 0.17–1.22)
MONOCYTES NFR BLD AUTO: 8 % (ref 4–12)
NEUTROPHILS # BLD AUTO: 3.07 THOUSANDS/ÂΜL (ref 1.85–7.62)
NEUTS SEG NFR BLD AUTO: 60 % (ref 43–75)
NRBC BLD AUTO-RTO: 0 /100 WBCS
PLATELET # BLD AUTO: 177 THOUSANDS/UL (ref 149–390)
PMV BLD AUTO: 11.5 FL (ref 8.9–12.7)
POTASSIUM SERPL-SCNC: 3.8 MMOL/L (ref 3.5–5.3)
PROT SERPL-MCNC: 6.7 G/DL (ref 6.4–8.4)
RBC # BLD AUTO: 4.71 MILLION/UL (ref 3.81–5.12)
SODIUM SERPL-SCNC: 138 MMOL/L (ref 135–147)
TRIGL SERPL-MCNC: 105 MG/DL
TSH SERPL DL<=0.05 MIU/L-ACNC: 3.33 UIU/ML (ref 0.45–4.5)
WBC # BLD AUTO: 5.12 THOUSAND/UL (ref 4.31–10.16)

## 2023-08-17 PROCEDURE — 80061 LIPID PANEL: CPT

## 2023-08-17 PROCEDURE — 85025 COMPLETE CBC W/AUTO DIFF WBC: CPT

## 2023-08-17 PROCEDURE — 84443 ASSAY THYROID STIM HORMONE: CPT

## 2023-08-17 PROCEDURE — 82306 VITAMIN D 25 HYDROXY: CPT

## 2023-08-17 PROCEDURE — 36415 COLL VENOUS BLD VENIPUNCTURE: CPT

## 2023-08-17 PROCEDURE — 80053 COMPREHEN METABOLIC PANEL: CPT

## 2023-08-30 ENCOUNTER — CONSULT (OUTPATIENT)
Dept: SURGERY | Facility: CLINIC | Age: 43
End: 2023-08-30
Payer: COMMERCIAL

## 2023-08-30 VITALS
RESPIRATION RATE: 18 BRPM | HEART RATE: 67 BPM | OXYGEN SATURATION: 97 % | TEMPERATURE: 97.9 F | DIASTOLIC BLOOD PRESSURE: 80 MMHG | SYSTOLIC BLOOD PRESSURE: 142 MMHG | BODY MASS INDEX: 31.11 KG/M2 | WEIGHT: 198.2 LBS | HEIGHT: 67 IN

## 2023-08-30 DIAGNOSIS — D17.20 LIPOMA OF UPPER EXTREMITY, UNSPECIFIED LATERALITY: ICD-10-CM

## 2023-08-30 DIAGNOSIS — D17.1 LIPOMA OF BUTTOCK: ICD-10-CM

## 2023-08-30 PROCEDURE — 99203 OFFICE O/P NEW LOW 30 MIN: CPT | Performed by: SURGERY

## 2023-08-30 RX ORDER — SODIUM CHLORIDE, SODIUM LACTATE, POTASSIUM CHLORIDE, CALCIUM CHLORIDE 600; 310; 30; 20 MG/100ML; MG/100ML; MG/100ML; MG/100ML
125 INJECTION, SOLUTION INTRAVENOUS
OUTPATIENT
Start: 2023-08-31 | End: 2023-09-01

## 2023-08-30 NOTE — PROGRESS NOTES
Consult- General Surgery   Annamarie Guevara 37 y.o. female MRN: [id number]  Unit/Bed#:  Encounter: [id number]    Assessment/Plan     Assessment:  Soft tissue mass of the left upper extremity, right upper extremity and right buttock  Morbid obesity with BMI of 31  Plan:  In light of the size and symptoms I advised the patient to undergo excision of soft tissue mass from the left forearm, right forearm and right buttock. I discussed the operative procedure, risk, benefits and alternatives, but not limited to bleeding, infection after surgery, recurrence, injury to adjacent organs, need for furher operations, loss of time from work, the patient understood and agreed to proceed with the above surgery. History of Present Illness     HPI:  Annamarie Guevara is a 37 y.o. female who presents to my office for evaluation of soft tissue mass from the left upper extremity, right upper extremity and right buttock. The patient is known to have lumps on the upper extremities and right buttock for quite some time, she was advised observation but leave the she has been having discomfort and pain to touch and when sitting. She denies having any drainage, redness or any other constitutional symptoms. He has noted that the right buttock lump has increased in size. The patient was seen by her primary care physician and referred to us for surgical evaluation. Review of Systems  The rest of the review of system total of 10 were negative except for the HPI.     Historical Information   Past Medical History:   Diagnosis Date   • Migraine    • Varicella      Past Surgical History:   Procedure Laterality Date   • ANTERIOR CRUCIATE LIGAMENT REPAIR Right    • [place] ARTHRS KNE SURG W/MENISCECTOMY MED/LAT W/SHVG Right 7/1/2022    Procedure: ARTHROSCOPY KNEE- Right Knee Subtotal medial partial lateral meniscectomy, chondroplasty medial femoral condyle and patella, loose body medial.;  Surgeon: Suszanne Dakins, DO;  Location: [facility] OR; Service: Orthopedics   • VAGINAL DELIVERY      x4     Social History   Social History     Substance and Sexual Activity   Alcohol Use Yes    Comment: rarely      Social History     Substance and Sexual Activity   Drug Use No     Social History     Tobacco Use   Smoking Status Never   Smokeless Tobacco Never     Family History: non-contributory    Meds/Allergies   all medications and allergies reviewed     Current Outpatient Medications:   •  Multiple Vitamins-Minerals (multivitamin with minerals) tablet, Take 1 tablet by mouth daily, Disp: , Rfl:   Allergies   Allergen Reactions   • Pollen Extract Itching       Objective     Current Vitals:   Blood Pressure: 142/80 (08/30/23 1446)  Pulse: 67 (08/30/23 1446)  Temperature: 97.9 °F (36.6 °C) (08/30/23 1446)  Respirations: 18 (08/30/23 1446)  Height: 5' 7" (170.2 cm) (08/30/23 1446)  Weight - Scale: 89.9 kg (198 lb 3.2 oz) (08/30/23 1446)  SpO2: 97 % (08/30/23 1446)    Physical Exam  Vitals and nursing note reviewed. Constitutional:       General: She is not in acute distress. Appearance: She is obese. Cardiovascular:      Rate and Rhythm: Normal rate and regular rhythm. Heart sounds: No murmur heard. Pulmonary:      Effort: No respiratory distress. Breath sounds: Normal breath sounds. Abdominal:      Palpations: Abdomen is soft. There is no mass. Tenderness: There is no abdominal tenderness. Skin:     General: Skin is warm. Coloration: Skin is not jaundiced. Findings: No erythema or rash. Comments: Soft tissue mass left forearm 1.5 cm  Soft tissue mass right forearm 1 cm  Soft tissue mass right buttock 2.5 cm   Neurological:      Mental Status: She is alert and oriented to person, place, and time. Cranial Nerves: No cranial nerve deficit.    Psychiatric:         Mood and Affect: Mood normal.         Behavior: Behavior normal.

## 2023-10-12 ENCOUNTER — TELEPHONE (OUTPATIENT)
Dept: SURGERY | Facility: CLINIC | Age: 43
End: 2023-10-12

## 2023-10-12 NOTE — TELEPHONE ENCOUNTER
Patient was originally scheduled for 10/18/23 with Dr. Stefanie Marie however Dr. Watts Sites asked me to move the pt's from 10/18/23 to 10/17/23. I called her to inform her and she is not able to come in this day. She was driving and unable to discuss a new date. She sated should would call to reschedule. If she calls and I am not here please reschedule her.  Thank you

## 2023-12-04 ENCOUNTER — OFFICE VISIT (OUTPATIENT)
Dept: CARDIOLOGY CLINIC | Facility: CLINIC | Age: 43
End: 2023-12-04
Payer: COMMERCIAL

## 2023-12-04 VITALS
BODY MASS INDEX: 31.08 KG/M2 | HEART RATE: 73 BPM | RESPIRATION RATE: 21 BRPM | OXYGEN SATURATION: 97 % | SYSTOLIC BLOOD PRESSURE: 130 MMHG | DIASTOLIC BLOOD PRESSURE: 78 MMHG | WEIGHT: 198 LBS | HEIGHT: 67 IN

## 2023-12-04 DIAGNOSIS — Z82.49 FAMILY HISTORY OF HEART DISEASE IN MALE FAMILY MEMBER BEFORE AGE 55: ICD-10-CM

## 2023-12-04 PROCEDURE — 99204 OFFICE O/P NEW MOD 45 MIN: CPT | Performed by: INTERNAL MEDICINE

## 2023-12-04 NOTE — PROGRESS NOTES
OP Consultation - Cardiology    [facility]  37 y.o. female MRN: [id number]    Physician Requesting Consult: Delilah Massey MD    Reason for Consult / Chief Complaint: New patient, establish care for family history    HPI:     51-year-old female with no significant past medical history presents to establish care. Patient discussed about her family history of heart disease. Saw her PCP and was recommended cardiology evaluation. Denies chest pain, chest pressure, shortness of breath, dizziness, lightheadedness, presyncope or syncope. Denies orthopnea, PND or lower limb edema. She used to run in the past.  Has been relatively sedentary in the past few years. Concerned about her family history    In summer, she has been walking two miles    No personal history of CAD, CHF     FLP in :  , , LDL 80    Family History:  Father in mid 46s had stents, HTN  Maternal grand father had dilated heart and  in his 46s. Similar in her cousins  No cardiac issues in siblings.     Social History:  Tobacco:Never  Alcohol:Socially  Marijuana: none  Work as a homemaker, [employer]    FAMILY HISTORY:  Family History   Problem Relation Age of Onset    No Known Problems Mother     Cancer Father         urinary bladder - unspecified site    Hypertension Father     Heart block Father     No Known Problems Sister     No Known Problems Daughter     Diabetes Maternal Grandmother         type 2 - diet controlled    Arthritis Maternal Grandmother     Asthma Maternal Grandmother     Heart disease Maternal Grandmother     Hypertension Maternal Grandmother     Heart disease Maternal Grandfather     Hypertension Maternal Grandfather     Depression Paternal Grandmother     Hypertension Paternal Grandmother     Hypertension Paternal Grandfather     Heart block Paternal Grandfather     Heart disease Paternal Grandfather         ill-defined    Early death Maternal Aunt     Heart disease Maternal Aunt     No Known Problems Maternal Aunt     No Known Problems Maternal Aunt     No Known Problems Paternal Aunt     Breast cancer Neg Hx         MEDS & ALLERGIES:  Allergies   Allergen Reactions    Pollen Extract Itching         Current Outpatient Medications:     Multiple Vitamins-Minerals (multivitamin with minerals) tablet, Take 1 tablet by mouth daily, Disp: , Rfl:     Past Medical History:   Diagnosis Date    Migraine     Varicella          Review of Systems:  Review of Systems   Constitutional: Negative. Negative for activity change, appetite change and fever. Respiratory:  Negative for cough, chest tightness and shortness of breath. Cardiovascular:  Negative for chest pain, palpitations and leg swelling. Gastrointestinal:  Negative for nausea and vomiting. Musculoskeletal:  Negative for arthralgias, back pain and gait problem. Skin:  Negative for color change and pallor. Neurological:  Negative for dizziness, syncope, weakness and light-headedness. Psychiatric/Behavioral:  Negative for agitation. All other systems reviewed and are negative. PHYSICAL EXAM:  Vitals:   Vitals:    12/04/23 0913   BP: 130/78   BP Location: Left arm   Patient Position: Sitting   Cuff Size: Standard   Pulse: 73   Resp: 21   SpO2: 97%   Weight: 89.8 kg (198 lb)   Height: 5' 7" (1.702 m)        Physical Exam:  Physical Exam  Vitals and nursing note reviewed. Constitutional:       General: She is not in acute distress. Appearance: Normal appearance. She is not ill-appearing or diaphoretic. HENT:      Head: Normocephalic and atraumatic. Eyes:      Extraocular Movements: Extraocular movements intact. Cardiovascular:      Rate and Rhythm: Normal rate and regular rhythm. Heart sounds: No murmur heard. No friction rub. No gallop. Pulmonary:      Effort: Pulmonary effort is normal. No respiratory distress. Breath sounds: Normal breath sounds. Abdominal:      General: There is no distension. Palpations: Abdomen is soft. Musculoskeletal:         General: No swelling. Normal range of motion. Cervical back: Normal range of motion. Skin:     General: Skin is warm and dry. Capillary Refill: Capillary refill takes less than 2 seconds. Coloration: Skin is not pale. Neurological:      General: No focal deficit present. Mental Status: She is alert and oriented to person, place, and time. Cranial Nerves: No cranial nerve deficit. Psychiatric:         Mood and Affect: Mood normal.         Behavior: Behavior normal.         LABORATORY RESULTS:    Lab Results   Component Value Date    WBC 5.12 08/17/2023    HGB 13.8 08/17/2023    HCT 41.1 08/17/2023    MCV 87 08/17/2023     08/17/2023     Lab Results   Component Value Date    CALCIUM 9.1 08/17/2023    K 3.8 08/17/2023    CO2 28 08/17/2023     08/17/2023    BUN 15 08/17/2023    CREATININE 1.04 08/17/2023     No results found for: "HGBA1C"    Lipid Profile:   No results found for: "CHOL"  Lab Results   Component Value Date    HDL 60 08/17/2023    HDL 72 09/01/2020     Lab Results   Component Value Date    LDLCALC 92 08/17/2023    LDLCALC 107 (H) 09/01/2020     Lab Results   Component Value Date    TRIG 105 08/17/2023    TRIG 75 09/01/2020       The 10-year ASCVD risk score (Pat PINZON, et al., 2019) is: 0.5%    Values used to calculate the score:      Age: 37 years      Sex: Female      Is Non- : No      Diabetic: No      Tobacco smoker: No      Systolic Blood Pressure: 133 mmHg      Is BP treated: No      HDL Cholesterol: 60 mg/dL      Total Cholesterol: 173 mg/dL    Imaging: I have personally reviewed pertinent reports. No results found. EKG reviewed personally: Normal sinus rhythm    Assessment and Plan:    Wagner Lei was seen today for new patient visit.     Diagnoses and all orders for this visit:    Family history of heart disease in male family member before age 54  -     Ambulatory Referral to Cardiology         Family history of heart disease      Patient reports family history of heart disease. She is here for cardiac evaluation. Will check an exercise stress test to evaluate for exercise tolerance. Will check echocardiogram to evaluate for structural heart disease. She does have a family history of reported dilated cardiomyopathy. No further details available. Will check a calcium score for further evaluation. Recent lipid profile was unremarkable. Discussed lifestyle changes including diet and exercise with patient. Recommend 30 minutes of exercise daily 5 days a week. She will inform us with onset of cardiac symptoms.     Return to clinic in 3 months or GLENYS Gay MD  12/4/2023,9:23 AM

## 2023-12-04 NOTE — PROGRESS NOTES
Cardiology Follow Up    Osmel Rossi  1980  [id number]  [facility] [address] [id number] [phone]    Discussion/Summary:        Previous studies were reviewed. Safety measures were reviewed. Questions were entertained and answered. Patient was advised to report any problems requiring medical attention. Follow-up with PCP and appropriate specialist and lab work as discussed. Return for follow up visit as scheduled or earlier, if needed. Thank you for allowing me to participate in the care and evaluation of your patient. Should you have any questions, please feel free to contact me. History of Present Illness:     Osmel Rossi is a 37 y.o. female who presents for follow up for cardiovascular care.         Patient Active Problem List   Diagnosis    Triceps tendinitis    Carpal tunnel syndrome of right wrist    Breast nodule    Internal derangement of right knee    Preoperative examination    Elevated blood pressure reading    Family history of heart disease in male family member before age 54     Past Medical History:   Diagnosis Date    Migraine     Varicella      Social History     Socioeconomic History    Marital status: /Civil Union     Spouse name: Not on file    Number of children: Not on file    Years of education: Not on file    Highest education level: Not on file   Occupational History    Not on file   Tobacco Use    Smoking status: Never    Smokeless tobacco: Never   Vaping Use    Vaping Use: Never used   Substance and Sexual Activity    Alcohol use: Not Currently    Drug use: Never    Sexual activity: Yes     Partners: Male     Birth control/protection: Male Sterilization   Other Topics Concern    Not on file   Social History Narrative    Always uses seat belt     Social Determinants of Health     Financial Resource Strain: Not on file Food Insecurity: Not on file   Transportation Needs: Not on file   Physical Activity: Not on file   Stress: Not on file   Social Connections: Not on file   Intimate Partner Violence: Not on file   Housing Stability: Not on file      Family History   Problem Relation Age of Onset    No Known Problems Mother     Cancer Father         urinary bladder - unspecified site    Hypertension Father     Heart block Father     No Known Problems Sister     No Known Problems Daughter     Diabetes Maternal Grandmother         type 2 - diet controlled    Arthritis Maternal Grandmother     Asthma Maternal Grandmother     Heart disease Maternal Grandmother     Hypertension Maternal Grandmother     Heart disease Maternal Grandfather     Hypertension Maternal Grandfather     Depression Paternal Grandmother     Hypertension Paternal Grandmother     Hypertension Paternal Grandfather     Heart block Paternal Grandfather     Heart disease Paternal Grandfather         ill-defined    Early death Maternal Aunt     Heart disease Maternal Aunt     No Known Problems Maternal Aunt     No Known Problems Maternal Aunt     No Known Problems Paternal Aunt     Breast cancer Neg Hx      Past Surgical History:   Procedure Laterality Date    ANTERIOR CRUCIATE LIGAMENT REPAIR Right     [place] ARTHRS KNE SURG W/MENISCECTOMY MED/LAT W/SHVG Right 7/1/2022    Procedure: ARTHROSCOPY KNEE- Right Knee Subtotal medial partial lateral meniscectomy, chondroplasty medial femoral condyle and patella, loose body medial.;  Surgeon: Geraldine Knight DO;  Location: [facility];  Service: Orthopedics    VAGINAL DELIVERY      x4       Current Outpatient Medications:     Multiple Vitamins-Minerals (multivitamin with minerals) tablet, Take 1 tablet by mouth daily, Disp: , Rfl:   Allergies   Allergen Reactions    Pollen Extract Itching       Labs:  Lab Results   Component Value Date    WBC 5.12 08/17/2023    HGB 13.8 08/17/2023    HCT 41.1 08/17/2023    MCV 87 08/17/2023    PLT 177 08/17/2023     Lab Results   Component Value Date    CALCIUM 9.1 08/17/2023    K 3.8 08/17/2023    CO2 28 08/17/2023     08/17/2023    BUN 15 08/17/2023    CREATININE 1.04 08/17/2023     No results found for: "HGBA1C"  No results found for: "CHOL"  Lab Results   Component Value Date    HDL 60 08/17/2023    HDL 72 09/01/2020     Lab Results   Component Value Date    LDLCALC 92 08/17/2023    LDLCALC 107 (H) 09/01/2020     Lab Results   Component Value Date    TRIG 105 08/17/2023    TRIG 75 09/01/2020     No results found for: "CHOLHDL"    Review of Systems:  Review of Systems    Physical Exam:  Physical Exam

## 2023-12-13 ENCOUNTER — TELEPHONE (OUTPATIENT)
Dept: CARDIOLOGY CLINIC | Facility: CLINIC | Age: 43
End: 2023-12-13

## 2023-12-13 ENCOUNTER — HOSPITAL ENCOUNTER (OUTPATIENT)
Dept: NON INVASIVE DIAGNOSTICS | Facility: CLINIC | Age: 43
Discharge: HOME | End: 2023-12-13
Payer: COMMERCIAL

## 2023-12-13 ENCOUNTER — TELEPHONE (OUTPATIENT)
Dept: FAMILY MEDICINE CLINIC | Facility: CLINIC | Age: 43
End: 2023-12-13

## 2023-12-13 VITALS
DIASTOLIC BLOOD PRESSURE: 110 MMHG | SYSTOLIC BLOOD PRESSURE: 170 MMHG | HEIGHT: 67 IN | BODY MASS INDEX: 31.08 KG/M2 | WEIGHT: 198 LBS | HEART RATE: 72 BPM

## 2023-12-13 VITALS
HEIGHT: 67 IN | SYSTOLIC BLOOD PRESSURE: 130 MMHG | DIASTOLIC BLOOD PRESSURE: 78 MMHG | HEART RATE: 70 BPM | WEIGHT: 198 LBS | BODY MASS INDEX: 31.08 KG/M2

## 2023-12-13 DIAGNOSIS — I49.3 PVC (PREMATURE VENTRICULAR CONTRACTION): Primary | ICD-10-CM

## 2023-12-13 DIAGNOSIS — Z82.49 FAMILY HISTORY OF HEART DISEASE IN MALE FAMILY MEMBER BEFORE AGE 55: ICD-10-CM

## 2023-12-13 LAB
AORTIC ROOT: 3.2 CM
APICAL FOUR CHAMBER EJECTION FRACTION: 48 %
ASCENDING AORTA: 3.4 CM
FRACTIONAL SHORTENING: 29 (ref 28–44)
INTERVENTRICULAR SEPTUM IN DIASTOLE (PARASTERNAL SHORT AXIS VIEW): 0.8 CM
INTERVENTRICULAR SEPTUM: 0.8 CM (ref 0.6–1.1)
LAAS-AP2: 21.2 CM2
LAAS-AP4: 22.8 CM2
LEFT ATRIUM SIZE: 3.4 CM
LEFT ATRIUM VOLUME (MOD BIPLANE): 71 ML
LEFT ATRIUM VOLUME INDEX (MOD BIPLANE): 35.3 ML/M2
LEFT INTERNAL DIMENSION IN SYSTOLE: 3.6 CM (ref 2.1–4)
LEFT VENTRICULAR INTERNAL DIMENSION IN DIASTOLE: 5.1 CM (ref 3.5–6)
LEFT VENTRICULAR POSTERIOR WALL IN END DIASTOLE: 1 CM
LEFT VENTRICULAR STROKE VOLUME: 67 ML
LVSV (TEICH): 67 ML
MAX DIASTOLIC BP: 110 MMHG
MAX PREDICTED HEART RATE: 177 BPM
MV E'TISSUE VEL-SEP: 7 CM/S
PROTOCOL NAME: NORMAL
RIGHT ATRIUM AREA SYSTOLE A4C: 14.2 CM2
RIGHT VENTRICLE ID DIMENSION: 2.5 CM
SL CV LEFT ATRIUM LENGTH A2C: 5.4 CM
SL CV LV EF: 50
SL CV PED ECHO LEFT VENTRICLE DIASTOLIC VOLUME (MOD BIPLANE) 2D: 122 ML
SL CV PED ECHO LEFT VENTRICLE SYSTOLIC VOLUME (MOD BIPLANE) 2D: 55 ML
STRESS BASELINE BP: NORMAL MMHG
STRESS BASELINE HR: 62 BPM
STRESS O2 SAT REST: 100 %
STRESS POST EXERCISE DUR MIN: 0 MIN
STRESS POST EXERCISE DUR SEC: 0 SEC
STRESS POST PEAK HR: 81 BPM
STRESS POST PEAK SYSTOLIC BP: 142 MMHG
TARGET HR FORMULA: NORMAL
TEST INDICATION: NORMAL
TRICUSPID ANNULAR PLANE SYSTOLIC EXCURSION: 2 CM

## 2023-12-13 PROCEDURE — 93306 TTE W/DOPPLER COMPLETE: CPT | Performed by: INTERNAL MEDICINE

## 2023-12-13 PROCEDURE — 93018 CV STRESS TEST I&R ONLY: CPT | Performed by: INTERNAL MEDICINE

## 2023-12-13 PROCEDURE — 93016 CV STRESS TEST SUPVJ ONLY: CPT | Performed by: INTERNAL MEDICINE

## 2023-12-13 PROCEDURE — 93306 TTE W/DOPPLER COMPLETE: CPT

## 2023-12-13 PROCEDURE — 93017 CV STRESS TEST TRACING ONLY: CPT

## 2023-12-13 NOTE — TELEPHONE ENCOUNTER
----- Message from Johnathan Lemus MD sent at 12/13/2023  2:48 PM EST -----  Please let patient know that her BP was high at stress test and she was noted to have skipped beats    Please schedule patient for a 2 week event monitor    Please schedule patient for a follow up in 3-4 weeks to go over next steps    Thanks  AQ

## 2023-12-13 NOTE — NURSING NOTE
Pt is unable to have her treadmill stress test today due to elevated BP readings of 172/110 and then after 15 minute res 142/110. Pt encouraged to follow up with her PCP. Patient stated this happens every time  she has any procedures done due to nervousness/anxiety.

## 2023-12-13 NOTE — TELEPHONE ENCOUNTER
T/c from pt -- went for stress test ordered by Tomeka, but it couldn't be completed due to her high BP. First reading was 170/110. After sitting for about 10 minutes, it was 142/110. Pt was advised to consult with Tomeka.    Please advise.

## 2023-12-13 NOTE — TELEPHONE ENCOUNTER
Spoke to pt and relayed Dr. Thornton Courser response, pt verbalized understanding. Pt stated she was advised by testing to contact PCP to see if they can put pt on BP meds or something to calm her down prior to re-testing.     Clerical:  Please schedule pt for monitor placement and follow 3-4 week f/u as per Dr. Thornton Courser

## 2023-12-14 DIAGNOSIS — I10 PRIMARY HYPERTENSION: Primary | ICD-10-CM

## 2023-12-14 RX ORDER — ATENOLOL 25 MG/1
25 TABLET ORAL DAILY
Qty: 30 TABLET | Refills: 1 | Status: SHIPPED | OUTPATIENT
Start: 2023-12-14

## 2023-12-14 NOTE — TELEPHONE ENCOUNTER
Pt called & stated due to her BP being high her primary care doctor has prescribed her Atenolol  25 MG. Pt would like to know if she is ok to take this medication?     PT can be reached at [phone]

## 2023-12-14 NOTE — TELEPHONE ENCOUNTER
I sent in for a medication for high blood pressure. Have pt follow up in 2 weeks for blood pressure reading.

## 2023-12-15 NOTE — TELEPHONE ENCOUNTER
Spoke with pt and Krystin's message was relayed that she can take Atenolol but will likely decrease her palpitations.

## 2023-12-18 ENCOUNTER — CLINICAL SUPPORT (OUTPATIENT)
Dept: CARDIOLOGY CLINIC | Facility: CLINIC | Age: 43
End: 2023-12-18
Payer: COMMERCIAL

## 2023-12-18 ENCOUNTER — HOSPITAL ENCOUNTER (OUTPATIENT)
Dept: CT IMAGING | Facility: CLINIC | Age: 43
Discharge: HOME | End: 2023-12-18
Payer: COMMERCIAL

## 2023-12-18 ENCOUNTER — TELEPHONE (OUTPATIENT)
Dept: CARDIOLOGY CLINIC | Facility: CLINIC | Age: 43
End: 2023-12-18

## 2023-12-18 DIAGNOSIS — Z82.49 FAMILY HISTORY OF HEART DISEASE IN MALE FAMILY MEMBER BEFORE AGE 55: ICD-10-CM

## 2023-12-18 DIAGNOSIS — Z82.49 FAMILY HISTORY OF HEART DISEASE IN MALE FAMILY MEMBER BEFORE AGE 55: Primary | ICD-10-CM

## 2023-12-18 PROCEDURE — 99211 OFF/OP EST MAY X REQ PHY/QHP: CPT | Performed by: INTERNAL MEDICINE

## 2023-12-18 PROCEDURE — G1004 CDSM NDSC: HCPCS

## 2023-12-18 PROCEDURE — 75571 CT HRT W/O DYE W/CA TEST: CPT

## 2023-12-18 NOTE — PROGRESS NOTES
Patient came into the office today for 2 wk biotel as per .     Biotel was successfully placed.    Instruction were successfully given to pt.    Patient verbally understood.

## 2023-12-26 ENCOUNTER — TELEPHONE (OUTPATIENT)
Dept: CARDIOLOGY CLINIC | Facility: CLINIC | Age: 43
End: 2023-12-26

## 2023-12-26 NOTE — TELEPHONE ENCOUNTER
----- Message from Enrrique Carpenter MD sent at 12/26/2023  2:11 PM EST -----  Please let patient know that her calcium score test looks good. There was no calcium in her heart arteries

## 2024-01-03 ENCOUNTER — HOSPITAL ENCOUNTER (OUTPATIENT)
Dept: NON INVASIVE DIAGNOSTICS | Facility: CLINIC | Age: 44
Discharge: HOME | End: 2024-01-03
Payer: COMMERCIAL

## 2024-01-03 ENCOUNTER — TELEPHONE (OUTPATIENT)
Dept: CARDIOLOGY CLINIC | Facility: CLINIC | Age: 44
End: 2024-01-03

## 2024-01-03 ENCOUNTER — CLINICAL SUPPORT (OUTPATIENT)
Dept: CARDIOLOGY CLINIC | Facility: CLINIC | Age: 44
End: 2024-01-03
Payer: COMMERCIAL

## 2024-01-03 VITALS
WEIGHT: 198 LBS | OXYGEN SATURATION: 99 % | BODY MASS INDEX: 31.08 KG/M2 | DIASTOLIC BLOOD PRESSURE: 100 MMHG | HEART RATE: 62 BPM | HEIGHT: 67 IN | SYSTOLIC BLOOD PRESSURE: 164 MMHG

## 2024-01-03 DIAGNOSIS — I49.3 PVC (PREMATURE VENTRICULAR CONTRACTION): ICD-10-CM

## 2024-01-03 DIAGNOSIS — Z82.49 FAMILY HISTORY OF HEART DISEASE IN MALE FAMILY MEMBER BEFORE AGE 55: ICD-10-CM

## 2024-01-03 LAB
CHEST PAIN STATEMENT: NORMAL
MAX DIASTOLIC BP: 84 MMHG
MAX HR PERCENT: 88 %
MAX HR: 155 BPM
MAX PREDICTED HEART RATE: 176 BPM
PROTOCOL NAME: NORMAL
RATE PRESSURE PRODUCT: NORMAL
SL CV STRESS RECOVERY BP: NORMAL MMHG
SL CV STRESS RECOVERY HR: 96 BPM
SL CV STRESS RECOVERY O2 SAT: 98 %
SL CV STRESS STAGE REACHED: 4
STRESS ANGINA INDEX: 0
STRESS BASELINE BP: NORMAL MMHG
STRESS BASELINE HR: 62 BPM
STRESS O2 SAT REST: 99 %
STRESS PEAK HR: 155 BPM
STRESS POST ESTIMATED WORKLOAD: 13.4 METS
STRESS POST EXERCISE DUR MIN: 11 MIN
STRESS POST EXERCISE DUR MIN: 11 MIN
STRESS POST EXERCISE DUR SEC: 0 SEC
STRESS POST O2 SAT PEAK: 97 %
STRESS POST PEAK BP: 214 MMHG
STRESS POST PEAK HR: 155 BPM
STRESS POST PEAK SYSTOLIC BP: 214 MMHG
TARGET HR FORMULA: NORMAL
TEST INDICATION: NORMAL

## 2024-01-03 PROCEDURE — 93016 CV STRESS TEST SUPVJ ONLY: CPT | Performed by: INTERNAL MEDICINE

## 2024-01-03 PROCEDURE — 93228 REMOTE 30 DAY ECG REV/REPORT: CPT | Performed by: INTERNAL MEDICINE

## 2024-01-03 PROCEDURE — 93018 CV STRESS TEST I&R ONLY: CPT | Performed by: INTERNAL MEDICINE

## 2024-01-03 PROCEDURE — 93017 CV STRESS TEST TRACING ONLY: CPT

## 2024-01-03 NOTE — TELEPHONE ENCOUNTER
"----- Message from Roscoe Cornejo MD sent at 1/3/2024 12:54 PM EST -----  Preliminary Findings  Signature Date  20 events were transmitted. 1 patient triggered; 19 auto triggered  Patient monitored for 11d 20h 33m  PACs identified but counts removed due to inability to verify counts with ECG  60,676 PVCs with PVC burden of 7%    The patient was monitored for almost 12 days.  Primary rhythm was normal sinus.  High heart rate was 120 bpm-sinus tachycardia.  Low heart rate was 42 bpm-sinus bradycardia.  Average heart rate was 65 bpm.    When the patient noted \"moderate activity\" the rhythm was normal sinus at a rate of 71.    There were a large number of PVCs (7%).  These PVCs appear to be primarily unifocal although there was more than 1 focus.  There were occasional brief episodes of ventricular bigeminy.  There were rare ventricular couplets..  Most of the PVCs appear to be interpolated no higher grade ventricular ectopics were seen.    There were rare PACs.    Conclusions:    1.  Frequent PVCs but no higher grade rhythm abnormality noted.  "

## 2024-01-04 ENCOUNTER — TELEPHONE (OUTPATIENT)
Dept: CARDIOLOGY CLINIC | Facility: CLINIC | Age: 44
End: 2024-01-04

## 2024-01-04 NOTE — TELEPHONE ENCOUNTER
----- Message from James Nolasco PA-C sent at 1/4/2024  3:44 PM EST -----  Please call patient to advise her echo stress test overall looks okay.  These results can be discussed in more detail at next office visit, thank you!

## 2024-01-19 ENCOUNTER — OFFICE VISIT (OUTPATIENT)
Dept: CARDIOLOGY CLINIC | Facility: CLINIC | Age: 44
End: 2024-01-19
Payer: COMMERCIAL

## 2024-01-19 VITALS
OXYGEN SATURATION: 98 % | WEIGHT: 197 LBS | HEART RATE: 65 BPM | DIASTOLIC BLOOD PRESSURE: 72 MMHG | RESPIRATION RATE: 16 BRPM | HEIGHT: 67 IN | SYSTOLIC BLOOD PRESSURE: 110 MMHG | BODY MASS INDEX: 30.92 KG/M2

## 2024-01-19 DIAGNOSIS — I49.3 PVC (PREMATURE VENTRICULAR CONTRACTION): Primary | ICD-10-CM

## 2024-01-19 DIAGNOSIS — I10 PRIMARY HYPERTENSION: ICD-10-CM

## 2024-01-19 DIAGNOSIS — Z82.49 FAMILY HISTORY OF HEART DISEASE IN MALE FAMILY MEMBER BEFORE AGE 55: ICD-10-CM

## 2024-01-19 PROCEDURE — 99214 OFFICE O/P EST MOD 30 MIN: CPT

## 2024-01-19 RX ORDER — ATENOLOL 25 MG/1
25 TABLET ORAL DAILY
Qty: 90 TABLET | Refills: 3 | Status: SHIPPED | OUTPATIENT
Start: 2024-01-19 | End: 2024-01-24

## 2024-01-19 NOTE — PROGRESS NOTES
Diagnoses and all orders for this visit:    Encounter for gynecological examination without abnormal finding    Encounter for screening mammogram for malignant neoplasm of breast  -     Mammo screening bilateral w 3d & cad; Future    Colon cancer screening  -     Ambulatory referral to Gastroenterology; Future        Perineal hygiene reviewed   Weight bearing exercises minium of 150 mins/weekly advised.   Kegel exercises recommended daily, see AVS for instructions and recommendations  SBE encouraged, ASCCP guidelines reviewed. Condoms encouraged with all sexual activity to prevent STI's.   Gardisil vaccines recommended up to age 45  Calcium/ Vit D dietary requirements discussed,   Advised to call with any issues,  all concerns & questions addressed.   See provided information in your after visit summary     F/U Annually and PRN      Health Maintenance:    Last PAP: 2022  Negative HPV Negative   Next PAP Due:    Last Mammogram: 2023  with additional right diagnostic & US, return to routine screening   Life time Re Olivera % 9.53, Density B scattered areas of fibroglandular density , Bi-Rads 2 Benign  Next Mammogram: order given    Last Colonoscopy: Script given to patient at today's visit .    Gardisil:  Not completed       Subjective    CC: Yearly Exam      Marcelina Schwarz is a 44 y.o. female here for an annual exam.   GYN hx includes:  4 vaginal deliveries   No personal Hx of breast, cervical, ovarian or colon CA.   Family hx of:  No GYN cancers  Medically stable, reports no changes in medical Hx, follows with PMD    Patient's last menstrual period was 2024 (exact date). Starting to change in duration and flow, starting with night sweats and some difficulty sleeping  Her menstrual cycles are regular every 28-30 days. She denies issues with bleeding during her menses.   Denies history of abnormal pap smear.  She denies breast concerns, abnormal vaginal discharge, vaginal itching,  odor, irritation, bowel/bladder dysfunction, urinary symptoms, pelvic pain, or dyspareunia today.   She is sexually active. Monogamous relationship.  Her current method of contraception includes  male vasectomy . Denies any issues with her BCM.  She does not want STD testing today.  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     Past Medical History:   Diagnosis Date    Migraine     Varicella      Past Surgical History:   Procedure Laterality Date    ANTERIOR CRUCIATE LIGAMENT REPAIR Right     [place] ARTHRS KNE SURG W/MENISCECTOMY MED/LAT W/SHVG Right 7/1/2022    Procedure: ARTHROSCOPY KNEE- Right Knee Subtotal medial partial lateral meniscectomy, chondroplasty medial femoral condyle and patella, loose body medial.;  Surgeon: Herminio Garcia DO;  Location[facility];  Service: Orthopedics    VAGINAL DELIVERY      x4       Immunization History   Administered Date(s) Administered    COVID-19 PFIZER VACCINE 0.3 ML IM 08/13/2021, 09/03/2021    INFLUENZA 12/01/2015    Influenza Injectable, MDCK, Preservative Free, Quadrivalent, 0.5 mL 11/05/2019    Influenza, injectable, quadrivalent, preservative free 0.5 mL 10/26/2020    Influenza, seasonal, injectable, preservative free 12/01/2015    MMR 07/10/2016    Tdap 03/24/2016       Family History   Problem Relation Age of Onset    No Known Problems Mother     Cancer Father         urinary bladder - unspecified site    Hypertension Father     Heart block Father     No Known Problems Sister     No Known Problems Daughter     No Known Problems Son     No Known Problems Son     No Known Problems Son     Diabetes Maternal Grandmother         type 2 - diet controlled    Arthritis Maternal Grandmother     Asthma Maternal Grandmother     Heart disease Maternal Grandmother     Hypertension Maternal Grandmother     Heart disease Maternal Grandfather     Hypertension Maternal Grandfather     Depression  "Paternal Grandmother     Hypertension Paternal Grandmother     Hypertension Paternal Grandfather     Heart block Paternal Grandfather     Heart disease Paternal Grandfather         ill-defined    Early death Maternal Aunt     Heart disease Maternal Aunt     No Known Problems Maternal Aunt     No Known Problems Maternal Aunt     No Known Problems Paternal Aunt     Breast cancer Neg Hx      Social History     Tobacco Use    Smoking status: Never    Smokeless tobacco: Never   Vaping Use    Vaping status: Never Used   Substance Use Topics    Alcohol use: Not Currently    Drug use: Never       Current Outpatient Medications:     Multiple Vitamins-Minerals (multivitamin with minerals) tablet, Take 1 tablet by mouth daily, Disp: , Rfl:   Patient Active Problem List    Diagnosis Date Noted    Family history of heart disease in male family member before age 55 2023    Preoperative examination 2022    Elevated blood pressure reading 2022    Internal derangement of right knee 2022    Breast nodule 2022    Carpal tunnel syndrome of right wrist 2020    Triceps tendinitis 2019       Allergies   Allergen Reactions    Pollen Extract Itching       OB History    Para Term  AB Living   4 4 4 0 0 4   SAB IAB Ectopic Multiple Live Births   0 0 0 0 4      # Outcome Date GA Lbr Christopher/2nd Weight Sex Delivery Anes PTL Lv   4 Term 16 41w1d / 00:02 3725 g (8 lb 3.4 oz) F Vag-Spont EPI N MADI      Birth Comments: head circumference per SBAR of charting on Baby Girl [place]   3 Term 10/10/14    M Vag-Spont   MADI   2 Term 07/15/13    M    MADI   1 Term 10/01/11    M    MADI      Obstetric Comments   Menarche: 14 years old.        Vitals:    24 1004   BP: 134/80   BP Location: Left arm   Patient Position: Sitting   Cuff Size: Large   Weight: 90.7 kg (200 lb)   Height: 5' 7\" (1.702 m)     Body mass index is 31.32 kg/m².    Review of Systems     Constitutional: Negative for chills, " fatigue, fever, headaches, visual disturbances, and unexpected weight change.   Respiratory: Negative for cough, & shortness of breath.  Cardiovascular: Negative for chest pain. .    Gastrointestinal: Negative for Abd pain, nausea & vomiting, constipation and diarrhea.   Genitourinary: Negative for difficulty urinating, dysuria, hematuria, dyspareunia, unusual vaginal bleeding or discharge  Skin: Negative skin changes    Physical Exam     Constitutional: Alert & Oriented x3, well-developed and well-nourished. No distress.   HENT: Atraumatic, Normocephalic, Conjunctivae clear  Neck: Normal range of motion. Neck supple. No thyromegaly, mass, nodules or tenderness  Pulmonary: Effort normal.   Abdominal: Soft. No tenderness or masses  Musculoskeletal: Normal ROM  Skin: Warm & Dry  Psychological: Normal mood, thought content, behavior & judgement     Breasts:   Right: tissue soft without masses, tenderness, skin changes or nipple discharge. No areas of erythema or pain. No subclavicular, axillary, pectoral adenopathy  Left:  tissue soft without masses, tenderness, skin changes or nipple discharge. No areas of erythema or pain. No subclavicular, axillary, pectoral adenopathy    Pelvic exam was performed with patient supine, lithotomy position.      Labia: Negative rash, tenderness, lesion or injury on the right labia.              Negative rash, tenderness, lesion or injury on the left labia.   Urethral meatus:  Negative for  tenderness, inflammation or discharge.   Uterus: not deviated, enlarged, fixed or tender.   Cervix: No CMT, no discharge or friability.   Right adnexa: no mass, no tenderness and no fullness.  Left adnexa: no mass, no tenderness and no fullness.   Vagina: No erythema, tenderness, masses, or foreign body in the vagina. No signs of injury around the vagina. No unusual vaginal discharge   Perineum without lesions, signs of injury, erythema or swelling.  Inguinal Canal:        Right: No inguinal  adenopathy or hernia present.        Left: No inguinal adenopathy or hernia present.

## 2024-01-19 NOTE — PROGRESS NOTES
[facility] [place]  [address]  [place] [address]  Cardiology Office Note    Marcelina Schwarz 44 y.o. female MRN: [id number]    24          Assessment/Plan:  1. PVCs  Noted 7% PVC burden on event monitor while on atenolol  Patient asymptomatic  Stress test negative for ischemia  TTE shows EF 50-55%  Recommend maintaining surveillance with event monitor and TTE in 6-12 months; discussed with EP  Consider EP evaluation if symptoms develop, there is a change in EF on TTE or if PVC burden significant increases.   Patient to notfy office if she develops symptoms  Continue atenolol 25 mg daily, hold off on increasing at this time due to soft BP/HR 60s    2. Hypertension  Continue atenolol 25 mg daily    3. Significant family hx CAD  Patient denies chest pain or anginal equivalent  Stress test negative for ischemia  Calcium score 0    Follow up: 6 months or sooner as needed  All questions and concerns addressed.  Patient was advised to report any problems requiring medical attention.          1. PVC (premature ventricular contraction)        2. Primary hypertension  atenolol (TENORMIN) 25 mg tablet      3. Family history of heart disease in male family member before age 55            HPI: Marcelina Schwarz is a 44 y.o. year old female with PMH hypertension who presents for follow-up. Patient is known to Dr. Carpenter.    Patient established care with cardiology for significant family history of CAD and CHF. Patient had stents placed in his 50s and her maternal grand father had a dilated heart and  in his 50s. Similar in her cousins.     Patient was evaluated with exercise stress test, event monitor, TTE, and CT coronary calcium score.  Patient is exercise stress test was negative for ischemia.  Her calcium score is 0.  Her TTE showed EF 50 to 55%, no regional wall motion abnormality, mildly dilated left atrium.  Patient had PVCs so she had event monitor which showed 7% PVC  "burden no high-grade rhythm abnormalities noted.    Patient denies any chest pain, shortness of breath, or palpitations.  She notes that she was taking atenolol during the time of her event monitor which was recently started by her PCP for hypertension.     Discussed further surveillance and offered referral to EP. Patient is comfortable with monitoring at this time.      Patient was instructed to call the office or seek medical attention if any significant chest pain, shortness of breath, palpitations, or lower extremity swelling develop. All medications reviewed and patient is tolerating medications without side effects.     Social history:   Patient denies tobacco, significant alcohol, or recreational drug use.    AMB extended holter monitor 1/3/24  20 events were transmitted. 1 patient triggered; 19 auto triggered  Patient monitored for 11d 20h 33m  PACs identified but counts removed due to inability to verify counts with ECG  60,676 PVCs with PVC burden of 7%     The patient was monitored for almost 12 days.  Primary rhythm was normal sinus.  High heart rate was 120 bpm-sinus tachycardia.  Low heart rate was 42 bpm-sinus bradycardia.  Average heart rate was 65 bpm.     When the patient noted \"moderate activity\" the rhythm was normal sinus at a rate of 71.     There were a large number of PVCs (7%).  These PVCs appear to be primarily unifocal although there was more than 1 focus.  There were occasional brief episodes of ventricular bigeminy.  There were rare ventricular couplets..  Most of the PVCs appear to be interpolated no higher grade ventricular ectopics were seen.     There were rare PACs.     Conclusions:     1.  Frequent PVCs but no higher grade rhythm abnormality noted.        Patient Active Problem List   Diagnosis    Triceps tendinitis    Carpal tunnel syndrome of right wrist    Breast nodule    Internal derangement of right knee    Preoperative examination    Elevated blood pressure reading    " Family history of heart disease in male family member before age 55       Allergies   Allergen Reactions    Pollen Extract Itching         Current Outpatient Medications:     atenolol (TENORMIN) 25 mg tablet, Take 1 tablet (25 mg total) by mouth daily, Disp: 90 tablet, Rfl: 3    Multiple Vitamins-Minerals (multivitamin with minerals) tablet, Take 1 tablet by mouth daily, Disp: , Rfl:     Past Medical History:   Diagnosis Date    Migraine     Varicella        Family History   Problem Relation Age of Onset    No Known Problems Mother     Cancer Father         urinary bladder - unspecified site    Hypertension Father     Heart block Father     No Known Problems Sister     No Known Problems Daughter     Diabetes Maternal Grandmother         type 2 - diet controlled    Arthritis Maternal Grandmother     Asthma Maternal Grandmother     Heart disease Maternal Grandmother     Hypertension Maternal Grandmother     Heart disease Maternal Grandfather     Hypertension Maternal Grandfather     Depression Paternal Grandmother     Hypertension Paternal Grandmother     Hypertension Paternal Grandfather     Heart block Paternal Grandfather     Heart disease Paternal Grandfather         ill-defined    Early death Maternal Aunt     Heart disease Maternal Aunt     No Known Problems Maternal Aunt     No Known Problems Maternal Aunt     No Known Problems Paternal Aunt     Breast cancer Neg Hx        Past Surgical History:   Procedure Laterality Date    ANTERIOR CRUCIATE LIGAMENT REPAIR Right     [facility] KNE SURG W/MENISCECTOMY MED/LAT W/SHVG Right 7/1/2022    Procedure: ARTHROSCOPY KNEE- Right Knee Subtotal medial partial lateral meniscectomy, chondroplasty medial femoral condyle and patella, loose body medial.;  Surgeon: Herminio Garcia DO;  Location: [facility];  Service: Orthopedics    VAGINAL DELIVERY      x4       Social History     Socioeconomic History    Marital status: /Civil Union     Spouse name: Not on file     "Number of children: Not on file    Years of education: Not on file    Highest education level: Not on file   Occupational History    Not on file   Tobacco Use    Smoking status: Never    Smokeless tobacco: Never   Vaping Use    Vaping status: Never Used   Substance and Sexual Activity    Alcohol use: Not Currently    Drug use: Never    Sexual activity: Yes     Partners: Male     Birth control/protection: Male Sterilization   Other Topics Concern    Not on file   Social History Narrative    Always uses seat belt     Social Determinants of Health     Financial Resource Strain: Not on file   Food Insecurity: Not on file   Transportation Needs: Not on file   Physical Activity: Not on file   Stress: Not on file   Social Connections: Not on file   Intimate Partner Violence: Not on file   Housing Stability: Not on file       Review of symptoms:   Review of Systems   Constitutional:  Negative for chills, diaphoresis and fever.   Respiratory:  Negative for cough, chest tightness and shortness of breath.    Cardiovascular:  Negative for chest pain, palpitations and leg swelling.   Gastrointestinal:  Negative for abdominal distention, blood in stool, nausea and vomiting.   Genitourinary:  Negative for difficulty urinating.   Musculoskeletal:  Negative for arthralgias and back pain.   Neurological:  Negative for dizziness, syncope, light-headedness and headaches.   Psychiatric/Behavioral:  Negative for agitation and confusion. The patient is not nervous/anxious.         Vitals: /72 (BP Location: Left arm, Patient Position: Sitting, Cuff Size: Standard)   Pulse 65   Resp 16   Ht 5' 7\" (1.702 m)   Wt 89.4 kg (197 lb)   SpO2 98%   BMI 30.85 kg/m²         Physical Exam:     Physical Exam  Vitals and nursing note reviewed.   Constitutional:       General: She is not in acute distress.     Appearance: She is well-developed.   HENT:      Head: Normocephalic and atraumatic.   Eyes:      Conjunctiva/sclera: Conjunctivae " normal.   Neck:      Vascular: No carotid bruit.   Cardiovascular:      Rate and Rhythm: Normal rate and regular rhythm.      Heart sounds: Normal heart sounds. No murmur heard.  Pulmonary:      Effort: Pulmonary effort is normal. No respiratory distress.      Breath sounds: Normal breath sounds.   Abdominal:      Palpations: Abdomen is soft.      Tenderness: There is no abdominal tenderness.   Musculoskeletal:         General: No swelling.      Cervical back: Neck supple.      Right lower leg: No edema.      Left lower leg: No edema.   Skin:     General: Skin is warm and dry.      Capillary Refill: Capillary refill takes less than 2 seconds.   Neurological:      Mental Status: She is alert and oriented to person, place, and time.   Psychiatric:         Mood and Affect: Mood normal.            Thank you for allowing me to participate in the care and evaluation of your patient.  Should you have any questions, please feel free to contact me.

## 2024-01-19 NOTE — PATIENT INSTRUCTIONS
Breast Self Exam for Women   AMBULATORY CARE:   A breast self-exam (BSE)  is a way to check your breasts for lumps and other changes. Regular BSEs can help you know how your breasts normally look and feel. Most breast lumps or changes are not cancer, but you should always have them checked by a healthcare provider.  Why you should do a BSE:  Breast cancer is the most common type of cancer in women. Even if you have mammograms, you may still want to do a BSE regularly. If you know how your breasts normally feel and look, it may help you know when to contact your healthcare provider. Mammograms can miss some cancers. You may find a lump during a BSE that did not show up on a mammogram.  When you should do a BSE:  If you have periods, you may want to do your BSE 1 week after your period ends. This is the time when your breasts may be the least swollen, lumpy, or tender. You can do regular BSEs even if you are breastfeeding or have breast implants.  Call your doctor if:   You find any lumps or changes in your breasts.    You have breast pain or fluid coming from your nipples.    You have questions or concerns about your condition or care.    How to do a BSE:       Look at your breasts in a mirror.  Look at the size and shape of each breast and nipple. Check for swelling, lumps, dimpling, scaly skin, or other skin changes. Look for nipple changes, such as a nipple that is painful or beginning to pull inward. Gently squeeze both nipples and check to see if fluid (that is not breast milk) comes out of them. If you find any of these or other breast changes, contact your healthcare provider. Check your breasts while you sit or  the following 3 positions:    Hang your arms down at your sides.    Raise your hands and join them behind your head.    Put firm pressure with your hands on your hips. Bend slightly forward while you look at your breasts in the mirror.    Lie down and feel your breasts.  When you lie down,  your breast tissue spreads out evenly over your chest. This makes it easier for you to feel for lumps and anything that may not be normal for your breasts. Do a BSE on one breast at a time.    Place a small pillow or towel under your left shoulder.  Put your left arm behind your head.    Use the 3 middle fingers of your right hand.  Use your fingertip pads, on the top of your fingers. Your fingertip pad is the most sensitive part of your finger.    Use small circles to feel your breast tissue.  Use your fingertip pads to make dime-sized, overlapping circles on your breast and armpits. Use light, medium, and firm pressure. First, press lightly. Second, press with medium pressure to feel a little deeper into the breast. Last, use firm pressure to feel deep within your breast.    Examine your entire breast area.  Examine the breast area from above the breast to below the breast where you feel only ribs. Make small circles with your fingertips, starting in the middle of your armpit. Make circles going up and down the breast area. Continue toward your breast and all the way across it. Examine the area from your armpit all the way over to the middle of your chest (breastbone). Stop at the middle of your chest.    Move the pillow or towel to your right shoulder, and put your right arm behind your head.  Use the 3 fingertip pads of your left hand, and repeat the above steps to do a BSE on your right breast.  What else you can do to check for breast problems or cancer:  Talk to your healthcare provider about mammograms. A mammogram is an x-ray of your breasts to screen for breast cancer or other problems. Your provider can tell you the benefits and risks of mammograms. The first mammogram is usually at age 45 or 50. Your provider may recommend you start at 40 or younger if your risk for breast cancer is high. Mammograms usually continue every 1 to 2 years until age 74.       Follow up with your doctor as directed:  Write  down your questions so you remember to ask them during your visits.  © Copyright [employer] 2023 Information is for End User's use only and may not be sold, redistributed or otherwise used for commercial purposes.  The above information is an  only. It is not intended as medical advice for individual conditions or treatments. Talk to your doctor, nurse or pharmacist before following any medical regimen to see if it is safe and effective for you.  Wellness Visit for Adults   AMBULATORY CARE:   A wellness visit  is when you see your healthcare provider to get screened for health problems. Your healthcare provider will also give you advice on how to stay healthy. Write down your questions so you remember to ask them. Ask your healthcare provider how often you should have a wellness visit.  What happens at a wellness visit:  Your healthcare provider will ask about your health, and your family history of health problems. This includes high blood pressure, heart disease, and cancer. He or she will ask if you have symptoms that concern you, if you smoke, and about your mood. You may also be asked about your intake of medicines, supplements, food, and alcohol. Any of the following may be done:  Your weight  will be checked. Your height may also be checked so your body mass index (BMI) can be calculated. Your BMI shows if you are at a healthy weight.    Your blood pressure  and heart rate will be checked. Your temperature may also be checked.    Blood and urine tests  may be done. Blood tests may be done to check your cholesterol levels. Abnormal cholesterol levels increase your risk for heart disease and stroke. You may also need a blood or urine test to check for diabetes if you are at increased risk. Urine tests may be done to look for signs of an infection or kidney disease.    A physical exam  includes checking your heartbeat and lungs with a stethoscope. Your healthcare provider may also check your skin to  look for sun damage.    Screening tests  may be recommended. A screening test is done to check for diseases that may not cause symptoms. The screening tests you may need depend on your age, gender, family history, and lifestyle habits. For example, colorectal screening may be recommended if you are 50 years old or older.    Screening tests you need if you are a woman:   A Pap smear  is used to screen for cervical cancer. Pap smears are usually done every 3 to 5 years depending on your age. You may need them more often if you have had abnormal Pap smear test results in the past. Ask your healthcare provider how often you should have a Pap smear.    A mammogram  is an x-ray of your breasts to screen for breast cancer. Experts recommend mammograms every 2 years starting at age 50 years. You may need a mammogram at age 49 years or younger if you have an increased risk for breast cancer. Talk to your healthcare provider about when you should start having mammograms and how often you need them.    Vaccines you may need:   Get an influenza vaccine  every year. The influenza vaccine protects you from the flu. Several types of viruses cause the flu. The viruses change over time, so new vaccines are made each year.    Get a tetanus-diphtheria (Td) booster vaccine  every 10 years. This vaccine protects you against tetanus and diphtheria. Tetanus is a severe infection that may cause painful muscle spasms and lockjaw. Diphtheria is a severe bacterial infection that causes a thick covering in the back of your mouth and throat.    Get a human papillomavirus (HPV) vaccine  if you are female and aged 19 to 26 or male 19 to 21 and never received it. This vaccine protects you from HPV infection. HPV is the most common infection spread by sexual contact. HPV may also cause vaginal, penile, and anal cancers.    Get a pneumococcal vaccine  if you are aged 65 years or older. The pneumococcal vaccine is an injection given to protect you  from pneumococcal disease. Pneumococcal disease is an infection caused by pneumococcal bacteria. The infection may cause pneumonia, meningitis, or an ear infection.    Get a shingles vaccine  if you are 60 or older, even if you have had shingles before. The shingles vaccine is an injection to protect you from the varicella-zoster virus. This is the same virus that causes chickenpox. Shingles is a painful rash that develops in people who had chickenpox or have been exposed to the virus.    How to eat healthy:  My Plate is a model for planning healthy meals. It shows the types and amounts of foods that should go on your plate. Fruits and vegetables make up about half of your plate, and grains and protein make up the other half. A serving of dairy is included on the side of your plate. The amount of calories and serving sizes you need depends on your age, gender, weight, and height. Examples of healthy foods are listed below:  Eat a variety of vegetables  such as dark green, red, and orange vegetables. You can also include canned vegetables low in sodium (salt) and frozen vegetables without added butter or sauces.    Eat a variety of fresh fruits , canned fruit in 100% juice, frozen fruit, and dried fruit.    Include whole grains.  At least half of the grains you eat should be whole grains. Examples include whole-wheat bread, wheat pasta, brown rice, and whole-grain cereals such as oatmeal.    Eat a variety of protein foods such as seafood (fish and shellfish), lean meat, and poultry without skin (turkey and chicken). Examples of lean meats include pork leg, shoulder, or tenderloin, and beef round, sirloin, tenderloin, and extra lean ground beef. Other protein foods include eggs and egg substitutes, beans, peas, soy products, nuts, and seeds.    Choose low-fat dairy products such as skim or 1% milk or low-fat yogurt, cheese, and cottage cheese.    Limit unhealthy fats  such as butter, hard margarine, and shortening.        Exercise:  Exercise at least 30 minutes per day on most days of the week. Some examples of exercise include walking, biking, dancing, and swimming. You can also fit in more physical activity by taking the stairs instead of the elevator or parking farther away from stores. Include muscle strengthening activities 2 days each week. Regular exercise provides many health benefits. It helps you manage your weight, and decreases your risk for type 2 diabetes, heart disease, stroke, and high blood pressure. Exercise can also help improve your mood. Ask your healthcare provider about the best exercise plan for you.       General health and safety guidelines:   Do not smoke.  Nicotine and other chemicals in cigarettes and cigars can cause lung damage. Ask your healthcare provider for information if you currently smoke and need help to quit. E-cigarettes or smokeless tobacco still contain nicotine. Talk to your healthcare provider before you use these products.    Limit alcohol.  A drink of alcohol is 12 ounces of beer, 5 ounces of wine, or 1½ ounces of liquor.    Lose weight, if needed.  Being overweight increases your risk of certain health conditions. These include heart disease, high blood pressure, type 2 diabetes, and certain types of cancer.    Protect your skin.  Do not sunbathe or use tanning beds. Use sunscreen with a SPF 15 or higher. Apply sunscreen at least 15 minutes before you go outside. Reapply sunscreen every 2 hours. Wear protective clothing, hats, and sunglasses when you are outside.    Drive safely.  Always wear your seatbelt. Make sure everyone in your car wears a seatbelt. A seatbelt can save your life if you are in an accident. Do not use your cell phone when you are driving. This could distract you and cause an accident. Pull over if you need to make a call or send a text message.    Practice safe sex.  Use latex condoms if are sexually active and have more than one partner. Your healthcare  provider may recommend screening tests for sexually transmitted infections (STIs).    Wear helmets, lifejackets, and protective gear.  Always wear a helmet when you ride a bike or motorcycle, go skiing, or play sports that could cause a head injury. Wear protective equipment when you play sports. Wear a lifejacket when you are on a boat or doing water sports.    © Copyright [employer] 2023 Information is for End User's use only and may not be sold, redistributed or otherwise used for commercial purposes.  The above information is an  only. It is not intended as medical advice for individual conditions or treatments. Talk to your doctor, nurse or pharmacist before following any medical regimen to see if it is safe and effective for you.  Kegel Exercises for Women   AMBULATORY CARE:   Kegel exercises  help strengthen your pelvic muscles. Pelvic muscles hold your pelvic organs, such as your bladder and uterus, in place. Kegel exercises help prevent or control certain conditions, such as urine incontinence (leakage) or uterine prolapse.       Call your doctor or physical therapist if:   You cannot feel your muscles tighten or relax.    You continue to leak urine.    You have questions or concerns about your condition or care.    Use the correct muscles:  Pelvic muscles are the muscles you use to control urine flow. To target these muscles, stop and start the flow of urine several times. This will help you become familiar with how it feels to tighten and relax these muscles.  How to do Kegel exercises:   Get into a comfortable position.  You may lie down, stand up, or sit down to do these exercises. When you first try to do these exercises, it may be easier if you lie down.    Tighten or squeeze your pelvic muscles slowly.  It may feel like you are trying to hold back urine or gas. Hold this position for 3 seconds. Relax for 3 seconds. Repeat this cycle 10 times. Do not hold your breath when you do Kegel  exercises. Keep your stomach, back, and leg muscles relaxed.    Do 10 sets of Kegel exercises, at least 3 times a day.  When you know how to do Kegel exercises, use different positions. This will help to strengthen your pelvic muscles as much as possible. You can do these exercises while you lie on the floor, watch TV, or while you stand. Tighten your pelvic muscles before you sneeze, cough, or lift to prevent urine leakage. You may notice improved bladder control within about 6 weeks.    Follow up with your doctor or physical therapist as directed:  Write down your questions so you remember to ask them during your visits.  © Copyright [employer] 2023 Information is for End User's use only and may not be sold, redistributed or otherwise used for commercial purposes.  The above information is an  only. It is not intended as medical advice for individual conditions or treatments. Talk to your doctor, nurse or pharmacist before following any medical regimen to see if it is safe and effective for you.       Perineal Hygiene      Your vaginal naturally takes care of its self, it is a self washing system, the less you mess the healthier it will be     No soaps or feminine wash to the vulva, these products can cause dermitis, bacterial infections and other vulvar problems.   Use only water to cleanse, or water with Dove or Dove Sensitive Skin Bar soap if necessary.    No scented lotions or products are advised in or near your vulva.    Use only coconut oil for moisture if needed.  No douching this may cause imbalance in your vaginal PH and further issues.    If you wear panty liners, you may apply a thin coating of Vaseline, A&D ointment or coconut oil to the vulvar tissues as a skin barrier     Cotton underware, loose fitting clothing  Only perfume-free, dye-free laundry detergent, use a second rinse cycle   Avoid fabric softeners/dryer sheets.       Your partner should avoid the same products as well.        Over the counter probiotic to restore vaginal cindy may be helpful as well, take daily.       You may also look into Boric Acid vaginal suppositories to restore vaginal PH balance for up to 2 weeks as directed on the box. You may not use these if you are pregnant      For vaginal dryness:      You may use:     Coconut oil (organic, pure, unscented) as needed for moisture or lubrication. ( Do not use if allergic)       Replens moisture restore external comfort gel daily ( use as directed on the box)        Replens long lasting vaginal moisturizer  ( use as directed on the box)         For Vaginal Lubrication:          You may use:     Coconut oil (organic, pure, unscented) as a lubricant or another scent-free lubricant (Astroglide, Uberlube) if needed.  Do not use coconut oil or silicone if using a condom as this may break down the integrity of the condom and cause an unplanned pregnancy              Do not use coconut oil if allergic               Replens silky smooth lubricant, premium silicone based lubricant for intercourse. ( use as directed, a small amount will provide an enhanced natural feeling)     Any premium over the counter vaginal lubricant water or silicone based. Silicone based will have more staying power.    Menopause   WHAT YOU NEED TO KNOW:   What is menopause?  Menopause is a normal stage in a person's life when monthly periods stop. You are considered to be in menopause when you have not had a period for a full year after the age of 40. Menopause usually occurs between ages 47 to 53. Perimenopause is a stage before menopause that may cause signs and symptoms similar to menopause. Perimenopause may start about 4 years before menopause.       What causes menopause?  Menopause starts when the ovaries stop making the female hormones estrogen and progesterone. After menopause, you are no longer able to become pregnant. Any of the following may trigger menopause or early menopause:  Surgery, including  a hysterectomy or oophorectomy    Family history of early menopause    Smoking    Chemotherapy or pelvic radiation    Chromosome abnormalities, including Babcock syndrome and Fragile X syndrome    Premature ovarian insufficiency (the ovaries stop producing eggs before age 40)    What are the signs and symptoms of menopause?   Irregular menstrual cycles with heavy vaginal bleeding followed by decreased bleeding until it stops    Hot flashes (feeling warm, flushed, and sweaty)    Vaginal changes such as increased dryness    Mood changes such as anxiety, depression, or decreased desire to have sex    Trouble sleeping, joint pain, headaches    Brittle nails, hair on chin or chest where it is normally absent    Decrease in breast size and change in skin texture    Weight gain    How is menopause treated or managed?   Hormone replacement therapy (HRT) is medicine that replaces your low hormone levels.  HRT contains estrogen and sometimes progestin.    HRT has several benefits.  HRT helps prevent osteoporosis, which decreases your risk for bone fractures. HRT also protects you from colorectal cancer.    HRT also has some risks.  HRT increases your risk for breast cancer, blood clots, heart disease, a heart attack, or a stroke. If you are 65 years or older, HRT can also increase your risk for dementia. Your risk for uterine or endometrial cancer, gallbladder disease, and urinary incontinence is higher if you take estrogen-only HRT.    Manage hot flashes.  Hot flashes are brief periods of feeling very warm, flushed, and sweaty. Hot flashes can last from a few seconds to several minutes. They may happen many times during the day, and are common at night. Layer your clothing so that you can easily remove some clothing and cool yourself during a hot flash. Cold drinks may also be helpful. Non-hormone medicines can help relieve or prevent hot flashes. Examples include certain antidepressants, nerve medicines, and high blood  pressure medicines.    Reduce vaginal dryness by using over-the-counter vaginal creams.  Vaginal dryness may cause you to have pain or discomfort during sex. Only use creams that are made for vaginal use. Do  not  use petroleum jelly. You may put an estrogen cream in and around your vagina. Estrogen cream may help decrease vaginal dryness and lower your risk of vaginal infections.    Continue to use birth control during perimenopause if you do not want to get pregnant.  You may need to use birth control until it has been 1 year since your periods stopped. Ask your healthcare provider when you can stop using birth control to prevent pregnancy.    How can I live a healthy lifestyle during and after menopause?  After menopause, your risk for heart disease and bone loss increases. Ask about these and other ways to stay healthy:  Exercise regularly.  Exercise helps you maintain a healthy weight. Exercise can also help to control your blood pressure and cholesterol levels. Include weight-bearing exercise for strong bones. Weight bearing exercise is recommended for at least 30 minutes, 3 times a week. Ask your healthcare provider about the best exercise plan for you.         Eat a variety of healthy foods.  Include fruits, vegetables, whole grains (whole-wheat bread, pasta, and cereals), low-fat dairy, and lean protein foods (beans, poultry, and fish). Limit foods high in sodium (salt). Ask your healthcare provider for more information about a meal plan that is right for you.         Do not smoke.  If you smoke, it is never too late to quit. You are more likely to have a heart attack, lung disease, blood clots, and cancer if you smoke. Ask your healthcare provider for information if you need help quitting.    Take supplements as directed.  You may need extra calcium and vitamin D to help prevent osteoporosis.            Limit alcohol and caffeine.  Alcohol and caffeine may worsen your symptoms.    When should I call my  doctor?   You have vaginal bleeding after menopause.    You have questions or concerns about your condition or care.    CARE AGREEMENT:   You have the right to help plan your care. Learn about your health condition and how it may be treated. Discuss treatment options with your healthcare providers to decide what care you want to receive. You always have the right to refuse treatment. The above information is an  only. It is not intended as medical advice for individual conditions or treatments. Talk to your doctor, nurse or pharmacist before following any medical regimen to see if it is safe and effective for you.  © Copyright [employer] 2023 Information is for End User's use only and may not be sold, redistributed or otherwise used for commercial purposes.

## 2024-01-24 ENCOUNTER — ANNUAL EXAM (OUTPATIENT)
Dept: OBGYN CLINIC | Facility: CLINIC | Age: 44
End: 2024-01-24
Payer: COMMERCIAL

## 2024-01-24 VITALS
SYSTOLIC BLOOD PRESSURE: 134 MMHG | HEIGHT: 67 IN | BODY MASS INDEX: 31.39 KG/M2 | WEIGHT: 200 LBS | DIASTOLIC BLOOD PRESSURE: 80 MMHG

## 2024-01-24 DIAGNOSIS — Z12.31 ENCOUNTER FOR SCREENING MAMMOGRAM FOR MALIGNANT NEOPLASM OF BREAST: ICD-10-CM

## 2024-01-24 DIAGNOSIS — Z01.419 ENCOUNTER FOR GYNECOLOGICAL EXAMINATION WITHOUT ABNORMAL FINDING: Primary | ICD-10-CM

## 2024-01-24 DIAGNOSIS — Z12.11 COLON CANCER SCREENING: ICD-10-CM

## 2024-01-24 PROCEDURE — 99396 PREV VISIT EST AGE 40-64: CPT | Performed by: OBSTETRICS & GYNECOLOGY

## 2024-02-22 ENCOUNTER — TELEPHONE (OUTPATIENT)
Age: 44
End: 2024-02-22

## 2024-02-22 NOTE — TELEPHONE ENCOUNTER
Pt was calling to schedule a colon screening. I told pt since she is 44 she will need an OV first. Pt said she will wait a year and have it done after she is 45

## 2024-03-15 ENCOUNTER — HOSPITAL ENCOUNTER (OUTPATIENT)
Age: 44
Discharge: HOME | End: 2024-03-15
Payer: COMMERCIAL

## 2024-03-15 VITALS — WEIGHT: 200 LBS | HEIGHT: 67 IN | BODY MASS INDEX: 31.39 KG/M2

## 2024-03-15 DIAGNOSIS — Z12.31 ENCOUNTER FOR SCREENING MAMMOGRAM FOR MALIGNANT NEOPLASM OF BREAST: ICD-10-CM

## 2024-03-15 PROCEDURE — 77063 BREAST TOMOSYNTHESIS BI: CPT

## 2024-03-15 PROCEDURE — 77067 SCR MAMMO BI INCL CAD: CPT

## 2024-04-03 ENCOUNTER — OFFICE VISIT (OUTPATIENT)
Dept: CARDIOLOGY CLINIC | Facility: CLINIC | Age: 44
End: 2024-04-03
Payer: COMMERCIAL

## 2024-04-03 VITALS
HEIGHT: 67 IN | RESPIRATION RATE: 16 BRPM | BODY MASS INDEX: 30.29 KG/M2 | DIASTOLIC BLOOD PRESSURE: 80 MMHG | HEART RATE: 72 BPM | WEIGHT: 193 LBS | OXYGEN SATURATION: 97 % | SYSTOLIC BLOOD PRESSURE: 135 MMHG

## 2024-04-03 DIAGNOSIS — I49.3 PVC (PREMATURE VENTRICULAR CONTRACTION): Primary | ICD-10-CM

## 2024-04-03 PROCEDURE — 99214 OFFICE O/P EST MOD 30 MIN: CPT | Performed by: INTERNAL MEDICINE

## 2024-04-03 NOTE — PROGRESS NOTES
Cardiology Follow Up    Marcelina Schwarz  1980  [id number]  [facility] [place]  [address] [id number] [phone]    Discussion/Summary:  PVC  Borderline blood pressure  Family history of CAD      Will recheck cardiac event monitor to evaluate PVC burden while off betablockers  Monitor blood pressure at home. Recommend low salt DASH diet and increase exercise/activity  Cont ti cut down on coffee intake    She will let us know if she has any symptoms.    Recommend patient presents to the emergency room or call our office if he has any new/persistent or progressive symptoms.  Previous studies were reviewed.    Safety measures were reviewed.  Questions were entertained and answered.  Patient was advised to report any problems requiring medical attention.    Follow-up with PCP and appropriate specialist and lab work as discussed.    Return for follow up visit as scheduled or earlier, if needed.    Thank you for allowing me to participate in the care and evaluation of your patient.  Should you have any questions, please feel free to contact me.      History of Present Illness:     Marcelina Schwarz is a 44 y.o. female who presents for follow up for cardiovascular care.    Denies chest pain, chest pressure, shortness of breath, dizziness, lightheadedness, presyncope or syncope.  Denies orthopnea, PND or lower limb edema.    She stopped her betablockers in the past due to concern for weight gain.   Her last event monitor showd a PVC burden of 7% but she was on betablockers at the time    Amb 1/324:  20 events were transmitted. 1 patient triggered; 19 auto triggered   Patient monitored for 11d 20h 33m   PACs identified but counts removed due to inability to verify counts with ECG   60,676 PVCs with PVC burden of 7%    Stress test 12/23:  1.  Resting EKG showed frequent unifocal PVCs and monitor ST-T  abnormalities  2.  With the stress of exercise mild J-point depression was noted in 2 3 aVF with marked upslope.  This is a negative stress test at greater than 85% of predicted maximum heart rate  3.  PVCs appear to go away with exercise  4.  Good exercise tolerance-13.4 METS  5.  No chest discomfort with exercise      Echo 12/13/23:      Left Ventricle: Left ventricular cavity size is normal. Wall thickness is normal. The left ventricular ejection fraction is 50-55%. Systolic function is low normal. Wall motion is normal. Diastolic function is normal.    Left Atrium: The atrium is mildly dilated.    Occasional PVCs were noted. Consider rhythm monitoring if clinically indicated.      Patient Active Problem List   Diagnosis    Triceps tendinitis    Carpal tunnel syndrome of right wrist    Breast nodule    Internal derangement of right knee    Preoperative examination    Elevated blood pressure reading    Family history of heart disease in male family member before age 55     Past Medical History:   Diagnosis Date    Migraine     Varicella      Social History     Socioeconomic History    Marital status: /Civil Union     Spouse name: Not on file    Number of children: Not on file    Years of education: Not on file    Highest education level: Not on file   Occupational History    Not on file   Tobacco Use    Smoking status: Never    Smokeless tobacco: Never   Vaping Use    Vaping status: Never Used   Substance and Sexual Activity    Alcohol use: Not Currently    Drug use: Never    Sexual activity: Yes     Partners: Male     Birth control/protection: Male Sterilization   Other Topics Concern    Not on file   Social History Narrative    Always uses seat belt     Social Determinants of Health     Financial Resource Strain: Not on file   Food Insecurity: Not on file   Transportation Needs: Not on file   Physical Activity: Not on file   Stress: Not on file   Social Connections: Not on file   Intimate Partner  Violence: Not on file   Housing Stability: Not on file      Family History   Problem Relation Age of Onset    No Known Problems Mother     Cancer Father         urinary bladder - unspecified site    Hypertension Father     Heart block Father     No Known Problems Sister     No Known Problems Daughter     No Known Problems Son     No Known Problems Son     No Known Problems Son     Diabetes Maternal Grandmother         type 2 - diet controlled    Arthritis Maternal Grandmother     Asthma Maternal Grandmother     Heart disease Maternal Grandmother     Hypertension Maternal Grandmother     Heart disease Maternal Grandfather     Hypertension Maternal Grandfather     Depression Paternal Grandmother     Hypertension Paternal Grandmother     Hypertension Paternal Grandfather     Heart block Paternal Grandfather     Heart disease Paternal Grandfather         ill-defined    Early death Maternal Aunt     Heart disease Maternal Aunt     No Known Problems Maternal Aunt     No Known Problems Maternal Aunt     No Known Problems Paternal Aunt     Breast cancer Neg Hx      Past Surgical History:   Procedure Laterality Date    ANTERIOR CRUCIATE LIGAMENT REPAIR Right     IA ARTHRS KNE SURG W/MENISCECTOMY MED/LAT W/SHVG Right 7/1/2022    Procedure: ARTHROSCOPY KNEE- Right Knee Subtotal medial partial lateral meniscectomy, chondroplasty medial femoral condyle and patella, loose body medial.;  Surgeon: Herminio Garcia DO;  Location: [facility];  Service: Orthopedics    VAGINAL DELIVERY      x4       Current Outpatient Medications:     Multiple Vitamins-Minerals (multivitamin with minerals) tablet, Take 1 tablet by mouth daily, Disp: , Rfl:   Allergies   Allergen Reactions    Pollen Extract Itching       Labs:  Lab Results   Component Value Date    WBC 5.12 08/17/2023    HGB 13.8 08/17/2023    HCT 41.1 08/17/2023    MCV 87 08/17/2023     08/17/2023     Lab Results   Component Value Date    CALCIUM 9.1 08/17/2023    K 3.8  "08/17/2023    CO2 28 08/17/2023     08/17/2023    BUN 15 08/17/2023    CREATININE 1.04 08/17/2023     No results found for: \"HGBA1C\"  No results found for: \"CHOL\"  Lab Results   Component Value Date    HDL 60 08/17/2023    HDL 72 09/01/2020     Lab Results   Component Value Date    LDLCALC 92 08/17/2023    LDLCALC 107 (H) 09/01/2020     Lab Results   Component Value Date    TRIG 105 08/17/2023    TRIG 75 09/01/2020     No results found for: \"CHOLHDL\"    Review of Systems:  Review of Systems   Constitutional: Negative.  Negative for activity change, appetite change, fatigue and fever.   Respiratory:  Negative for cough, chest tightness and shortness of breath.    Cardiovascular:  Negative for chest pain, palpitations and leg swelling.   Gastrointestinal:  Negative for nausea and vomiting.   Musculoskeletal:  Negative for arthralgias and back pain.   Skin:  Negative for color change and pallor.   Neurological:  Negative for dizziness, syncope, weakness and light-headedness.   Hematological:  Negative for adenopathy.   Psychiatric/Behavioral:  Negative for agitation.    All other systems reviewed and are negative.      Physical Exam:  Physical Exam  Vitals and nursing note reviewed.   Constitutional:       General: She is not in acute distress.     Appearance: Normal appearance. She is not ill-appearing or diaphoretic.   HENT:      Head: Normocephalic and atraumatic.   Eyes:      Extraocular Movements: Extraocular movements intact.   Cardiovascular:      Rate and Rhythm: Normal rate and regular rhythm.      Heart sounds: No murmur heard.     No friction rub. No gallop.   Pulmonary:      Effort: Pulmonary effort is normal. No respiratory distress.      Breath sounds: Normal breath sounds.   Abdominal:      Palpations: Abdomen is soft.   Musculoskeletal:         General: No swelling. Normal range of motion.      Cervical back: Normal range of motion.   Skin:     General: Skin is warm and dry.      Capillary Refill: " Capillary refill takes less than 2 seconds.      Coloration: Skin is not pale.   Neurological:      General: No focal deficit present.      Mental Status: She is alert and oriented to person, place, and time.      Cranial Nerves: No cranial nerve deficit.   Psychiatric:         Mood and Affect: Mood normal.         Behavior: Behavior normal.

## 2024-04-18 ENCOUNTER — TELEPHONE (OUTPATIENT)
Dept: CARDIOLOGY CLINIC | Facility: CLINIC | Age: 44
End: 2024-04-18

## 2024-04-18 ENCOUNTER — CLINICAL SUPPORT (OUTPATIENT)
Dept: CARDIOLOGY CLINIC | Facility: CLINIC | Age: 44
End: 2024-04-18
Payer: COMMERCIAL

## 2024-04-18 DIAGNOSIS — I49.3 PVC (PREMATURE VENTRICULAR CONTRACTION): ICD-10-CM

## 2024-04-18 PROCEDURE — 93244 EXT ECG>48HR<7D REV&INTERPJ: CPT | Performed by: INTERNAL MEDICINE

## 2024-04-18 NOTE — TELEPHONE ENCOUNTER
----- Message from Enrrique Carpenter MD sent at 4/18/2024 12:37 PM EDT -----  Please let patient know that she had extra heart beats. About 5%    Please schedule her for follow up in 4-6 weeks to go over management and further steps  Thanks

## 2024-04-30 ENCOUNTER — OFFICE VISIT (OUTPATIENT)
Dept: CARDIOLOGY CLINIC | Facility: CLINIC | Age: 44
End: 2024-04-30
Payer: COMMERCIAL

## 2024-04-30 VITALS
RESPIRATION RATE: 16 BRPM | WEIGHT: 193 LBS | SYSTOLIC BLOOD PRESSURE: 140 MMHG | BODY MASS INDEX: 30.29 KG/M2 | HEIGHT: 67 IN | OXYGEN SATURATION: 98 % | HEART RATE: 80 BPM | DIASTOLIC BLOOD PRESSURE: 80 MMHG

## 2024-04-30 DIAGNOSIS — I10 HYPERTENSION, UNSPECIFIED TYPE: Primary | ICD-10-CM

## 2024-04-30 PROCEDURE — 3077F SYST BP >= 140 MM HG: CPT | Performed by: INTERNAL MEDICINE

## 2024-04-30 PROCEDURE — 3079F DIAST BP 80-89 MM HG: CPT | Performed by: INTERNAL MEDICINE

## 2024-04-30 PROCEDURE — 99214 OFFICE O/P EST MOD 30 MIN: CPT | Performed by: INTERNAL MEDICINE

## 2024-04-30 RX ORDER — AMLODIPINE BESYLATE 5 MG/1
5 TABLET ORAL DAILY
Qty: 30 TABLET | Refills: 3 | Status: SHIPPED | OUTPATIENT
Start: 2024-04-30

## 2024-04-30 NOTE — PROGRESS NOTES
Cardiology Follow Up    Marcelina Schwarz  1980  [id number]  [facility] [place]  [address] [id number] [phone]    Discussion/Summary:  PVC  Family history of CAD  Hypertension    Doing well. No symptoms  Cardiac event monitor reviewed and discussed with patient    BP elevated at todays visit. Will add amlodipine    Medications isde effects reviewed.     She willi nform us with onset of cardiac symptoms    Previous studies were reviewed.    Safety measures were reviewed.  Questions were entertained and answered.  Patient was advised to report any problems requiring medical attention.    Follow-up with PCP and appropriate specialist and lab work as discussed.    Return for follow up visit as scheduled or earlier, if needed.    Thank you for allowing me to participate in the care and evaluation of your patient.  Should you have any questions, please feel free to contact me.      History of Present Illness:     Marcelina Schwarz is a 44 y.o. female who presents for follow up for cardiovascular care.    Denies chest pain, chest pressure, shortness of breath, dizziness, lightheadedness, presyncope or syncope.  Denies orthopnea, PND or lower limb edema.      Echo:    Left Ventricle: Left ventricular cavity size is normal. Wall thickness is normal. The left ventricular ejection fraction is 50-55%. Systolic function is low normal. Wall motion is normal. Diastolic function is normal.    Left Atrium: The atrium is mildly dilated.    Occasional PVCs were noted. Consider rhythm monitoring if clinically indicated    Event monitor 24:  [facility]  Event Recorder Results  Indication: PVC   Duration of monitorin days  Interpretation:  Patient had a min HR of 40 bpm, max HR of 143 bpm, and avg HR of 67 bpm.  Predominant underlying rhythm was Sinus Rhythm.  2 Ventricular  Tachycardia runs occurred, the run with the fastest interval lasting 5 beats with a max rate of 143 bpm (avg 109 bpm); the run with the fastest interval was also the longest.  Isolated SVEs were rare (<1.0%), SVE Couplets were rare (<1.0%), and no SVE Triplets were present.  Isolated VEs were frequent (5.0%, 15089), VE Couplets were rare (<1.0%, 241), and no VE Triplets were present.  Ventricular Bigeminy and Trigeminy were present.  Afib: None  SVT: None  Pauses/heart blocks: none      Exercise stress test:  1.  Resting EKG showed frequent unifocal PVCs and monitor ST-T abnormalities  2.  With the stress of exercise mild J-point depression was noted in 2 3 aVF with marked upslope.  This is a negative stress test at greater than 85% of predicted maximum heart rate  3.  PVCs appear to go away with exercise  4.  Good exercise tolerance-13.4 METS  5.  No chest discomfort with exercise    Patient Active Problem List   Diagnosis    Triceps tendinitis    Carpal tunnel syndrome of right wrist    Breast nodule    Internal derangement of right knee    Preoperative examination    Elevated blood pressure reading    Family history of heart disease in male family member before age 55    PVC (premature ventricular contraction)     Past Medical History:   Diagnosis Date    Migraine     Varicella      Social History     Socioeconomic History    Marital status: /Civil Union     Spouse name: Not on file    Number of children: Not on file    Years of education: Not on file    Highest education level: Not on file   Occupational History    Not on file   Tobacco Use    Smoking status: Never    Smokeless tobacco: Never   Vaping Use    Vaping status: Never Used   Substance and Sexual Activity    Alcohol use: Not Currently    Drug use: Never    Sexual activity: Yes     Partners: Male     Birth control/protection: Male Sterilization   Other Topics Concern    Not on file   Social History Narrative    Always uses seat belt     Social  Determinants of Health     Financial Resource Strain: Not on file   Food Insecurity: Not on file   Transportation Needs: Not on file   Physical Activity: Not on file   Stress: Not on file   Social Connections: Not on file   Intimate Partner Violence: Not on file   Housing Stability: Not on file      Family History   Problem Relation Age of Onset    No Known Problems Mother     Cancer Father         urinary bladder - unspecified site    Hypertension Father     Heart block Father     No Known Problems Sister     No Known Problems Daughter     No Known Problems Son     No Known Problems Son     No Known Problems Son     Diabetes Maternal Grandmother         type 2 - diet controlled    Arthritis Maternal Grandmother     Asthma Maternal Grandmother     Heart disease Maternal Grandmother     Hypertension Maternal Grandmother     Heart disease Maternal Grandfather     Hypertension Maternal Grandfather     Depression Paternal Grandmother     Hypertension Paternal Grandmother     Hypertension Paternal Grandfather     Heart block Paternal Grandfather     Heart disease Paternal Grandfather         ill-defined    Early death Maternal Aunt     Heart disease Maternal Aunt     No Known Problems Maternal Aunt     No Known Problems Maternal Aunt     No Known Problems Paternal Aunt     Breast cancer Neg Hx      Past Surgical History:   Procedure Laterality Date    ANTERIOR CRUCIATE LIGAMENT REPAIR Right     CO ARTHRS KNE SURG W/MENISCECTOMY MED/LAT W/SHVG Right 7/1/2022    Procedure: ARTHROSCOPY KNEE- Right Knee Subtotal medial partial lateral meniscectomy, chondroplasty medial femoral condyle and patella, loose body medial.;  Surgeon: Herminio Garcia DO;  Location: [facility];  Service: Orthopedics    VAGINAL DELIVERY      x4       Current Outpatient Medications:     Multiple Vitamins-Minerals (multivitamin with minerals) tablet, Take 1 tablet by mouth daily, Disp: , Rfl:   Allergies   Allergen Reactions    Pollen Extract Itching  "      Labs:  Lab Results   Component Value Date    WBC 5.12 08/17/2023    HGB 13.8 08/17/2023    HCT 41.1 08/17/2023    MCV 87 08/17/2023     08/17/2023     Lab Results   Component Value Date    CALCIUM 9.1 08/17/2023    K 3.8 08/17/2023    CO2 28 08/17/2023     08/17/2023    BUN 15 08/17/2023    CREATININE 1.04 08/17/2023     No results found for: \"HGBA1C\"  No results found for: \"CHOL\"  Lab Results   Component Value Date    HDL 60 08/17/2023    HDL 72 09/01/2020     Lab Results   Component Value Date    LDLCALC 92 08/17/2023    LDLCALC 107 (H) 09/01/2020     Lab Results   Component Value Date    TRIG 105 08/17/2023    TRIG 75 09/01/2020     No results found for: \"CHOLHDL\"    Review of Systems:  Review of Systems   Constitutional: Negative.  Negative for activity change, appetite change, fatigue and fever.   Respiratory:  Negative for chest tightness, shortness of breath and wheezing.    Cardiovascular:  Negative for chest pain, palpitations and leg swelling.   Gastrointestinal:  Negative for diarrhea, nausea and vomiting.   Neurological:  Negative for dizziness, syncope, weakness and light-headedness.   Hematological:  Negative for adenopathy.   All other systems reviewed and are negative.      Physical Exam:  Physical Exam  Vitals and nursing note reviewed.   Constitutional:       General: She is not in acute distress.     Appearance: Normal appearance. She is not ill-appearing or diaphoretic.   HENT:      Head: Normocephalic and atraumatic.   Eyes:      Extraocular Movements: Extraocular movements intact.   Cardiovascular:      Rate and Rhythm: Normal rate and regular rhythm.      Heart sounds: No murmur heard.     No friction rub. No gallop.   Pulmonary:      Effort: Pulmonary effort is normal. No respiratory distress.      Breath sounds: Normal breath sounds.   Abdominal:      General: There is no distension.      Palpations: Abdomen is soft.   Musculoskeletal:         General: No swelling. Normal " range of motion.      Cervical back: Normal range of motion.   Skin:     General: Skin is warm and dry.      Capillary Refill: Capillary refill takes less than 2 seconds.      Coloration: Skin is not pale.   Neurological:      General: No focal deficit present.      Mental Status: She is alert and oriented to person, place, and time.      Cranial Nerves: No cranial nerve deficit.   Psychiatric:         Mood and Affect: Mood normal.         Behavior: Behavior normal.

## 2024-05-12 ENCOUNTER — AMB VIDEO VISIT (OUTPATIENT)
Dept: OTHER | Facility: HOSPITAL | Age: 44
End: 2024-05-12

## 2024-05-12 PROCEDURE — ECARE PR SL URGENT CARE VIRTUAL VISIT: Performed by: INTERNAL MEDICINE

## 2024-05-12 NOTE — CARE ANYWHERE EVISITS
Visit Summary for KEILA FERNÁNDEZ - Gender: Female - Date of Birth: 1980  Date: [id number] - Duration: 1 minutes  Patient: KEILA FERNÁNDEZ  Provider: Diego Larkin    Patient Contact Information  Address  [address] [place] [address] [address]  [place]; [place] [address]  [phone]    Visit Topics  Pink eye  [Added By: Self - 2024-05-12]    Triage Questions   What is your current physical address in the event of a medical emergency? Answer []  Are you allergic to any medications? Answer []  Are you now or could you be pregnant? Answer []  Do you have any immune system compromise or chronic lung   disease? Answer []  Do you have any vulnerable family members in the home (infant, pregnant, cancer, elderly)? Answer []     Conversation Transcripts  [0A][0A] [Notification] You are connected with Diego Larkin, Adult Medicine.[0A][Notification] KEILA FERNÁNDEZ is located in [place].[0A][Notification] KEILA FERNÁNDEZ has shared health history...[0A][Notification] Diego Larkin has added a   prescription.[0A]    Diagnosis  Acute atopic conjunctivitis, right eye    Procedures  Value: 02310 Code: CPT-4 UNLISTED E&M SERVICE    Medications Prescribed    ciprofloxacin HCl  Dose : 2 drops  Route : ophthalmic (eye)  Frequency : every 4 hours. Until directed to stop.   Patient Instructions : right eye  Refills : 0  Instructions to the Pharmacist : Substitutions allowed      Provider Notes  [0A][0A] Mode of Communication: video[0A]HPI: The patient has eye redness with the right side and does not wear contact lenses.[0A]PMH: nonePSH:  at homeMeds:noneAllergies: none[0A]Exam:Gen: Alert, normal mental status and interaction, no visible   distress, non- toxic appearance.HEENT: right eye rednessLungs clear[0A]Assessment: right conjunctivitis[0A]Plan:1. I am sending you a prescription as described below as Cipro eye drops for 7 days2. Discussed precautions.[0A]Follow up:1. If there are any   questions or problems with the prescription,  call [phone] anytime for assistance.2. Please re-connect for another online visit or see an in-person provider should your symptoms worsen or persist.3. Taking a probiotic (either in pill form or by   eating yogurt that contains probiotics) while using antibiotics can help prevent some of the troublesome side effects that antibiotics can sometimes cause.4. Please print a copy of this note and send it to your regular doctor, or take it to your next   visit so it may be included in your medical record.[0A]Patient voiced understanding and agrees to plan.[0A]Please see your PCP on an annual basis.[0A]    Electronically signed by: Diego Larkin(NPI [id number])

## 2024-08-07 ENCOUNTER — OFFICE VISIT (OUTPATIENT)
Age: 44
End: 2024-08-07
Payer: COMMERCIAL

## 2024-08-07 VITALS
BODY MASS INDEX: 31.23 KG/M2 | HEIGHT: 67 IN | SYSTOLIC BLOOD PRESSURE: 151 MMHG | DIASTOLIC BLOOD PRESSURE: 79 MMHG | WEIGHT: 199 LBS | HEART RATE: 59 BPM | TEMPERATURE: 97.9 F | RESPIRATION RATE: 18 BRPM | OXYGEN SATURATION: 99 %

## 2024-08-07 DIAGNOSIS — L23.7 POISON IVY DERMATITIS: Primary | ICD-10-CM

## 2024-08-07 PROCEDURE — 99213 OFFICE O/P EST LOW 20 MIN: CPT | Performed by: PHYSICIAN ASSISTANT

## 2024-08-07 RX ORDER — FEXOFENADINE HCL 180 MG/1
180 TABLET ORAL DAILY
Start: 2024-08-07 | End: 2024-08-14

## 2024-08-07 RX ORDER — PREDNISONE 10 MG/1
TABLET ORAL
Qty: 30 TABLET | Refills: 0 | Status: SHIPPED | OUTPATIENT
Start: 2024-08-07

## 2024-08-07 NOTE — PROGRESS NOTES
[facility]        NAME: Marcelina Schwarz is a 44 y.o. female  : 1980    MRN: [id number]  DATE: 2024  TIME: 12:10 PM    Assessment and Plan   Poison ivy dermatitis [L23.7]  1. Poison ivy dermatitis  predniSONE 10 mg tablet    fexofenadine (ALLEGRA) 180 MG tablet            Patient Instructions     I suspect poison sumac dermatitis    Cool compresses locally  Allegra 180 mg 1 tablet once daily  May use Benadryl at night if needed  Prednisone as directed    Follow up with PCP in 3-5 days.  Proceed to  ER if symptoms worsen.      Chief Complaint     Chief Complaint   Patient presents with    Rash     Started 4 days ago, patient complains of generalized rash.          History of Present Illness       44-year-old female is presenting today with complaint of a pruritic rash developing in certain parts of her body such as, right upper chest region, lower left abdomen and distal arms.  Patient has been using Benadryl to help alleviate the itch.        Review of Systems   Review of Systems   Constitutional:  Negative for fever.   HENT:  Negative for sore throat and trouble swallowing.    Respiratory:  Negative for cough, chest tightness and wheezing.    Gastrointestinal:  Negative for nausea.   Skin:  Positive for rash.   Neurological:  Negative for light-headedness.         Current Medications       Current Outpatient Medications:     fexofenadine (ALLEGRA) 180 MG tablet, Take 1 tablet (180 mg total) by mouth daily for 7 days, Disp: , Rfl:     predniSONE 10 mg tablet, Take 4 pills PO once in the morning for first 3 days.  3 Pills PO for next 3 days, 2 pills PO for next 3 days, 1 pill PO for next 3 days., Disp: 30 tablet, Rfl: 0    amLODIPine (NORVASC) 5 mg tablet, Take 1 tablet (5 mg total) by mouth daily (Patient not taking: Reported on 2024), Disp: 30 tablet, Rfl: 3    Multiple Vitamins-Minerals (multivitamin with minerals) tablet, Take 1 tablet by mouth daily (Patient not taking: Reported  on 8/7/2024), Disp: , Rfl:     Current Allergies     Allergies as of 08/07/2024 - Reviewed 08/07/2024   Allergen Reaction Noted    Pollen extract Itching 03/08/2016            The following portions of the patient's history were reviewed and updated as appropriate: allergies, current medications, past family history, past medical history, past social history, past surgical history and problem list.     Past Medical History:   Diagnosis Date    Hypertension 4/30/2024    Migraine     Varicella        Past Surgical History:   Procedure Laterality Date    ANTERIOR CRUCIATE LIGAMENT REPAIR Right     RI ARTHRS KNE SURG W/MENISCECTOMY MED/LAT W/SHVG Right 7/1/2022    Procedure: ARTHROSCOPY KNEE- Right Knee Subtotal medial partial lateral meniscectomy, chondroplasty medial femoral condyle and patella, loose body medial.;  Surgeon: Herminio Garcia DO;  Location: [facility] OR;  Service: Orthopedics    VAGINAL DELIVERY      x4       Family History   Problem Relation Age of Onset    No Known Problems Mother     Cancer Father         urinary bladder - unspecified site    Hypertension Father     Heart block Father     No Known Problems Sister     No Known Problems Daughter     No Known Problems Son     No Known Problems Son     No Known Problems Son     Diabetes Maternal Grandmother         type 2 - diet controlled    Arthritis Maternal Grandmother     Asthma Maternal Grandmother     Heart disease Maternal Grandmother     Hypertension Maternal Grandmother     Heart disease Maternal Grandfather     Hypertension Maternal Grandfather     Depression Paternal Grandmother     Hypertension Paternal Grandmother     Hypertension Paternal Grandfather     Heart block Paternal Grandfather     Heart disease Paternal Grandfather         ill-defined    Early death Maternal Aunt     Heart disease Maternal Aunt     No Known Problems Maternal Aunt     No Known Problems Maternal Aunt     No Known Problems Paternal Aunt     Breast cancer Neg Hx   "        Medications have been verified.        Objective   /79   Pulse 59   Temp 97.9 °F (36.6 °C)   Resp 18   Ht 5' 7\" (1.702 m)   Wt 90.3 kg (199 lb)   SpO2 99%   BMI 31.17 kg/m²        Physical Exam     Physical Exam  Vitals and nursing note reviewed.   Constitutional:       General: She is not in acute distress.     Appearance: Normal appearance. She is normal weight.   Eyes:      General: No scleral icterus.     Extraocular Movements: Extraocular movements intact.      Conjunctiva/sclera: Conjunctivae normal.      Pupils: Pupils are equal, round, and reactive to light.   Cardiovascular:      Rate and Rhythm: Normal rate and regular rhythm.      Pulses: Normal pulses.      Heart sounds: Normal heart sounds.   Pulmonary:      Effort: Pulmonary effort is normal. No respiratory distress.      Breath sounds: Normal breath sounds.   Musculoskeletal:         General: Normal range of motion.      Cervical back: Normal range of motion.   Skin:            Comments: Similar rashes found on the left side of the face upper distal extremities right upper chest wall and left lower quadrant -erythematous inflamed papules in groups and linear formation.  Nontender    Neurological:      Mental Status: She is alert and oriented to person, place, and time.      Coordination: Coordination normal.      Gait: Gait normal.   Psychiatric:         Mood and Affect: Mood normal.         Behavior: Behavior normal.                   "

## 2024-08-07 NOTE — PATIENT INSTRUCTIONS
Patient Education     Poison [place], Poison [place], Poison Sumac Discharge Instructions   About this topic   Poison ivy, oak, and sumac are plants that may cause rashes on the skin. The plants can be found anywhere, including the woods or your yard. The rash is caused by the oily sap of the plants. It does not smell and it is clear so you probably can't tell that it is there. Sometimes, you get a rash by touching the plants. Other times, it is from touching something else, like clothes, pets, another person, or gardening tools that have touched a plant. Smoke from burning these plants can also cause a rash.  Not everyone who comes in contact with the sap will get a reaction, but most people will. A reaction may happen a few hours after you touch the sap or it may happen up to 5 days later. If the sap gets on your skin, it may become red, swollen, and very itchy. Blisters may also form. You can't catch a rash from someone else, but you can get it from someone if they transfer the sap to you. The rash may last 1 to 3 weeks.  What care is needed at home?   Ask your doctor what you need to do when you go home. Make sure you ask questions if you do not understand what the doctor says. This way you will know what you need to do.  If you have a rash:  Do not scratch or rub your skin. This can lead to infection or spread the rash.  Try putting a cool, wet cloth on your rash.  Creams and lotions, like hydrocortisone cream and Calamine lotion, may help itching and blistering.  Take a cool shower to help ease the itching.  Take a bath using lukewarm water. Hot water can make itching worse. Adding oatmeal bath products or baking soda may also help.  After bathing or showering, blot your body dry rather than rubbing to avoid spread of the rash.  What follow-up care is needed?   Your doctor may ask you to make visits to the office to check on your progress. Be sure to keep these visits.  What drugs may be needed?   If you have a very  bad rash, your doctor may give you drugs to help with swelling and itching.  What can be done to prevent this health problem?   Learn what the plants look like and stay away from them. Poison ivy and poison oak have three leaves on one stem. Poison sumac has 7 to 13 leaves that grow in pairs.  Wear long pants, long sleeves, and gloves if you must be around these plants.  Wash your hands with soap and water within 15 minutes if you have contact with the plants.  Scrub your fingernails carefully to prevent spreading to other parts of your body.  Take a shower if the plants have touched your arms, legs, or other body parts. Wash well with soap and water.  Wash clothing and shoes with soap and hot water. Wash anything else, like gardening tools that may have touched a plant.  Wash your pets to remove oil from the fur. Pets do not get this rash but you may get it from touching oil on their fur.  Do not burn these plants. This will spread the oil into the air. It can cause very bad problems with other people if the wind is blowing.  If you are prone to getting a reaction from these plants, you may want to consider using over-the-counter products that can help block the oil from getting into the skin.  When do I need to call the doctor?   Signs of infection. These include a fever of 100.4°F (38°C) or higher, chills, wound that will not heal.  Trouble breathing or swallowing  Swelling of the face and lips or swelling that makes your eyes puffy or partially shut  A rash that covers a large part of your body, or that is spreading quickly  Pain, swelling, warmth, pus around the rash  A rash in your eyes, mouth, or genital area  Very bad itching that doesn't get better or keeps you awake at night  You are not feeling better in 2 to 3 days or you are feeling worse  Teach Back: Helping You Understand   The Teach Back Method helps you understand the information we are giving you. After you talk with the staff, tell them in your  own words what you learned. This helps to make sure the staff has described each thing clearly. It also helps to explain things that may have been confusing. Before going home, make sure you can do these:  I can tell you about my condition.  I can tell you how to care for my rash.  I can tell you how I will take extra care to prevent this from happening in the future.  I can tell you what I will do if I have trouble breathing or swallowing, or if the rash covers a large part of my face or my body.  Last Reviewed Date   2021-11-15  Consumer Information Use and Disclaimer   This generalized information is a limited summary of diagnosis, treatment, and/or medication information. It is not meant to be comprehensive and should be used as a tool to help the user understand and/or assess potential diagnostic and treatment options. It does NOT include all information about conditions, treatments, medications, side effects, or risks that may apply to a specific patient. It is not intended to be medical advice or a substitute for the medical advice, diagnosis, or treatment of a health care provider based on the health care provider's examination and assessment of a patient’s specific and unique circumstances. Patients must speak with a health care provider for complete information about their health, medical questions, and treatment options, including any risks or benefits regarding use of medications. This information does not endorse any treatments or medications as safe, effective, or approved for treating a specific patient. [employer] and its affiliates disclaim any warranty or liability relating to this information or the use thereof. The use of this information is governed by the Terms of Use, available at https://www.woltersPersonetauwer.com/en/know/clinical-effectiveness-terms   Copyright   Copyright © 2024 [employer] and its affiliates and/or licensors. All rights reserved.

## 2024-08-30 DIAGNOSIS — I10 HYPERTENSION, UNSPECIFIED TYPE: ICD-10-CM

## 2024-08-30 RX ORDER — AMLODIPINE BESYLATE 5 MG/1
5 TABLET ORAL DAILY
Qty: 90 TABLET | Refills: 3 | Status: SHIPPED | OUTPATIENT
Start: 2024-08-30

## 2024-08-30 NOTE — TELEPHONE ENCOUNTER
Refill received from [facility] for amlodipine through fax.    Previously, Dr. Carpenter's pt.    Please review and refill if appropriate.     Meds pending    Thanks!

## 2024-10-08 ENCOUNTER — OFFICE VISIT (OUTPATIENT)
Age: 44
End: 2024-10-08
Payer: COMMERCIAL

## 2024-10-08 VITALS
WEIGHT: 200.4 LBS | HEART RATE: 68 BPM | RESPIRATION RATE: 18 BRPM | SYSTOLIC BLOOD PRESSURE: 128 MMHG | OXYGEN SATURATION: 97 % | TEMPERATURE: 97.2 F | DIASTOLIC BLOOD PRESSURE: 82 MMHG | BODY MASS INDEX: 31.39 KG/M2

## 2024-10-08 DIAGNOSIS — L23.7 POISON IVY DERMATITIS: ICD-10-CM

## 2024-10-08 DIAGNOSIS — R21 RASH: Primary | ICD-10-CM

## 2024-10-08 PROCEDURE — 99213 OFFICE O/P EST LOW 20 MIN: CPT | Performed by: PHYSICIAN ASSISTANT

## 2024-10-08 RX ORDER — PREDNISONE 10 MG/1
TABLET ORAL
Qty: 30 TABLET | Refills: 0 | Status: SHIPPED | OUTPATIENT
Start: 2024-10-08 | End: 2024-10-08

## 2024-10-08 RX ORDER — PREDNISONE 10 MG/1
TABLET ORAL
Qty: 40 TABLET | Refills: 0 | Status: SHIPPED | OUTPATIENT
Start: 2024-10-08 | End: 2024-10-22

## 2024-10-08 NOTE — PROGRESS NOTES
[facility]        NAME: Marcelina Schwarz is a 44 y.o. female  : 1980    MRN: [id number]  DATE: 2024  TIME: 4:41 PM    Assessment and Plan   Rash [R21]  1. Rash  Ambulatory Referral to Dermatology    predniSONE 10 mg tablet      2. Poison ivy dermatitis  DISCONTINUED: predniSONE 10 mg tablet          Patient has recurrent rash at this time is unclear what the etiology is was thought to be from poison sumac previously she had taken over 3 weeks worth of prednisone in August.  This rash is similar and pruritic will prescribe prednisone again but she is also referred to a dermatologist given the recurrence of this rash.    The patient and/or parent/legal guardian verbalized understanding of exam findings and   Treatment plan. We engaged in the shared decision-making process and treatment options were   discussed at length with the patient.  All questions, concerns and complaints were answered and   addressed to the patient's' and/or parent/legal guardians's satisfaction.    Patient Instructions   There are no Patient Instructions on file for this visit.    Follow up with PCP in 3-5 days.  Proceed to  ER if symptoms worsen.    If tests are performed, our office will contact you with results only if   changes need to made to the care plan discussed with you at the visit.   You can review your full results on [facility].     Chief Complaint     Chief Complaint   Patient presents with    Rash     Sporadic rash on face, L ear, neck, fingers X 1day. + pruritis         History of Present Illness       HPI  Patient comes in complaining of a sporadic rash on her face left ear her neck or fingers for the past day as well as itching.  She had a similar rash in August was thought to have a poison sumac she had to have 2 rounds of prednisone ordered to get this to resolve this rash does seem to be the same as the rash had been summertime she denies exposure to poison ivy but was doing some yard  work few days ago.    Review of Systems   Review of Systems  All other related systems reviewed and are negative except as noted in HPI    Current Medications       Current Outpatient Medications:     amLODIPine (NORVASC) 5 mg tablet, Take 1 tablet (5 mg total) by mouth daily, Disp: 90 tablet, Rfl: 3    predniSONE 10 mg tablet, Take 5 tablets (50 mg total) by mouth daily for 2 days, THEN 4 tablets (40 mg total) daily for 3 days, THEN 3 tablets (30 mg total) daily for 3 days, THEN 2 tablets (20 mg total) daily for 3 days, THEN 1 tablet (10 mg total) daily for 3 days., Disp: 40 tablet, Rfl: 0    fexofenadine (ALLEGRA) 180 MG tablet, Take 1 tablet (180 mg total) by mouth daily for 7 days, Disp: , Rfl:     Multiple Vitamins-Minerals (multivitamin with minerals) tablet, Take 1 tablet by mouth daily (Patient not taking: Reported on 8/7/2024), Disp: , Rfl:     Current Allergies     Allergies as of 10/08/2024 - Reviewed 10/08/2024   Allergen Reaction Noted    Pollen extract Itching 03/08/2016            The following portions of the patient's history were reviewed and updated as appropriate: allergies, current medications, past family history, past medical history, past social history, past surgical history and problem list.     Past Medical History:   Diagnosis Date    Hypertension 4/30/2024    Migraine     Varicella        Past Surgical History:   Procedure Laterality Date    ANTERIOR CRUCIATE LIGAMENT REPAIR Right     DC ARTHRS KNE SURG W/MENISCECTOMY MED/LAT W/SHVG Right 7/1/2022    Procedure: ARTHROSCOPY KNEE- Right Knee Subtotal medial partial lateral meniscectomy, chondroplasty medial femoral condyle and patella, loose body medial.;  Surgeon: Herminio Garcia DO;  Location: [facility];  Service: Orthopedics    VAGINAL DELIVERY      x4       Family History   Problem Relation Age of Onset    No Known Problems Mother     Cancer Father         urinary bladder - unspecified site    Hypertension Father     Heart block  Father     No Known Problems Sister     No Known Problems Daughter     No Known Problems Son     No Known Problems Son     No Known Problems Son     Diabetes Maternal Grandmother         type 2 - diet controlled    Arthritis Maternal Grandmother     Asthma Maternal Grandmother     Heart disease Maternal Grandmother     Hypertension Maternal Grandmother     Heart disease Maternal Grandfather     Hypertension Maternal Grandfather     Depression Paternal Grandmother     Hypertension Paternal Grandmother     Hypertension Paternal Grandfather     Heart block Paternal Grandfather     Heart disease Paternal Grandfather         ill-defined    Early death Maternal Aunt     Heart disease Maternal Aunt     No Known Problems Maternal Aunt     No Known Problems Maternal Aunt     No Known Problems Paternal Aunt     Breast cancer Neg Hx          Medications have been verified.        Objective   /82 (BP Location: Left arm, Patient Position: Sitting, Cuff Size: Adult)   Pulse 68   Temp (!) 97.2 °F (36.2 °C) (Tympanic)   Resp 18   Wt 90.9 kg (200 lb 6.4 oz)   LMP 10/04/2024 (Exact Date)   SpO2 97%   BMI 31.39 kg/m²   Patient's last menstrual period was 10/04/2024 (exact date).       Physical Exam     Physical Exam  Constitutional:       General: She is not in acute distress.     Appearance: She is well-developed.   HENT:      Head: Normocephalic and atraumatic.   Eyes:      General: No scleral icterus.     Conjunctiva/sclera: Conjunctivae normal.   Neck:      Trachea: No tracheal deviation.   Pulmonary:      Effort: Pulmonary effort is normal. No respiratory distress.      Breath sounds: No stridor.   Musculoskeletal:      Cervical back: Normal range of motion.   Skin:     General: Skin is warm and dry.      Findings: Erythema and rash present.      Comments: Maculopapular erythematous rash present over the neck hands ear face   Neurological:      Mental Status: She is alert and oriented to person, place, and time.  "  Psychiatric:         Behavior: Behavior normal.         Ortho Exam        Procedures  No Procedures performed today        Note: Portions of this record may have been created with voice recognition software. Occasional wrong word or \"sound a like\" substitutions may have occurred due to the inherent limitations of voice recognition software. Please read the chart carefully and recognize, using context, where substitutions have occurred.*      "

## 2024-10-30 ENCOUNTER — OFFICE VISIT (OUTPATIENT)
Dept: FAMILY MEDICINE CLINIC | Facility: CLINIC | Age: 44
End: 2024-10-30
Payer: COMMERCIAL

## 2024-10-30 VITALS
OXYGEN SATURATION: 98 % | SYSTOLIC BLOOD PRESSURE: 128 MMHG | BODY MASS INDEX: 30.92 KG/M2 | HEART RATE: 66 BPM | DIASTOLIC BLOOD PRESSURE: 90 MMHG | WEIGHT: 197 LBS | RESPIRATION RATE: 18 BRPM | HEIGHT: 67 IN

## 2024-10-30 DIAGNOSIS — L30.9 DERMATITIS: ICD-10-CM

## 2024-10-30 DIAGNOSIS — Z00.00 HEALTH MAINTENANCE EXAMINATION: Primary | ICD-10-CM

## 2024-10-30 DIAGNOSIS — I10 PRIMARY HYPERTENSION: ICD-10-CM

## 2024-10-30 PROCEDURE — 99213 OFFICE O/P EST LOW 20 MIN: CPT | Performed by: FAMILY MEDICINE

## 2024-10-30 PROCEDURE — 99396 PREV VISIT EST AGE 40-64: CPT | Performed by: FAMILY MEDICINE

## 2024-10-30 RX ORDER — METHYLPREDNISOLONE 4 MG/1
TABLET ORAL
Qty: 21 EACH | Refills: 0 | Status: SHIPPED | OUTPATIENT
Start: 2024-10-30

## 2024-10-30 NOTE — PROGRESS NOTES
Assessment/Plan:         Problem List Items Addressed This Visit       Health maintenance examination - Primary    Hypertension    Relevant Orders    CBC and differential    Comprehensive metabolic panel    Lipid panel     Other Visit Diagnoses       Dermatitis        Relevant Medications    methylPREDNISolone 4 MG tablet therapy pack              Subjective:      Patient ID: Marcelina Schwarz is a 44 y.o. female.    Patient comes in because of recurrent rash on her hands, arms and legs.  She has been treated with prednisone which seems to help.  She is concerned regarding recurrence.        The following portions of the patient's history were reviewed and updated as appropriate:   Past Medical History:  She has a past medical history of Hypertension (4/30/2024), Migraine, and Varicella.,  _______________________________________________________________________  Medical Problems:  does not have any pertinent problems on file.,  _______________________________________________________________________  Past Surgical History:   has a past surgical history that includes Anterior cruciate ligament repair (Right); Vaginal delivery; and pr arthrs kne surg w/meniscectomy med/lat w/shvg (Right, 7/1/2022).,  _______________________________________________________________________  Family History:  family history includes Arthritis in her maternal grandmother; Asthma in her maternal grandmother; Cancer in her father; Depression in her paternal grandmother; Diabetes in her maternal grandmother; Early death in her maternal aunt; Heart block in her father and paternal grandfather; Heart disease in her maternal aunt, maternal grandfather, maternal grandmother, and paternal grandfather; Hypertension in her father, maternal grandfather, maternal grandmother, paternal grandfather, and paternal grandmother; No Known Problems in her daughter, maternal aunt, maternal aunt, mother, paternal aunt, sister, son, son, and  "serjio.,  _______________________________________________________________________  Social History:   reports that she has never smoked. She has never used smokeless tobacco. She reports that she does not currently use alcohol. She reports that she does not use drugs.,  _______________________________________________________________________  Allergies:  is allergic to pollen extract..  _______________________________________________________________________  Current Outpatient Medications   Medication Sig Dispense Refill    amLODIPine (NORVASC) 5 mg tablet Take 1 tablet (5 mg total) by mouth daily 90 tablet 3    methylPREDNISolone 4 MG tablet therapy pack Use as directed on package 21 each 0    fexofenadine (ALLEGRA) 180 MG tablet Take 1 tablet (180 mg total) by mouth daily for 7 days      Multiple Vitamins-Minerals (multivitamin with minerals) tablet Take 1 tablet by mouth daily (Patient not taking: Reported on 8/7/2024)       No current facility-administered medications for this visit.     _______________________________________________________________________  Review of Systems   Constitutional: Negative.    Respiratory: Negative.     Cardiovascular: Negative.    Gastrointestinal: Negative.          Objective:  Vitals:    10/30/24 1129   BP: 128/90   BP Location: Left arm   Patient Position: Sitting   Cuff Size: Standard   Pulse: 66   Resp: 18   SpO2: 98%   Weight: 89.4 kg (197 lb)   Height: 5' 7\" (1.702 m)     Body mass index is 30.85 kg/m².     Physical Exam  Constitutional:       Appearance: Normal appearance. She is well-developed.   HENT:      Head: Normocephalic and atraumatic.      Right Ear: External ear normal.      Left Ear: External ear normal.   Eyes:      Pupils: Pupils are equal, round, and reactive to light.   Neck:      Thyroid: No thyromegaly.   Cardiovascular:      Rate and Rhythm: Normal rate and regular rhythm.      Heart sounds: Normal heart sounds.   Pulmonary:      Effort: Pulmonary effort is " normal.      Breath sounds: Normal breath sounds.   Abdominal:      Palpations: Abdomen is soft.   Musculoskeletal:         General: Normal range of motion.      Cervical back: Neck supple.   Lymphadenopathy:      Cervical: No cervical adenopathy.   Skin:     Comments: 1 mobile papules on forearms palm of right hand and anterior thighs bilaterally   Neurological:      Mental Status: She is alert.   Psychiatric:         Mood and Affect: Mood normal.         Behavior: Behavior normal.

## 2024-11-29 PROBLEM — Z00.00 HEALTH MAINTENANCE EXAMINATION: Status: RESOLVED | Noted: 2018-06-04 | Resolved: 2024-11-29

## 2025-01-28 NOTE — PROGRESS NOTES
Diagnoses and all orders for this visit:    Encounter for annual routine gynecological examination  -     Liquid-based pap, screening    Encounter for screening mammogram for malignant neoplasm of breast  -     Mammo screening bilateral w 3d and cad; Future    Colon cancer screening  -     Ambulatory referral to Gastroenterology; Future        Perineal hygiene reviewed   Weight bearing exercises minium of 150 mins/weekly advised.   Kegel exercises recommended daily, see AVS for instructions and recommendations  SBE encouraged, ASCCP guidelines reviewed. Condoms encouraged with all sexual activity to prevent STI's.   Gardisil vaccines recommended up to age 45  Calcium/ Vit D dietary requirements discussed,   Advised to call with any issues,  all concerns & questions addressed.   See after visit summary for further information and recommendations to the above mentioned subjects which we may or may not have covered in detail during your visit   F/U Annually and PRN      Health Maintenance:    Last PAP: 2022 Neg HPV Neg   Next PAP Due:Collect today     Last Mammogram: 03/15/2024    Life time Re Olivera % 9.62, Density B scattered areas of fibroglandular density , Bi-Rads 2 Benign  Next Mammogram: order given    Last Colonoscopy:  Referral given to patient at today's visit .    Gardisal : Not completed       Subjective    CC: Yearly Exam      Marcelina Schwarz is a 45 y.o. female here for an annual exam.   GYN hx includes:   4 vaginal deliveries   Family hx of:  No GYN cancers  Medically stable, reports no changes in medical Hx, follows with PMD    Patient's last menstrual period was 2024 (exact date).  Her menstrual cycles are regular every 23 days. [place] in duration with 1 heavy day  She denies issues with bleeding during her menses.   Denies history of abnormal pap smear.  She denies breast concerns, abnormal vaginal discharge, vaginal itching, odor, irritation, bowel/bladder dysfunction,  urinary symptoms, pelvic pain today.   She is sexually active. Monogamous relationship.  Her current method of contraception includes  male vasectomy . Denies any issues with her BCM.  She does not want STD testing today.  Denies intimate partner violence    3 boys, 1 girl  8,10,11,13  Was a , stays at home now with kids home schooling  Going to [place] in February, her parents have a condo there        Past Medical History:   Diagnosis Date    Hypertension 4/30/2024    Migraine     Varicella      Past Surgical History:   Procedure Laterality Date    ANTERIOR CRUCIATE LIGAMENT REPAIR Right     ME ARTHRS KNE SURG W/MENISCECTOMY MED/LAT W/SHVG Right 7/1/2022    Procedure: ARTHROSCOPY KNEE- Right Knee Subtotal medial partial lateral meniscectomy, chondroplasty medial femoral condyle and patella, loose body medial.;  Surgeon: Herminio Garcia DO;  [facility];  Service: Orthopedics    VAGINAL DELIVERY      x4       Immunization History   Administered Date(s) Administered    COVID-19 PFIZER VACCINE 0.3 ML IM 08/13/2021, 09/03/2021    INFLUENZA 12/01/2015    Influenza Injectable, MDCK, Preservative Free, Quadrivalent, 0.5 mL 11/05/2019    Influenza, injectable, quadrivalent, preservative free 0.5 mL 10/26/2020    Influenza, seasonal, injectable, preservative free 12/01/2015    MMR 07/10/2016    Tdap 03/24/2016       Family History   Problem Relation Age of Onset    No Known Problems Mother     Cancer Father         urinary bladder - unspecified site    Hypertension Father     Heart block Father     No Known Problems Sister     No Known Problems Daughter     No Known Problems Son     No Known Problems Son     No Known Problems Son     Diabetes Maternal Grandmother         type 2 - diet controlled    Arthritis Maternal Grandmother     Asthma Maternal Grandmother     Heart disease Maternal Grandmother     Hypertension Maternal Grandmother     Heart disease Maternal Grandfather     Hypertension  Maternal Grandfather     Depression Paternal Grandmother     Hypertension Paternal Grandmother     Hypertension Paternal Grandfather     Heart block Paternal Grandfather     Heart disease Paternal Grandfather         ill-defined    Early death Maternal Aunt     Heart disease Maternal Aunt     No Known Problems Maternal Aunt     No Known Problems Maternal Aunt     No Known Problems Paternal Aunt     Breast cancer Neg Hx      Social History     Tobacco Use    Smoking status: Never    Smokeless tobacco: Never   Vaping Use    Vaping status: Never Used   Substance Use Topics    Alcohol use: Not Currently    Drug use: Never       Current Outpatient Medications:     amLODIPine (NORVASC) 5 mg tablet, Take 1 tablet (5 mg total) by mouth daily, Disp: 90 tablet, Rfl: 3    Multiple Vitamins-Minerals (multivitamin with minerals) tablet, Take 1 tablet by mouth daily, Disp: , Rfl:   Patient Active Problem List    Diagnosis Date Noted    Hypertension 2024    PVC (premature ventricular contraction) 2024    Family history of heart disease in male family member before age 55 2023    Preoperative examination 2022    Elevated blood pressure reading 2022    Internal derangement of right knee 2022    Breast nodule 2022    Carpal tunnel syndrome of right wrist 2020    Triceps tendinitis 2019       Allergies   Allergen Reactions    Pollen Extract Itching       OB History    Para Term  AB Living   4 4 4 0 0 4   SAB IAB Ectopic Multiple Live Births   0 0 0 0 4      # Outcome Date GA Lbr Christopher/2nd Weight Sex Type Anes PTL Lv   4 Term 16 41w1d / 00:02 3725 g (8 lb 3.4 oz) F Vag-Spont EPI N MADI      Birth Comments: head circumference per SBAR of charting on Baby Girl [place]   3 Term 10/10/14    M Vag-Spont   MADI   2 Term 07/15/13    M    MADI   1 Term 10/01/11    M    MADI      Obstetric Comments   Menarche: 14 years old.    4 vaginal deliveries        Vitals:    25  "[id number]   BP: 134/80   BP Location: Left arm   Patient Position: Sitting   Cuff Size: Large   Weight: 89.4 kg (197 lb)   Height: 5' 7\" (1.702 m)     Body mass index is 30.85 kg/m².    Review of Systems     Constitutional: Negative for chills, fatigue, fever, headaches, visual disturbances, and unexpected weight change.   Respiratory: Negative for cough, & shortness of breath.  Cardiovascular: Negative for chest pain. .    Gastrointestinal: Negative for Abd pain, nausea & vomiting, constipation and diarrhea.   Genitourinary: Negative for difficulty urinating, dysuria, hematuria,  unusual vaginal bleeding or discharge  Skin: Negative skin changes    Physical Exam     Constitutional: Alert & Oriented x3, well-developed and well-nourished. No distress.   HENT: Atraumatic, Normocephalic, Conjunctivae clear  Neck: Normal range of motion. Neck supple. No thyromegaly, mass, nodules or tenderness  Pulmonary: Effort normal.   Abdominal: Soft. No tenderness or masses  Musculoskeletal: Normal ROM  Skin: Warm & Dry  Psychological: Normal mood, thought content, behavior & judgement     Breasts:   Right: tissue soft without masses, tenderness, skin changes or nipple discharge. No areas of erythema or pain. No subclavicular, axillary, pectoral adenopathy  Left:  tissue soft without masses, tenderness, skin changes or nipple discharge. No areas of erythema or pain. No subclavicular, axillary, pectoral adenopathy    Pelvic exam was performed with patient supine, lithotomy position.      Labia: Negative rash, tenderness, lesion or injury on the right labia.              Negative rash, tenderness, lesion or injury on the left labia.   Urethral meatus:  Negative for  tenderness, inflammation or discharge.   Uterus: not deviated, enlarged, fixed or tender.   Cervix: No CMT, no discharge or friability.   Right adnexa: no mass, no tenderness and no fullness.  Left adnexa: no mass, no tenderness and no fullness.   Vagina: No erythema, " tenderness, masses, or foreign body in the vagina. No signs of injury around the vagina. No unusual vaginal discharge   Perineum without lesions, signs of injury, erythema or swelling.  Inguinal Canal:        Right: No inguinal adenopathy or hernia present.        Left: No inguinal adenopathy or hernia present.

## 2025-01-28 NOTE — PATIENT INSTRUCTIONS
Patient Education     Lowering Your Risk of Breast Cancer   About this topic   Breast cancer is a serious illness. Breast cancer is when abnormal cells grow and divide more quickly in your breast. These cells form a growth or tumor. The abnormal cells may enter nearby tissue and spread to other parts of the body. It is the type of cancer most often seen in women. Men can have breast cancer, but it is a rare condition.  General   Some things in your life may increase your risk of breast cancer. You may not be able to change some of these. Others you can control.  You are more likely to get breast cancer if you:  Have a mother, sister, or daughter who has had breast cancer  Have used hormones for menopause for more than 5 years  Have had radiation therapy to the breast or chest in the past  Are overweight or do not exercise  Had your first menstrual period before you were 11 years old  Went through menopause after age 55  Have never been pregnant or had your first child after age 35  Have had breast cancer before  Drink alcohol in any form  Have dense breasts  Are older in age  There is no certain way to prevent breast cancer. There are things you can do to lower your chances of having breast cancer.  Keep a healthy weight. Lose weight if you are overweight. Being overweight raises your chances of having breast cancer.  Eat a healthy diet to maintain a healthy weight, such as more fruits, vegetables, and lean cuts of meat. Decrease the amount of saturated fat in your diet.  Exercise. Being active helps you keep a healthy weight.  Limit your alcohol intake or do not drink alcohol. The more alcohol you drink, the higher your risk.  Do not smoke cigarettes. Smoking can increase your risk of many types of cancer.  Breastfeed your baby. This may help protect you. The longer you breastfeed, the more protection you have.  Talk with your doctor about:  Limiting or stopping hormone therapy.  Taking certain drugs to prevent  breast cancer. For women at high risk of having breast cancer, there are a few drugs that may lower your risk.  Surgery to prevent you from having breast cancer if you are very high risk.  When do I need to call the doctor?   Changes in your breasts  A lump or area in your breast that feels different  Discharge from your nipple  Skin on your breast is dimpled or indented  You have questions or concerns about your breasts  Helpful tips   Talk to your doctor about the best kind of breast cancer screening for you.  If you want to do self breast exams, have your doctor show you the right way to do them.  Tell your doctor of any abnormal finding.  Last Reviewed Date   2021-10-04  Consumer Information Use and Disclaimer   This generalized information is a limited summary of diagnosis, treatment, and/or medication information. It is not meant to be comprehensive and should be used as a tool to help the user understand and/or assess potential diagnostic and treatment options. It does NOT include all information about conditions, treatments, medications, side effects, or risks that may apply to a specific patient. It is not intended to be medical advice or a substitute for the medical advice, diagnosis, or treatment of a health care provider based on the health care provider's examination and assessment of a patient’s specific and unique circumstances. Patients must speak with a health care provider for complete information about their health, medical questions, and treatment options, including any risks or benefits regarding use of medications. This information does not endorse any treatments or medications as safe, effective, or approved for treating a specific patient. [employer] and its affiliates disclaim any warranty or liability relating to this information or the use thereof. The use of this information is governed by the Terms of Use, available at  https://www.woltersSeeqpoduwer.com/en/know/clinical-effectiveness-terms   Copyright   Copyright © 2024 [employer] and its affiliates and/or licensors. All rights reserved.       Perineal Hygiene      Your vaginal naturally takes care of its self, it is a self washing system, the less you mess the healthier it will be     No soaps or feminine wash to the vulva, these products can cause dermitis, bacterial infections and other vulvar problems.   Use only water to cleanse, or water with Dove or Dove Sensitive Skin Bar soap if necessary.    Avoid the use of washcloths, exfoliating danna's, netted scrubbers, loofa's, use your hands only to cleanse the vulvar tissues    No scented lotions or products are advised in or near your vulva.    Use coconut oil in its solid form for moisture if needed.  No douching this may cause imbalance in your vaginal PH and further issues.    If you wear panty liners, you may apply a thin coating of Vaseline, A&D ointment or coconut oil to the vulvar tissues as a skin barrier     Cotton underware, loose fitting clothing  Only perfume-free, dye-free laundry detergent, use a second rinse cycle   Avoid fabric softeners/dryer sheets.       Your partner should avoid the same products as well.       Over the counter probiotic to restore vaginal cindy may be helpful as well, take daily.       You may also look into Boric Acid vaginal suppositories to restore vaginal PH balance for up to 2 weeks as directed on the box. You may not use these if you are pregnant      For vaginal dryness:      You may use:     Coconut oil in solid form, not liquid (organic, pure, unscented) as needed for moisture or lubrication. ( Do not use if allergic)       Replens moisture restore external comfort gel daily ( use as directed on the box)        Replens long lasting vaginal moisturizer  ( use as directed on the box)     May try Nextdoor vulvar moisturizer ( found on [employer] )    May try Revaree vaginal inserts        For  Vaginal Lubrication:          You may use:     Coconut oil in solid form, not liquid (organic, pure, unscented) as a lubricant or another scent-free lubricant (Astroglide, Uberlube) if needed.  Do not use coconut oil or silicone if using a condom as this may break down the integrity of the condom and cause an unplanned pregnancy              Do not use coconut oil if allergic               Replens silky smooth lubricant, premium silicone based lubricant for intercourse. ( use as directed, a small amount will provide an enhanced natural feeling)     Any premium over the counter vaginal lubricant water or silicone based. Silicone based will have more staying power.        For Vulvar irritation/itching:        You may use Hydrocortisone 1 % over the counter to external vulvar tissues 2 x daily for 5-7 days to help with irriation and itch relief.  Patient Education     Pelvic Floor Exercises   About this topic   The pelvic floor consists of muscles and strong bands of tissues which support all of the organs in your pelvis. Some of these organs are the bladder and the small and large bowel as well as the womb and the prostate. If the muscles and tissues get weak, your organs may drop. This can lead to other problems. Your urine may leak when you laugh, sneeze, or cough. You may not be able to drain the bladder fully. You may have less problems if you do exercises to strengthen your pelvic floor and abdominal muscles.  Kegel exercises help make the muscles in the pelvic floor stronger. Anyone can do them. It is also important to make your abdominal muscles stronger. In order for these exercises to work, you must be consistent when doing them.  General   Before starting with a program, ask your doctor if you are healthy enough to do these exercises. Your doctor may have you work with a  or physical therapist to make a safe exercise program to meet your needs.  Strengthening Exercises   Kegel exercises keep your  pelvic muscles firm and strong. You can do these in many different positions. Start by lying down with your knees bent and feet on the bed. Squeeze the pelvic muscles as if you are trying to stop the flow of urine. Hold these muscles for a count of 3, and then slowly relax them for a count of 3. Try to work up to squeezing for a count of 10 and slowly relaxing for a count of 10. Increase the muscle squeeze until you get to 10. When relaxing, slowly relax to a count of 10.  Breathe out when you are squeezing and breathe in when you are relaxing. Your goal is to try to do 10 Kegels 3 or more times each day. Take time to rest between sets. Be sure to only contract your pelvic floor muscles, not your buttocks, thighs, or abdominal muscles.  Pelvic floor contractions ? There are a few ways to feel the pelvic muscles contract.  When you are passing urine, try suddenly stopping your flow of urine. Do not do this on a regular basis, but only to feel what the contraction feels like. Doing this while passing urine can lead to other problems.  Put a finger into the vagina or rectum. Contract the muscles around your finger as if you were trying to stop the flow of urine or stop the passing of gas.  Place two chairs without arms about 2 inches (5 cm) apart. Sit so you have one butt cheek on each chair. Now, try michael your pelvic floor muscles. This will help you keep from using other muscles.  Pelvic tilts ? Lie on your back with your knees bent and feet flat on the floor. Tighten your stomach muscles and press your lower back down to the floor. Try doing Kegels with this exercise when your back is flattened. Hold 3 to 5 seconds. Relax.  Straight leg raises lying down ? Lie on your back with one leg straight. Bend your other knee so the foot is flat on the bed. Keeping your leg straight, lift the leg up to the level of your other knee. Lower it back down. Repeat with the other leg.  Hip lifts ? Lie on your back with your  knees bent and feet flat on the floor. Tighten your stomach muscles and lift your buttocks off the floor. Try doing Kegels when up in this position. Hold 3 to 5 seconds. Relax.  Abdominal bracing ? Do this exercise in different positions: Lying down, sitting, and standing. Tighten your stomach muscles. While keeping the stomach muscles tight, tighten your pelvic floor muscles. Now, forcefully laugh or cough to see if you were able to prevent urine from leaking.  Abdominal crunches ? Lie on your back with both knees bent. Keep your feet flat on the floor. Place your hands in one of these positions. Try starting with the first position since it is the easiest. As you get better, use the other positions to make it harder.  Crunches with arms at sides.  Crunches with arms across chest.  Crunches with arms behind head. Be careful not to interlock your fingers behind your neck or head while doing crunches. This may add tension to your neck and cause strain.  Look at the ceiling. Tighten your belly muscles and lift your shoulders and upper back off the floor. Breathe out while you are doing this. Lower your shoulders to the floor. Breathe in while you are doing this. Relax your belly muscles all the way before starting another crunch.             What will the results be?   Less leakage of urine when you cough, sneeze, laugh, or run  Fewer strong urges to pass urine  Fewer trips to the bathroom each day  Less risk of organs, such as the uterus or bladder, dropping into the vagina  Faster recovery after childbirth or prostate surgery  Stronger core muscles  Increased sensitivity during sex  Helpful tips   You can also try doing a different kind of Kegels. Do 5 quick, strong pelvic floor contractions. Sometimes, if you have an urge to pass urine but are not near a bathroom, you can do this kind of Kegel to calm the urge.  Stay active and work out to keep your muscles strong and flexible.  Keep a healthy weight to avoid  putting too much stress on your spine. Eat a healthy diet to keep your muscles healthy.  Be sure you do not hold your breath when exercising. This can raise your blood pressure. If you tend to hold your breath, try counting out loud when exercising. If any exercise bothers you, stop right away.  Try walking or cycling at an easy pace for a few minutes to warm up your muscles. Do this again after exercising.  Doing exercises before a meal may be a good way to get into a routine. A good time to do these exercises is each time you are stopped at a stop light while driving.  Exercise may be slightly uncomfortable, but you should not have sharp pains. If you do get sharp pains, stop what you are doing. If the sharp pains continue, call your doctor.  Last Reviewed Date   2021-03-31  Consumer Information Use and Disclaimer   This generalized information is a limited summary of diagnosis, treatment, and/or medication information. It is not meant to be comprehensive and should be used as a tool to help the user understand and/or assess potential diagnostic and treatment options. It does NOT include all information about conditions, treatments, medications, side effects, or risks that may apply to a specific patient. It is not intended to be medical advice or a substitute for the medical advice, diagnosis, or treatment of a health care provider based on the health care provider's examination and assessment of a patient’s specific and unique circumstances. Patients must speak with a health care provider for complete information about their health, medical questions, and treatment options, including any risks or benefits regarding use of medications. This information does not endorse any treatments or medications as safe, effective, or approved for treating a specific patient. [employer], [employer] and its affiliates disclaim any warranty or liability relating to this information or the use thereof. The use of this information is  governed by the Terms of Use, available at https://www.Catchoomer.com/en/know/clinical-effectiveness-terms   Copyright   Copyright © 2024 [employer] and its affiliates and/or licensors. All rights reserved.  Patient Education     Perimenopause   About this topic   Perimenopause is the time which leads up to your last period. Perimenopause can take from 2 to 10 years before your periods fully stop. Some people start this stage around age 40. Others start this stage earlier or later.  What are the causes?   Changes in hormone levels cause perimenopause. Before this time, your hormone levels go up and down in an even pattern. As you get older, your hormone levels change. Your hormones do not follow an even pattern.  What can make this more likely to happen?   Perimenopause is a normal time in your life. You are more likely to have early perimenopause if you have been treated for cancer. If you have had a hysterectomy, you may be more at risk. If you are a smoker you may be at a higher risk. Some health treatments may also make early perimenopause more likely.  What are the main signs?   Periods that are not on a normal cycle  Hot flashes and night sweats  Mood changes  Vaginal dryness  Problems with sleep  Bladder problems  Changes in feelings about sex  Weaker bones  How does the doctor diagnose this health problem?   Your doctor will do an exam and take your history. The doctor will ask you about your periods. Your doctor may do a pelvic exam. The doctor may order lab tests.  How does the doctor treat this health problem?   The goal of care in perimenopause is to control the signs.  For very heavy bleeding during periods, the doctor may suggest endometrial ablation. The doctor uses laser, heat, or electrical energy to remove the lining of the womb.  What lifestyle changes are needed?   Stay active and work out to keep your muscles strong and flexible, and to strengthen your bones.  Keep a healthy  weight.  Avoid smoking.  Avoid beer, wine, and mixed drinks (alcohol). Avoid drinks with caffeine.  Learn how to manage stress. Learn ways to relax such as deep breathing and yoga.  What drugs may be needed?   The doctor may order drugs to:  Balance or replace hormones  Reduce hot flashes  Help with vaginal dryness  Ease heavy bleeding during periods  Strengthen the bones  Help with mood changes  Last Reviewed Date   2021-03-31  Consumer Information Use and Disclaimer   This generalized information is a limited summary of diagnosis, treatment, and/or medication information. It is not meant to be comprehensive and should be used as a tool to help the user understand and/or assess potential diagnostic and treatment options. It does NOT include all information about conditions, treatments, medications, side effects, or risks that may apply to a specific patient. It is not intended to be medical advice or a substitute for the medical advice, diagnosis, or treatment of a health care provider based on the health care provider's examination and assessment of a patient’s specific and unique circumstances. Patients must speak with a health care provider for complete information about their health, medical questions, and treatment options, including any risks or benefits regarding use of medications. This information does not endorse any treatments or medications as safe, effective, or approved for treating a specific patient. [employer], [employer] and its affiliates disclaim any warranty or liability relating to this information or the use thereof. The use of this information is governed by the Terms of Use, available at https://www.Hintsoft.com/en/know/clinical-effectiveness-terms   Copyright   Copyright © 2024 [employer], [employer] and its affiliates and/or licensors. All rights reserved.

## 2025-01-29 ENCOUNTER — ANNUAL EXAM (OUTPATIENT)
Dept: OBGYN CLINIC | Facility: CLINIC | Age: 45
End: 2025-01-29
Payer: COMMERCIAL

## 2025-01-29 VITALS
BODY MASS INDEX: 30.92 KG/M2 | DIASTOLIC BLOOD PRESSURE: 80 MMHG | HEIGHT: 67 IN | WEIGHT: 197 LBS | SYSTOLIC BLOOD PRESSURE: 134 MMHG

## 2025-01-29 DIAGNOSIS — Z01.419 ENCOUNTER FOR ANNUAL ROUTINE GYNECOLOGICAL EXAMINATION: Primary | ICD-10-CM

## 2025-01-29 DIAGNOSIS — Z12.31 ENCOUNTER FOR SCREENING MAMMOGRAM FOR MALIGNANT NEOPLASM OF BREAST: ICD-10-CM

## 2025-01-29 DIAGNOSIS — Z12.11 COLON CANCER SCREENING: ICD-10-CM

## 2025-01-29 PROCEDURE — G0145 SCR C/V CYTO,THINLAYER,RESCR: HCPCS | Performed by: OBSTETRICS & GYNECOLOGY

## 2025-01-29 PROCEDURE — S0612 ANNUAL GYNECOLOGICAL EXAMINA: HCPCS | Performed by: OBSTETRICS & GYNECOLOGY

## 2025-01-29 PROCEDURE — G0476 HPV COMBO ASSAY CA SCREEN: HCPCS | Performed by: OBSTETRICS & GYNECOLOGY

## 2025-02-03 LAB
LAB AP GYN PRIMARY INTERPRETATION: NORMAL
Lab: NORMAL

## 2025-02-04 ENCOUNTER — RESULTS FOLLOW-UP (OUTPATIENT)
Dept: OBGYN CLINIC | Facility: CLINIC | Age: 45
End: 2025-02-04

## 2025-03-18 ENCOUNTER — HOSPITAL ENCOUNTER (OUTPATIENT)
Age: 45
Discharge: HOME | End: 2025-03-18
Payer: COMMERCIAL

## 2025-03-18 VITALS — HEIGHT: 67 IN | WEIGHT: 197 LBS | BODY MASS INDEX: 30.92 KG/M2

## 2025-03-18 DIAGNOSIS — Z12.31 ENCOUNTER FOR SCREENING MAMMOGRAM FOR MALIGNANT NEOPLASM OF BREAST: ICD-10-CM

## 2025-03-18 PROCEDURE — 77063 BREAST TOMOSYNTHESIS BI: CPT

## 2025-03-18 PROCEDURE — 77067 SCR MAMMO BI INCL CAD: CPT

## 2025-04-18 ENCOUNTER — OFFICE VISIT (OUTPATIENT)
Dept: CARDIOLOGY CLINIC | Facility: CLINIC | Age: 45
End: 2025-04-18
Payer: COMMERCIAL

## 2025-04-18 VITALS
HEIGHT: 67 IN | RESPIRATION RATE: 16 BRPM | BODY MASS INDEX: 29.98 KG/M2 | SYSTOLIC BLOOD PRESSURE: 122 MMHG | OXYGEN SATURATION: 98 % | DIASTOLIC BLOOD PRESSURE: 74 MMHG | HEART RATE: 73 BPM | WEIGHT: 191 LBS

## 2025-04-18 DIAGNOSIS — I10 PRIMARY HYPERTENSION: Primary | ICD-10-CM

## 2025-04-18 DIAGNOSIS — I49.3 PVC (PREMATURE VENTRICULAR CONTRACTION): ICD-10-CM

## 2025-04-18 PROCEDURE — 99214 OFFICE O/P EST MOD 30 MIN: CPT | Performed by: INTERNAL MEDICINE

## 2025-04-18 NOTE — ASSESSMENT & PLAN NOTE
- I have reviewed all the previous records in great details.  My opinion she had elevated blood pressure recordings with a component of whitecoat hypertension and elevated blood pressure due to the pain issues.   - She has been taking amlodipine 5 mg.  I have asked her to maintain the blood pressure records and continue with a low-salt diet.  If the blood pressure is fairly adequate then she will reduce the dose of amlodipine to 2.5 mg.  In my opinion the antihypertensive medications can be gradually tapered off.

## 2025-04-18 NOTE — ASSESSMENT & PLAN NOTE
- Previous Holter monitoring has been reviewed.  It was benign in nature.  She also is beginning to have the symptoms of menopause.  I have explained to her that the palpitations and flushing will be the 2 most common symptom during this days.

## 2025-04-18 NOTE — PROGRESS NOTES
[facility] [place] [address] [address] [place] [address]  Cardiology Follow Up    Marcelina Schwarz  1980  [id number]    Assessment & Plan  Primary hypertension  - I have reviewed all the previous records in great details.  My opinion she had elevated blood pressure recordings with a component of whitecoat hypertension and elevated blood pressure due to the pain issues.   - She has been taking amlodipine 5 mg.  I have asked her to maintain the blood pressure records and continue with a low-salt diet.  If the blood pressure is fairly adequate then she will reduce the dose of amlodipine to 2.5 mg.  In my opinion the antihypertensive medications can be gradually tapered off.    PVC (premature ventricular contraction)  - Previous Holter monitoring has been reviewed.  It was benign in nature.  She also is beginning to have the symptoms of menopause.  I have explained to her that the palpitations and flushing will be the 2 most common symptom during this days.    Continue all medications. Previous studies reviewed with patient, medications reviewed and possible side effects discussed. Continue risk factor modification. Optimize weight, regular exercise and follow up with appropriate specialists and primary care physician as discussed.  All questions answered. Patient advised to report any problems prompting to medical attention. Return for follow up visit in 9 months or earlier if needed    Chief Complaint   Patient presents with   • Follow-up       Interval History: Marcelina Schwarz is a 45 y.o. female  with a history of elevated blood pressure who presents today for follow-up.    From the cardiac standpoint of view, Marcelina Schwarz denies any symptoms of having any chest pain, shortness of breath, palpitations, dizziness, or syncopal episodes.  There are no hospital admissions with his CAD, congestive heart failure or hypertensive urgency/emergency. She has been compliant with her medications  with no side effect profile noted.    The exercise capacity is above average              PMH:     Patient Active Problem List   Diagnosis   • Triceps tendinitis   • Carpal tunnel syndrome of right wrist   • Breast nodule   • Internal derangement of right knee   • Preoperative examination   • Elevated blood pressure reading   • Family history of heart disease in male family member before age 55   • PVC (premature ventricular contraction)   • Hypertension     Past Medical History:   Diagnosis Date   • Hypertension 4/30/2024   • Migraine    • Varicella      Social History     Socioeconomic History   • Marital status: /Civil Union     Spouse name: Not on file   • Number of children: Not on file   • Years of education: Not on file   • Highest education level: Not on file   Occupational History   • Not on file   Tobacco Use   • Smoking status: Never   • Smokeless tobacco: Never   Vaping Use   • Vaping status: Never Used   Substance and Sexual Activity   • Alcohol use: Not Currently   • Drug use: Never   • Sexual activity: Yes     Partners: Male     Birth control/protection: Male Sterilization   Other Topics Concern   • Not on file   Social History Narrative    Always uses seat belt     Social Drivers of Health     Financial Resource Strain: Not on file   Food Insecurity: Not on file   Transportation Needs: Not on file   Physical Activity: Not on file   Stress: Not on file   Social Connections: Not on file   Intimate Partner Violence: Not on file   Housing Stability: Not on file      Family History   Problem Relation Age of Onset   • No Known Problems Mother    • Cancer Father         urinary bladder - unspecified site   • Hypertension Father    • Heart block Father    • No Known Problems Sister    • No Known Problems Daughter    • No Known Problems Son    • No Known Problems Son    • No Known Problems Son    • Diabetes Maternal Grandmother         type 2 - diet controlled   • Arthritis Maternal Grandmother    •  "Asthma Maternal Grandmother    • Heart disease Maternal Grandmother    • Hypertension Maternal Grandmother    • Heart disease Maternal Grandfather    • Hypertension Maternal Grandfather    • Depression Paternal Grandmother    • Hypertension Paternal Grandmother    • Hypertension Paternal Grandfather    • Heart block Paternal Grandfather    • Heart disease Paternal Grandfather         ill-defined   • Early death Maternal Aunt    • Heart disease Maternal Aunt    • No Known Problems Maternal Aunt    • No Known Problems Maternal Aunt    • No Known Problems Paternal Aunt    • Breast cancer Neg Hx      Past Surgical History:   Procedure Laterality Date   • ANTERIOR CRUCIATE LIGAMENT REPAIR Right    • IA ARTHRS KNE SURG W/MENISCECTOMY MED/LAT W/SHVG Right 7/1/2022    Procedure: ARTHROSCOPY KNEE- Right Knee Subtotal medial partial lateral meniscectomy, chondroplasty medial femoral condyle and patella, loose body medial.;  Surgeon: Herminio Garcia DO;  Location: [facility] OR;  Service: Orthopedics   • VAGINAL DELIVERY      x4       Current Outpatient Medications:   •  amLODIPine (NORVASC) 5 mg tablet, Take 1 tablet (5 mg total) by mouth daily, Disp: 90 tablet, Rfl: 3  •  Multiple Vitamins-Minerals (multivitamin with minerals) tablet, Take 1 tablet by mouth daily, Disp: , Rfl:   Allergies   Allergen Reactions   • Pollen Extract Itching           Review of Systems:  Review of Systems   Constitutional: Negative.    HENT: Negative.     Eyes: Negative.    Respiratory: Negative.     Cardiovascular: Negative.    Musculoskeletal: Negative.    Neurological: Negative.    Psychiatric/Behavioral: Negative.           /74 (BP Location: Left arm, Patient Position: Sitting, Cuff Size: Standard)   Pulse 73   Resp 16   Ht 5' 7\" (1.702 m)   Wt 86.6 kg (191 lb)   SpO2 98%   BMI 29.91 kg/m²     Physical Exam:  Physical Exam  Vitals reviewed.   Constitutional:       Appearance: Normal appearance.   HENT:      Head: Normocephalic. "   Eyes:      Pupils: Pupils are equal, round, and reactive to light.   Cardiovascular:      Heart sounds: Normal heart sounds.   Pulmonary:      Effort: Pulmonary effort is normal.      Breath sounds: Normal breath sounds. No wheezing.   Abdominal:      General: Abdomen is flat.      Palpations: Abdomen is soft.   Skin:     General: Skin is warm.   Neurological:      Mental Status: She is alert.       Time spent 35 minutes in reviewing outpatient notes, reviewing and interpreting labs,  reviewing interpreting EKG and other cardiac studies, discussion and educating patient, documentation.      no

## (undated) DEVICE — BETHLEHEM UNIVERSAL  ARTHRO PK: Brand: CARDINAL HEALTH

## (undated) DEVICE — ACE WRAP 6 IN UNSTERILE

## (undated) DEVICE — PADDING CAST 4 IN  COTTON STRL

## (undated) DEVICE — SUT VICRYL 0 CT-2 18 IN J727D

## (undated) DEVICE — SCD SEQUENTIAL COMPRESSION COMFORT SLEEVE MEDIUM KNEE LENGTH: Brand: KENDALL SCD

## (undated) DEVICE — NEEDLE 25GA X 1 IN SAFETY GLIDE

## (undated) DEVICE — BLADE SHAVER DISSECTOR 4MM 13CM COOLCUT

## (undated) DEVICE — INTENDED FOR TISSUE SEPARATION, AND OTHER PROCEDURES THAT REQUIRE A SHARP SURGICAL BLADE TO PUNCTURE OR CUT.: Brand: BARD-PARKER ® CARBON RIB-BACK BLADES

## (undated) DEVICE — SUT ETHILON 3-0 PS-1 18 IN 1663G

## (undated) DEVICE — GAUZE SPONGES,16 PLY: Brand: CURITY

## (undated) DEVICE — LIGHT HANDLE COVER SLEEVE DISP BLUE STELLAR

## (undated) DEVICE — TUBING SUCTION 5MM X 12 FT

## (undated) DEVICE — OCCLUSIVE GAUZE STRIP,3% BISMUTH TRIBROMOPHENATE IN PETROLATUM BLEND: Brand: XEROFORM

## (undated) DEVICE — SUT VICRYL 2-0 CT-2 18 IN J726D

## (undated) DEVICE — ABDOMINAL PAD: Brand: DERMACEA

## (undated) DEVICE — DRAPE SHEET THREE QUARTER

## (undated) DEVICE — IMPERVIOUS STOCKINETTE: Brand: DEROYAL

## (undated) DEVICE — LIGHT GLOVE GREEN

## (undated) DEVICE — DRAPE EQUIPMENT RF WAND

## (undated) DEVICE — 4-PORT MANIFOLD: Brand: NEPTUNE 2

## (undated) DEVICE — CHLORAPREP HI-LITE 26ML ORANGE

## (undated) DEVICE — 3M™ STERI-STRIP™ REINFORCED ADHESIVE SKIN CLOSURES, R1547, 1/2 IN X 4 IN (12 MM X 100 MM), 6 STRIPS/ENVELOPE: Brand: 3M™ STERI-STRIP™

## (undated) DEVICE — TUBING ARTHROSCOPIC WAVE  MAIN PUMP

## (undated) DEVICE — 3M™ STERI-DRAPE™ U-DRAPE 1015: Brand: STERI-DRAPE™

## (undated) DEVICE — SPONGE LAP 18 X 18 IN

## (undated) DEVICE — SUT MONOCRYL 4-0 PS-2 18 IN Y496G

## (undated) DEVICE — GLOVE SRG BIOGEL ORTHOPEDIC 7.5

## (undated) DEVICE — COBAN 4 IN STERILE

## (undated) DEVICE — PAD GROUNDING ADULT

## (undated) DEVICE — GLOVE SRG BIOGEL 7.5

## (undated) DEVICE — BLADE SHAVER DISSECTOR 3.5MM 13CM COOLCUT